# Patient Record
Sex: MALE | Race: WHITE | NOT HISPANIC OR LATINO | Employment: OTHER | URBAN - METROPOLITAN AREA
[De-identification: names, ages, dates, MRNs, and addresses within clinical notes are randomized per-mention and may not be internally consistent; named-entity substitution may affect disease eponyms.]

---

## 2022-10-10 ENCOUNTER — HOSPITAL ENCOUNTER (EMERGENCY)
Facility: HOSPITAL | Age: 42
Discharge: HOME | End: 2022-10-10
Attending: EMERGENCY MEDICINE | Admitting: EMERGENCY MEDICINE

## 2022-10-10 VITALS
WEIGHT: 148.15 LBS | HEART RATE: 89 BPM | HEIGHT: 72 IN | BODY MASS INDEX: 20.07 KG/M2 | SYSTOLIC BLOOD PRESSURE: 107 MMHG | RESPIRATION RATE: 16 BRPM | TEMPERATURE: 97.5 F | DIASTOLIC BLOOD PRESSURE: 80 MMHG | OXYGEN SATURATION: 100 %

## 2022-10-10 DIAGNOSIS — F41.9 ANXIETY: Primary | ICD-10-CM

## 2022-10-10 DIAGNOSIS — R45.89 SYMPTOMS OF DEPRESSION: ICD-10-CM

## 2022-10-10 DIAGNOSIS — F11.11 HX OF OPIOID ABUSE: ICD-10-CM

## 2022-10-10 LAB
ALBUMIN SERPL-MCNC: 4 G/DL (ref 3.5–5.2)
ALBUMIN/GLOB SERPL: 1.3 G/DL
ALP SERPL-CCNC: 65 U/L (ref 39–117)
ALT SERPL W P-5'-P-CCNC: 13 U/L (ref 1–41)
AMPHET+METHAMPHET UR QL: NEGATIVE
ANION GAP SERPL CALCULATED.3IONS-SCNC: 9 MMOL/L (ref 5–15)
AST SERPL-CCNC: 20 U/L (ref 1–40)
BARBITURATES UR QL SCN: NEGATIVE
BASOPHILS # BLD AUTO: 0.03 10*3/MM3 (ref 0–0.2)
BASOPHILS NFR BLD AUTO: 0.6 % (ref 0–1.5)
BENZODIAZ UR QL SCN: POSITIVE
BILIRUB SERPL-MCNC: <0.2 MG/DL (ref 0–1.2)
BUN SERPL-MCNC: 19 MG/DL (ref 6–20)
BUN/CREAT SERPL: 14.4 (ref 7–25)
CALCIUM SPEC-SCNC: 9.4 MG/DL (ref 8.6–10.5)
CANNABINOIDS SERPL QL: NEGATIVE
CHLORIDE SERPL-SCNC: 109 MMOL/L (ref 98–107)
CO2 SERPL-SCNC: 21 MMOL/L (ref 22–29)
COCAINE UR QL: NEGATIVE
CREAT SERPL-MCNC: 1.32 MG/DL (ref 0.76–1.27)
DEPRECATED RDW RBC AUTO: 47.9 FL (ref 37–54)
EGFRCR SERPLBLD CKD-EPI 2021: 69.5 ML/MIN/1.73
EOSINOPHIL # BLD AUTO: 0.11 10*3/MM3 (ref 0–0.4)
EOSINOPHIL NFR BLD AUTO: 2.3 % (ref 0.3–6.2)
ERYTHROCYTE [DISTWIDTH] IN BLOOD BY AUTOMATED COUNT: 15.5 % (ref 12.3–15.4)
ETHANOL BLD-MCNC: <10 MG/DL (ref 0–10)
ETHANOL UR QL: <0.01 %
GLOBULIN UR ELPH-MCNC: 3 GM/DL
GLUCOSE SERPL-MCNC: 78 MG/DL (ref 65–99)
HCT VFR BLD AUTO: 36.1 % (ref 37.5–51)
HGB BLD-MCNC: 11.7 G/DL (ref 13–17.7)
IMM GRANULOCYTES # BLD AUTO: 0 10*3/MM3 (ref 0–0.05)
IMM GRANULOCYTES NFR BLD AUTO: 0 % (ref 0–0.5)
LYMPHOCYTES # BLD AUTO: 2 10*3/MM3 (ref 0.7–3.1)
LYMPHOCYTES NFR BLD AUTO: 41.8 % (ref 19.6–45.3)
MCH RBC QN AUTO: 27.5 PG (ref 26.6–33)
MCHC RBC AUTO-ENTMCNC: 32.4 G/DL (ref 31.5–35.7)
MCV RBC AUTO: 84.7 FL (ref 79–97)
METHADONE UR QL SCN: NEGATIVE
MONOCYTES # BLD AUTO: 0.27 10*3/MM3 (ref 0.1–0.9)
MONOCYTES NFR BLD AUTO: 5.6 % (ref 5–12)
NEUTROPHILS NFR BLD AUTO: 2.37 10*3/MM3 (ref 1.7–7)
NEUTROPHILS NFR BLD AUTO: 49.7 % (ref 42.7–76)
NRBC BLD AUTO-RTO: 0 /100 WBC (ref 0–0.2)
OPIATES UR QL: NEGATIVE
OXYCODONE UR QL SCN: NEGATIVE
PLATELET # BLD AUTO: 397 10*3/MM3 (ref 140–450)
PMV BLD AUTO: 9.6 FL (ref 6–12)
POTASSIUM SERPL-SCNC: 5.4 MMOL/L (ref 3.5–5.2)
PROT SERPL-MCNC: 7 G/DL (ref 6–8.5)
RBC # BLD AUTO: 4.26 10*6/MM3 (ref 4.14–5.8)
SODIUM SERPL-SCNC: 139 MMOL/L (ref 136–145)
WBC NRBC COR # BLD: 4.78 10*3/MM3 (ref 3.4–10.8)

## 2022-10-10 PROCEDURE — 85025 COMPLETE CBC W/AUTO DIFF WBC: CPT | Performed by: NURSE PRACTITIONER

## 2022-10-10 PROCEDURE — 80307 DRUG TEST PRSMV CHEM ANLYZR: CPT | Performed by: NURSE PRACTITIONER

## 2022-10-10 PROCEDURE — 80053 COMPREHEN METABOLIC PANEL: CPT | Performed by: NURSE PRACTITIONER

## 2022-10-10 PROCEDURE — 36415 COLL VENOUS BLD VENIPUNCTURE: CPT

## 2022-10-10 PROCEDURE — 99283 EMERGENCY DEPT VISIT LOW MDM: CPT

## 2022-10-10 PROCEDURE — 90791 PSYCH DIAGNOSTIC EVALUATION: CPT | Performed by: SOCIAL WORKER

## 2022-10-10 PROCEDURE — 82077 ASSAY SPEC XCP UR&BREATH IA: CPT | Performed by: NURSE PRACTITIONER

## 2022-10-10 RX ORDER — MYCOPHENOLATE MOFETIL 500 MG/1
TABLET ORAL 2 TIMES DAILY
COMMUNITY

## 2022-10-10 NOTE — ED NOTES
Pt attempted to provide urine sample but was unsuccessful. This RN in appropriate ppe while in pt room. Pt wearing mask.

## 2022-10-10 NOTE — DISCHARGE INSTRUCTIONS
Although you are being discharged from the ED today, I encourage you to return for worsening symptoms. Things can, and do, change such that treatment at home with medication may not be adequate. Specifically I recommend returning for chest pain or discomfort, difficulty breathing, persistent vomiting or difficulty holding down liquids or medications, concerns for harming oneself or anyone else, or any other worsening or alarming symptoms.     Rest.   Utilize resources as given by our Behavioral Health team for assistance with mental health treatment and opioid abuse (medication assisted treatment).  Follow up with primary care provider for further management and to have blood pressure rechecked.  Take your medications as prescribed.  Call 911 or go to ER if concern for plan to harm yourself.

## 2022-10-10 NOTE — ED PROVIDER NOTES
The B[facility] and I have discussed this patient's history, physical exam and treatment plan.  I provided a substantive portion of the care of this patient.  I have reviewed the documentation and personally had a face to face interaction with the patient and personally performed the physical exam, in its entirety.  I affirm the documentation and agree with the treatment and plan.  The following describes my personal findings.      The patient presents complaining of feeling helpless and hopeless, negative thoughts that are pervasive,, poor sleep, patient denies hallucination, reports he is having thoughts that he does not want to be alive but denies a plan for self-harm.    Comprehensive Physical exam:  Patient is nontoxic appearing oriented, conversant awake, alert  HEENT: normocephalic, atraumatic  Neck: No JVD no goiter, no pain with ROM  Pulmonary: Nontachypneic, breath sounds heard well bilaterally  cardiovascular: Nontachycardic  Abdomen: Nondistended  musculoskeletal: Good range of motion, pulse, sensation x4  Neuro/psychiatric:calm, appropriate, cooperative, reports negative thoughts, denies plan for self-harm or harm to others  Skin:warm, dry    Access to evaluate and give recommendations.  Patient reports she does not have a therapist or psychiatrist that he is actively seeing.  Reports he seen multiple providers and taken meds without improvement of his symptoms    Patient was wearing facemask when I entered the room and throughout our encounter. Full protective equipment was worn throughout this patient encounter including a face mask, eye protection and gloves. Hand hygiene was performed before donning protective equipment and after removal when leaving the room.           Selma Arreaga MD  10/10/22 2357

## 2022-10-10 NOTE — ED TRIAGE NOTES
"Pt has had suicidal ideation - \"24/7\" - no other details - he wants to speak privately    Patient was placed in face mask during first look triage.  Patient was wearing a face mask throughout encounter.  I wore personal protective equipment throughout the encounter.  Hand hygiene was performed before and after patient encounter.     "

## 2022-10-10 NOTE — CONSULTS
"Access center evaluated 41-year-old male for suicidal ideation.  Patient told ED staff that he was having suicidal thoughts but did not have a plan.  Patient stated \"I do not feel I will try to kill myself\".  Patient told Access that he is withdrawing from heroin and was hoping to be able to get some benzos to be able to help.  Patient is going through treatment at the [facility] and they told him to come to [facility] to get medicine. They will not be able to start him on his Suboxone until Friday.  Patient states he has Suboxone at home that he was trying to use because it has worked for him in the past but does not feel it is helping.  Patient states that he has tried all kinds of medicines including antidepressants and antianxiety such as Atarax and Vistaril but \"nothing works\".  Patient states the only thing that worked in the past was Valium and Xanax.  Patient states he is over the worst part of his withdrawal but continues very uncomfortable.  Patient states he used Xanax off the street to help with the worst part of this more current withdrawal.  Patient states he had been clean for a period of time with Suboxone but then fell off the wagon a couple of weeks ago.  Patient had told ED staff that he had taken a Tylenol overdose in the past.  Access asked patient if that was a suicide attempt and he stated it was a \"manipulation to stay in the house\".    Patient states he has been to numerous substance abuse treatment programs and detox and leaves treatment AMA.  Patient states he just tried to go to [facility]'s 5-day detox program and he left after a day.  Patient has something negative to say about any program offered including any of the support groups.  Patient came to the ED he states to hope to get on a benzo.  Patient rates his current depression as a \"8 or 9/10\" and his anxiety as a \"10/10\".  Patient states he has had anxiety his whole life.  Patient lives with his father and states his " "mother  1 year ago.  Patient states he knows he is a burden to his father and that his father is trying to get him to move out.  Access talked to patient about the need for him to stick with treatment and not leave prematurely AMA.  Patient denies any inpatient psychiatric treatment and states he had some counseling in the past.  Patient states he last used heroin 10 days ago.  Patient states his withdrawal symptoms include poor sleep, poor concentration, increased anxiety, some diarrhea and cold chills.  Patient states he realizes he needs to push hard to get through the last part of his withdrawal.  Patient turned down the possibility of being sent home with some Atarax.  Patient stated he will likely try to find some street Xanax again.    Patient lives in a house with his father but states his father will be selling the house sometime soon.  At that point patient states he will need to find a place to live.  Patient states he is on disability but has not received a check in 11 months because they overpaid him in back money and he \"blew through it\".  Patient states he will receive a disability check at the end of November.  Access gave patient information on several programs that do medication assisted detox that he has not tried in the past.  Patient states he will try to call them but was not hopeful.  Access also gave him information on [facility] as he does not like the [facility] aspect of AA.  Access talked with ED PA who was in agreement with discharge plan.  Patient okay with discharge plan.  "

## 2022-10-10 NOTE — ED PROVIDER NOTES
EMERGENCY DEPARTMENT ENCOUNTER    Room Number:  04/04  Date seen:  10/10/2022  Time seen: 12:17 EDT  PCP: Provider, No Known  Historian: patient      HPI:  Chief Complaint: suicidal ideation    Context: Ryan Marina is a 41 y.o. male who presents to the ED for evaluation of suicidal ideation.  Patient states he has felt this way for several weeks.  Patient states he has had recurring thoughts of suicidal ideations but no current plan.  Patient states he has felt very depressed.  He believes this is due to the loss of his mother that occurred approximately 1 year ago.  He has lived in his parents home and his father will be moving soon so he will have nowhere to live, this is also contributing to his depression.  He states he feels on accomplished for his age.  Patient states he has never been diagnosed with depression, but has dealt with anxiety his entire life.  He admits to suicidal ideation in the past and suicide attempts with Tylenol overdose and illicit drug overdose.  Patient admits to history of opioid addiction.  He states he last used heroin approximately 10 days ago.  He denies any alcohol use.  Patient states he has never been treated at an inpatient psychiatric facility but has been in rehab before in the past.  He denies any homicidal ideation.  He does admit to hearing and seeing voices at nighttime before he sleeps.  He states he has dealt with severe sadness and lack of motivation.  He admits to a history of kidney disease, glomerulonephritis and sees a nephrologist for this- Dr. Cam.  He denies any current or past history of dialysis.      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   • KNEE ACL RECONSTRUCTION      1996 and 1999--anterior cruciate ligament reconstruction right knee.         FAMILY HISTORY  Family History   Problem Relation Age of Onset   • Diabetes Mother         Type 2   • Hyperthyroidism Father    • Diabetes Maternal Grandmother         Type 2         SOCIAL HISTORY  Social History     Socioeconomic History   • Marital status: Single   Tobacco Use   • Smoking status: Every Day     Packs/day: 1.50     Types: Cigarettes   Substance and Sexual Activity   • Alcohol use: No         ALLERGIES  Patient has no known allergies.        REVIEW OF SYSTEMS  Review of Systems   Constitutional: Negative for chills and fever.   Respiratory: Negative for cough and shortness of breath.    Cardiovascular: Negative for chest pain.   Gastrointestinal: Negative for abdominal pain, nausea and vomiting.   Genitourinary: Negative for dysuria and flank pain.   Musculoskeletal: Negative for back pain.   Skin: Negative for color change.   Neurological: Negative for dizziness, seizures, syncope and headaches.   Psychiatric/Behavioral: Positive for hallucinations and suicidal ideas. Negative for self-injury. The patient is nervous/anxious.       All systems reviewed and negative except for those discussed in HPI.       PHYSICAL EXAM  ED Triage Vitals   Temp Heart Rate Resp BP SpO2   10/10/22 1108 10/10/22 1108 10/10/22 1108 10/10/22 1124 10/10/22 1108   97.5 °F (36.4 °C) 89 16  107/80 100 %      Temp src Heart Rate Source Patient Position BP Location FiO2 (%)   10/10/22 1108 10/10/22 1108 -- -- --   Tympanic Monitor            GENERAL: not distressed  HENT: atraumatic  EYES: no scleral icterus  CV: regular rhythm, regular rate.  No murmurs, rubs, or gallops  RESPIRATORY: normal effort.  Clear to auscultation bilaterally  ABDOMEN: soft, nontender  MUSCULOSKELETAL: no deformity  NEURO: alert, moves all extremities, follows commands  PSYCH: Suicidal thoughts but no current plan.  No homicidal ideations.  Affect is somewhat flat, mood seems depressed.  SKIN: warm, dry.  Old track marks present on bilateral forearms    Vital signs and nursing notes reviewed.          LAB RESULTS  Recent Results (from the past 24 hour(s))   Comprehensive Metabolic Panel    Collection Time: 10/10/22 11:39 AM    Specimen: Blood   Result Value Ref Range    Glucose 78 65 - 99 mg/dL    BUN 19 6 - 20 mg/dL    Creatinine 1.32 (H) 0.76 - 1.27 mg/dL    Sodium 139 136 - 145 mmol/L    Potassium 5.4 (H) 3.5 - 5.2 mmol/L    Chloride 109 (H) 98 - 107 mmol/L    CO2 21.0 (L) 22.0 - 29.0 mmol/L    Calcium 9.4 8.6 - 10.5 mg/dL    Total Protein 7.0 6.0 - 8.5 g/dL    Albumin 4.00 3.50 - 5.20 g/dL    ALT (SGPT) 13 1 - 41 U/L    AST (SGOT) 20 1 - 40 U/L    Alkaline Phosphatase 65 39 - 117 U/L    Total Bilirubin <0.2 0.0 - 1.2 mg/dL    Globulin 3.0 gm/dL    A/G Ratio 1.3 g/dL    BUN/Creatinine Ratio 14.4 7.0 - 25.0    Anion Gap 9.0 5.0 - 15.0 mmol/L    eGFR 69.5 >60.0 mL/min/1.73   Ethanol    Collection Time: 10/10/22 11:39 AM    Specimen: Blood   Result Value Ref Range    Ethanol <10 0 - 10 mg/dL    Ethanol % <0.010 %   CBC Auto Differential    Collection Time: 10/10/22 11:39 AM    Specimen: Blood   Result Value Ref Range    WBC 4.78 3.40 - 10.80 10*3/mm3    RBC 4.26 4.14 - 5.80 10*6/mm3    Hemoglobin 11.7 (L) 13.0 - 17.7 g/dL    Hematocrit 36.1 (L) 37.5 - 51.0 %    MCV 84.7 79.0 - 97.0 fL    MCH 27.5 26.6 - 33.0 pg    MCHC 32.4 31.5  - 35.7 g/dL    RDW 15.5 (H) 12.3 - 15.4 %    RDW-SD 47.9 37.0 - 54.0 fl    MPV 9.6 6.0 - 12.0 fL    Platelets 397 140 - 450 10*3/mm3    Neutrophil % 49.7 42.7 - 76.0 %    Lymphocyte % 41.8 19.6 - 45.3 %    Monocyte % 5.6 5.0 - 12.0 %    Eosinophil % 2.3 0.3 - 6.2 %    Basophil % 0.6 0.0 - 1.5 %    Immature Grans % 0.0 0.0 - 0.5 %    Neutrophils, Absolute 2.37 1.70 - 7.00 10*3/mm3    Lymphocytes, Absolute 2.00 0.70 - 3.10 10*3/mm3    Monocytes, Absolute 0.27 0.10 - 0.90 10*3/mm3    Eosinophils, Absolute 0.11 0.00 - 0.40 10*3/mm3    Basophils, Absolute 0.03 0.00 - 0.20 10*3/mm3    Immature Grans, Absolute 0.00 0.00 - 0.05 10*3/mm3    nRBC 0.0 0.0 - 0.2 /100 WBC   Urine Drug Screen - Urine, Clean Catch    Collection Time: 10/10/22 12:36 PM    Specimen: Urine, Clean Catch   Result Value Ref Range    Amphet/Methamphet, Screen Negative Negative    Barbiturates Screen, Urine Negative Negative    Benzodiazepine Screen, Urine Positive (A) Negative    Cocaine Screen, Urine Negative Negative    Opiate Screen Negative Negative    THC, Screen, Urine Negative Negative    Methadone Screen, Urine Negative Negative    Oxycodone Screen, Urine Negative Negative       Ordered the above labs and independently reviewed the results.        MEDICATIONS GIVEN IN ER  Medications - No data to display    MEDICAL RECORD REVIEW  I reviewed the patient's records      PROGRESS, DATA ANALYSIS, CONSULTS, AND MEDICAL DECISION MAKING    All labs have been independently reviewed by me. Discussion below represents my analysis of pertinent findings related to patient's condition, differential diagnosis, treatment plan and final disposition.    DDX includes but not limited to depression, anxiety, psychosis, acute alcohol intoxication, acute drug intoxication.      ED Course as of 10/10/22 1524   Mon Oct 10, 2022   1122 First look.  Pt having non stop thoughts of suicide.  These thoughts are intrusive and obsessive. He has multiple medical problems. I have  discussed plan for labs, ACCESS consult.   [EW]   1316 Patient's labs have returned.  He did test positive for benzodiazepines.  His creatinine is 1.32 which is good considering he told me his creatinine has been in the threes before with his history of kidney issues.  He is awaiting access eval at this time. [AH]   1357 Lee Givens RN, is now here to evaluate. [AH]   1431 Discussed the patient with lee Givens RN.  She states she had a long discussion with the patient and he admitted to her he does not have any active plan to harm himself.  She believes he is seeking Ativan and admitted that he has manipulated with prior suicide attempt in the past to try to get benzos.  She offered him multiple resources, to which she denied and Stating he just wanted Ativan.  She will be writing him resources for medication assisted sobriety but believes he will be good for discharge.  I will discontinue to hold at this time. [AH]   1508 I discussed lab results with the patient and my discussion with lee Givens RN.  He states he has tried all of the things that she offered in the past and believes he just needs help with withdrawal at this time.  His vital signs and lab work and physical presentation do not show signs of acute withdrawal at this time.  Chon believes he is seeking Ativan as she states he directly told her that that is all he really came here for.  He has been given resources for medication assisted opiate abuse treatment and will be discharged from our ER. [AH]      ED Course User Index  [AH] Farida Hernadez PA  [EW] Lillian Soliman APRN           Reviewed pt's history and workup with Dr. Arreaga.  After a bedside evaluation; they agree with the plan of care      Patient's disposition is discharge.    Patient was placed in face mask in first look. Patient was wearing facemask each time I entered the room and throughout our encounter. I wore full protective equipment throughout this patient encounter including a  face mask, eye shield, gown and gloves. Hand hygiene was performed before donning protective equipment and after removal when leaving the room.        DIAGNOSIS  Final diagnoses:   Anxiety   Symptoms of depression   Hx of opioid abuse (HCC)           Latest Documented Vital Signs:  As of 15:24 EDT  BP- 107/80 HR- 89 Temp- 97.5 °F (36.4 °C) (Tympanic) O2 sat- 100%         Farida Hernadez PA  10/10/22 4045

## 2023-10-19 ENCOUNTER — HOSPITAL ENCOUNTER (EMERGENCY)
Facility: HOSPITAL | Age: 43
Discharge: HOME | End: 2023-10-19
Attending: EMERGENCY MEDICINE
Payer: MEDICARE

## 2023-10-19 ENCOUNTER — APPOINTMENT (OUTPATIENT)
Dept: CARDIOLOGY | Facility: HOSPITAL | Age: 43
End: 2023-10-19
Payer: MEDICARE

## 2023-10-19 ENCOUNTER — APPOINTMENT (OUTPATIENT)
Dept: GENERAL RADIOLOGY | Facility: HOSPITAL | Age: 43
End: 2023-10-19
Payer: MEDICARE

## 2023-10-19 VITALS
HEART RATE: 80 BPM | BODY MASS INDEX: 1.76 KG/M2 | RESPIRATION RATE: 18 BRPM | DIASTOLIC BLOOD PRESSURE: 72 MMHG | SYSTOLIC BLOOD PRESSURE: 102 MMHG | TEMPERATURE: 97.7 F | WEIGHT: 13 LBS | HEIGHT: 72 IN | OXYGEN SATURATION: 97 %

## 2023-10-19 DIAGNOSIS — I82.4Z1 ACUTE DEEP VEIN THROMBOSIS (DVT) OF DISTAL VEIN OF RIGHT LOWER EXTREMITY: Primary | ICD-10-CM

## 2023-10-19 LAB
ALBUMIN SERPL-MCNC: 3.7 G/DL (ref 3.5–5.2)
ALBUMIN/GLOB SERPL: 1.2 G/DL
ALP SERPL-CCNC: 118 U/L (ref 39–117)
ALT SERPL W P-5'-P-CCNC: 17 U/L (ref 1–41)
ANION GAP SERPL CALCULATED.3IONS-SCNC: 12.2 MMOL/L (ref 5–15)
APTT PPP: 31.4 SECONDS (ref 22.7–35.4)
AST SERPL-CCNC: 39 U/L (ref 1–40)
BASOPHILS # BLD AUTO: 0.01 10*3/MM3 (ref 0–0.2)
BASOPHILS NFR BLD AUTO: 0.2 % (ref 0–1.5)
BH CV LOW VAS RIGHT GASTRONEMIUS VESSEL: 1
BH CV LOWER VASCULAR LEFT COMMON FEMORAL AUGMENT: NORMAL
BH CV LOWER VASCULAR LEFT COMMON FEMORAL COMPETENT: NORMAL
BH CV LOWER VASCULAR LEFT COMMON FEMORAL COMPRESS: NORMAL
BH CV LOWER VASCULAR LEFT COMMON FEMORAL PHASIC: NORMAL
BH CV LOWER VASCULAR LEFT COMMON FEMORAL SPONT: NORMAL
BH CV LOWER VASCULAR RIGHT COMMON FEMORAL AUGMENT: NORMAL
BH CV LOWER VASCULAR RIGHT COMMON FEMORAL COMPETENT: NORMAL
BH CV LOWER VASCULAR RIGHT COMMON FEMORAL COMPRESS: NORMAL
BH CV LOWER VASCULAR RIGHT COMMON FEMORAL PHASIC: NORMAL
BH CV LOWER VASCULAR RIGHT COMMON FEMORAL SPONT: NORMAL
BH CV LOWER VASCULAR RIGHT DISTAL FEMORAL COMPRESS: NORMAL
BH CV LOWER VASCULAR RIGHT GASTRONEMIUS COMPRESS: NORMAL
BH CV LOWER VASCULAR RIGHT GASTRONEMIUS THROMBUS: NORMAL
BH CV LOWER VASCULAR RIGHT GREATER SAPH AK COMPRESS: NORMAL
BH CV LOWER VASCULAR RIGHT GREATER SAPH BK COMPRESS: NORMAL
BH CV LOWER VASCULAR RIGHT LESSER SAPH COMPRESS: NORMAL
BH CV LOWER VASCULAR RIGHT MID FEMORAL AUGMENT: NORMAL
BH CV LOWER VASCULAR RIGHT MID FEMORAL COMPETENT: NORMAL
BH CV LOWER VASCULAR RIGHT MID FEMORAL COMPRESS: NORMAL
BH CV LOWER VASCULAR RIGHT MID FEMORAL PHASIC: NORMAL
BH CV LOWER VASCULAR RIGHT MID FEMORAL SPONT: NORMAL
BH CV LOWER VASCULAR RIGHT PERONEAL COMPRESS: NORMAL
BH CV LOWER VASCULAR RIGHT POPLITEAL AUGMENT: NORMAL
BH CV LOWER VASCULAR RIGHT POPLITEAL COMPETENT: NORMAL
BH CV LOWER VASCULAR RIGHT POPLITEAL COMPRESS: NORMAL
BH CV LOWER VASCULAR RIGHT POPLITEAL PHASIC: NORMAL
BH CV LOWER VASCULAR RIGHT POPLITEAL SPONT: NORMAL
BH CV LOWER VASCULAR RIGHT POSTERIOR TIBIAL COMPRESS: NORMAL
BH CV LOWER VASCULAR RIGHT PROFUNDA FEMORAL COMPRESS: NORMAL
BH CV LOWER VASCULAR RIGHT PROXIMAL FEMORAL COMPRESS: NORMAL
BH CV LOWER VASCULAR RIGHT SAPHENOFEMORAL JUNCTION COMPRESS: NORMAL
BH CV VAS PRELIMINARY FINDINGS SCRIPTING: 1
BILIRUB SERPL-MCNC: <0.2 MG/DL (ref 0–1.2)
BUN SERPL-MCNC: 19 MG/DL (ref 6–20)
BUN/CREAT SERPL: 14.5 (ref 7–25)
CALCIUM SPEC-SCNC: 9.1 MG/DL (ref 8.6–10.5)
CHLORIDE SERPL-SCNC: 106 MMOL/L (ref 98–107)
CO2 SERPL-SCNC: 21.8 MMOL/L (ref 22–29)
CREAT SERPL-MCNC: 1.31 MG/DL (ref 0.76–1.27)
DEPRECATED RDW RBC AUTO: 42.1 FL (ref 37–54)
EGFRCR SERPLBLD CKD-EPI 2021: 69.7 ML/MIN/1.73
EOSINOPHIL # BLD AUTO: 0.1 10*3/MM3 (ref 0–0.4)
EOSINOPHIL NFR BLD AUTO: 2.2 % (ref 0.3–6.2)
ERYTHROCYTE [DISTWIDTH] IN BLOOD BY AUTOMATED COUNT: 13.4 % (ref 12.3–15.4)
GLOBULIN UR ELPH-MCNC: 3 GM/DL
GLUCOSE SERPL-MCNC: 76 MG/DL (ref 65–99)
HCT VFR BLD AUTO: 28.5 % (ref 37.5–51)
HGB BLD-MCNC: 9.5 G/DL (ref 13–17.7)
IMM GRANULOCYTES # BLD AUTO: 0.01 10*3/MM3 (ref 0–0.05)
IMM GRANULOCYTES NFR BLD AUTO: 0.2 % (ref 0–0.5)
INR PPP: 0.95 (ref 0.9–1.1)
LYMPHOCYTES # BLD AUTO: 1.74 10*3/MM3 (ref 0.7–3.1)
LYMPHOCYTES NFR BLD AUTO: 38.3 % (ref 19.6–45.3)
MCH RBC QN AUTO: 28.8 PG (ref 26.6–33)
MCHC RBC AUTO-ENTMCNC: 33.3 G/DL (ref 31.5–35.7)
MCV RBC AUTO: 86.4 FL (ref 79–97)
MONOCYTES # BLD AUTO: 0.32 10*3/MM3 (ref 0.1–0.9)
MONOCYTES NFR BLD AUTO: 7 % (ref 5–12)
NEUTROPHILS NFR BLD AUTO: 2.36 10*3/MM3 (ref 1.7–7)
NEUTROPHILS NFR BLD AUTO: 52.1 % (ref 42.7–76)
NRBC BLD AUTO-RTO: 0 /100 WBC (ref 0–0.2)
PLATELET # BLD AUTO: 225 10*3/MM3 (ref 140–450)
PMV BLD AUTO: 9.5 FL (ref 6–12)
POTASSIUM SERPL-SCNC: 3.9 MMOL/L (ref 3.5–5.2)
PROT SERPL-MCNC: 6.7 G/DL (ref 6–8.5)
PROTHROMBIN TIME: 12.7 SECONDS (ref 11.7–14.2)
RBC # BLD AUTO: 3.3 10*6/MM3 (ref 4.14–5.8)
SODIUM SERPL-SCNC: 140 MMOL/L (ref 136–145)
WBC NRBC COR # BLD: 4.54 10*3/MM3 (ref 3.4–10.8)

## 2023-10-19 PROCEDURE — 73610 X-RAY EXAM OF ANKLE: CPT

## 2023-10-19 PROCEDURE — 63710000001 ONDANSETRON ODT 4 MG TABLET DISPERSIBLE: Performed by: EMERGENCY MEDICINE

## 2023-10-19 PROCEDURE — 99284 EMERGENCY DEPT VISIT MOD MDM: CPT

## 2023-10-19 PROCEDURE — 93971 EXTREMITY STUDY: CPT

## 2023-10-19 PROCEDURE — 85610 PROTHROMBIN TIME: CPT | Performed by: EMERGENCY MEDICINE

## 2023-10-19 PROCEDURE — 85730 THROMBOPLASTIN TIME PARTIAL: CPT | Performed by: EMERGENCY MEDICINE

## 2023-10-19 PROCEDURE — 80053 COMPREHEN METABOLIC PANEL: CPT | Performed by: EMERGENCY MEDICINE

## 2023-10-19 PROCEDURE — 85025 COMPLETE CBC W/AUTO DIFF WBC: CPT | Performed by: EMERGENCY MEDICINE

## 2023-10-19 RX ORDER — IBUPROFEN 800 MG/1
800 TABLET ORAL ONCE
Status: DISCONTINUED | OUTPATIENT
Start: 2023-10-19 | End: 2023-10-19 | Stop reason: HOSPADM

## 2023-10-19 RX ORDER — BUMETANIDE 2 MG/1
2 TABLET ORAL
COMMUNITY
Start: 2023-05-08

## 2023-10-19 RX ORDER — OXYCODONE HYDROCHLORIDE AND ACETAMINOPHEN 5; 325 MG/1; MG/1
1 TABLET ORAL ONCE AS NEEDED
Status: DISCONTINUED | OUTPATIENT
Start: 2023-10-19 | End: 2023-10-19 | Stop reason: HOSPADM

## 2023-10-19 RX ORDER — MEDICAL SUPPLY, MISCELLANEOUS
EACH MISCELLANEOUS
COMMUNITY
Start: 2023-09-26

## 2023-10-19 RX ORDER — ONDANSETRON 4 MG/1
8 TABLET, ORALLY DISINTEGRATING ORAL ONCE
Status: COMPLETED | OUTPATIENT
Start: 2023-10-19 | End: 2023-10-19

## 2023-10-19 RX ORDER — FOLIC ACID 1 MG/1
1 TABLET ORAL DAILY
COMMUNITY
Start: 2023-08-10 | End: 2023-11-08

## 2023-10-19 RX ORDER — METHADONE HYDROCHLORIDE 5 MG/1
115 TABLET ORAL
COMMUNITY

## 2023-10-19 RX ORDER — OXYCODONE HYDROCHLORIDE AND ACETAMINOPHEN 5; 325 MG/1; MG/1
1 TABLET ORAL ONCE
Status: COMPLETED | OUTPATIENT
Start: 2023-10-19 | End: 2023-10-19

## 2023-10-19 RX ORDER — POLYETHYLENE GLYCOL 3350 17 G/17G
POWDER ORAL
COMMUNITY
Start: 2023-09-26

## 2023-10-19 RX ADMIN — OXYCODONE HYDROCHLORIDE AND ACETAMINOPHEN 1 TABLET: 5; 325 TABLET ORAL at 17:36

## 2023-10-19 RX ADMIN — ONDANSETRON 8 MG: 4 TABLET, ORALLY DISINTEGRATING ORAL at 19:20

## 2023-10-19 NOTE — ED PROVIDER NOTES
EMERGENCY DEPARTMENT ENCOUNTER  Room Number:  39/39  Date of encounter:  10/19/2023  PCP: Provider, No Known  Patient Care Team:  Provider, No Known as PCP - General     HPI:  Context: Ryan Marina is a 42 y.o. male who presents to the ED c/o chief complaint of right ankle pain and loosened screw.  Patient reports that he had surgery for right [facility]  on the 23rd of last month.  Patient reports that he was seen at his orthopedist office 2 days ago secondary to increased pain and swelling.  Patient reports that he had x-ray imaging that reportedly showed lucent screw.  Patient reports that he was unable to speak with his orthopedist at that time, has been unable to follow-up with him.  Patient reports that he has had increased pain and swelling for the last 3 days.  No new injury.  Patient reports chronic wound from time of surgery, wound is slowly improving, patient denies any recent change other than sutures were removed recently.  No increased redness warmth or drainage, no fever shakes chills or night sweats.  Patient denies any chest pain or shortness of breath.    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    FRACTURE SURGERY      KNEE ACL RECONSTRUCTION      1996 and 1999--anterior cruciate ligament reconstruction right knee.       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    The patient's allergies have been reviewed    REVIEW OF SYSTEMS  All systems reviewed and negative except for those discussed in HPI.     PHYSICAL EXAM  I have reviewed the triage vital signs and nursing notes.  ED Triage Vitals   Temp Heart Rate Resp BP SpO2   10/19/23 1655 10/19/23 1657 10/19/23 1655 -- 10/19/23 1657   97.7 °F (36.5 °C) 111 18  100 %      Temp src Heart Rate Source Patient Position BP Location FiO2 (%)   -- 10/19/23 1657 -- -- --    Monitor          General: No acute distress.  HENT: NCAT, PERRL, Nares patent.  Eyes: no scleral icterus.  Neck: trachea midline, no ROM limitations.  CV: regular rhythm, regular rate.  Respiratory: normal effort, CTAB.  Abdomen: soft, nondistended, NTTP, no rebound tenderness, no guarding or rigidity.  Musculoskeletal:   Right lower extremity: Patient has tenderness and swelling at the distal right lower leg, nonpitting edema, scabbed lesion on right lateral ankle, trace surrounding pinkish discoloration, no warmth, no induration, no fluctuance or drainage.  Skin is warm dry, 2+ dorsalis pedis and posterior tibial pulse, sensation intact to  light touch.  Toes/ankle up/down.  No palpable cords, negative Homans.  Neuro: alert, moves all extremities, follows commands.  Skin: warm, dry.    LAB RESULTS  Recent Results (from the past 24 hour(s))   Comprehensive Metabolic Panel    Collection Time: 10/19/23  5:22 PM    Specimen: Blood   Result Value Ref Range    Glucose 76 65 - 99 mg/dL    BUN 19 6 - 20 mg/dL    Creatinine 1.31 (H) 0.76 - 1.27 mg/dL    Sodium 140 136 - 145 mmol/L    Potassium 3.9 3.5 - 5.2 mmol/L    Chloride 106 98 - 107 mmol/L    CO2 21.8 (L) 22.0 - 29.0 mmol/L    Calcium 9.1 8.6 - 10.5 mg/dL    Total Protein 6.7 6.0 - 8.5 g/dL    Albumin 3.7 3.5 - 5.2 g/dL    ALT (SGPT) 17 1 - 41 U/L    AST (SGOT) 39 1 - 40 U/L    Alkaline Phosphatase 118 (H) 39 - 117 U/L    Total Bilirubin <0.2 0.0 - 1.2 mg/dL    Globulin 3.0 gm/dL    A/G Ratio 1.2 g/dL    BUN/Creatinine Ratio 14.5 7.0 - 25.0    Anion Gap 12.2 5.0 - 15.0 mmol/L    eGFR 69.7 >60.0 mL/min/1.73   Protime-INR    Collection Time: 10/19/23  5:22 PM    Specimen: Blood   Result Value Ref Range    Protime 12.7 11.7 - 14.2 Seconds    INR 0.95 0.90 - 1.10   aPTT    Collection Time: 10/19/23  5:22 PM    Specimen: Blood   Result Value Ref Range    PTT 31.4 22.7 - 35.4 seconds   CBC Auto Differential    Collection Time: 10/19/23  5:22 PM    Specimen: Blood   Result Value Ref Range    WBC 4.54 3.40 - 10.80 10*3/mm3    RBC 3.30 (L) 4.14 - 5.80 10*6/mm3    Hemoglobin 9.5 (L) 13.0 - 17.7 g/dL    Hematocrit 28.5 (L) 37.5 - 51.0 %    MCV 86.4 79.0 - 97.0 fL    MCH 28.8 26.6 - 33.0 pg    MCHC 33.3 31.5 - 35.7 g/dL    RDW 13.4 12.3 - 15.4 %    RDW-SD 42.1 37.0 - 54.0 fl    MPV 9.5 6.0 - 12.0 fL    Platelets 225 140 - 450 10*3/mm3    Neutrophil % 52.1 42.7 - 76.0 %    Lymphocyte % 38.3 19.6 - 45.3 %    Monocyte % 7.0 5.0 - 12.0 %    Eosinophil % 2.2 0.3 - 6.2 %    Basophil % 0.2 0.0 - 1.5 %    Immature Grans % 0.2 0.0 - 0.5 %    Neutrophils, Absolute 2.36 1.70 - 7.00 10*3/mm3    Lymphocytes, Absolute 1.74 0.70 -  3.10 10*3/mm3    Monocytes, Absolute 0.32 0.10 - 0.90 10*3/mm3    Eosinophils, Absolute 0.10 0.00 - 0.40 10*3/mm3    Basophils, Absolute 0.01 0.00 - 0.20 10*3/mm3    Immature Grans, Absolute 0.01 0.00 - 0.05 10*3/mm3    nRBC 0.0 0.0 - 0.2 /100 WBC   Duplex Venous Lower Extremity - Right CAR    Collection Time: 10/19/23  6:27 PM   Result Value Ref Range    Right Gastronemius Vessel 1.0     Right Common Femoral Spont Y     Right Common Femoral Competent Y     Right Common Femoral Phasic Y     Right Common Femoral Compress C     Right Common Femoral Augment Y     Right Saphenofemoral Junction Compress C     Right Profunda Femoral Compress C     Right Proximal Femoral Compress C     Right Mid Femoral Spont Y     Right Mid Femoral Competent Y     Right Mid Femoral Phasic Y     Right Mid Femoral Compress C     Right Mid Femoral Augment Y     Right Distal Femoral Compress C     Right Popliteal Spont Y     Right Popliteal Competent Y     Right Popliteal Phasic Y     Right Popliteal Compress C     Right Popliteal Augment Y     Right Posterior Tibial Compress C     Right Peroneal Compress C     Right Gastronemius Compress P     Right Gastronemius Thrombus A     Right Greater Saph AK Compress C     Right Greater Saph BK Compress C     Right Lesser Saph Compress C     Left Common Femoral Spont Y     Left Common Femoral Competent Y     Left Common Femoral Phasic Y     Left Common Femoral Compress C     Left Common Femoral Augment Y     BH CV VAS PRELIMINARY FINDINGS SCRIPTING 1.0        I ordered the above labs and reviewed the results.    RADIOLOGY  Duplex Venous Lower Extremity - Right CAR    Result Date: 10/19/2023    Acute right lower extremity deep vein thrombosis noted in the gastrocnemius.   All other right sided veins appeared normal.     XR Ankle 3+ View Right    Result Date: 10/19/2023  XR ANKLE 3+ VW RIGHT-  INDICATIONS: Surgery, pain, loosened hardware   TECHNIQUE: 3 VIEWS OF THE RIGHT ANKLE  COMPARISON: None  available  FINDINGS:  Surgical fixative hardware is seen at the distal tibia and fibula. Fracture lucencies are still visible. Follow-up/further evaluation can be obtained as indicated.       As described.    This report was finalized on 10/19/2023 6:00 PM by Dr. Isaak De Jesus M.D on Workstation: [id number]       I ordered the above noted radiological studies. I reviewed the images and results. I agree with the radiologist interpretation.    PROCEDURES  Procedures    MEDICATIONS GIVEN IN ER  Medications   oxyCODONE-acetaminophen (PERCOCET) 5-325 MG per tablet 1 tablet (has no administration in time range)   ibuprofen (ADVIL,MOTRIN) tablet 800 mg (0 mg Oral Hold 10/19/23 1737)   ondansetron ODT (ZOFRAN-ODT) disintegrating tablet 8 mg (has no administration in time range)   oxyCODONE-acetaminophen (PERCOCET) 5-325 MG per tablet 1 tablet (1 tablet Oral Given 10/19/23 1736)       PROGRESS, DATA ANALYSIS, CONSULTS, AND MEDICAL DECISION MAKING  A complete history and physical exam have been performed.  All available laboratory and imaging results have been reviewed by myself prior to disposition.    MDM    After the initial H&P, I discussed pertinent information from history and physical exam with patient/family.  Discussed differential diagnosis.  Discussed plan for ED evaluation/workup/treatment.  All questions answered.  Patient/family is agreeable with plan.  ED Course as of 10/19/23 1910   Thu Oct 19, 2023   1718 Obtaining x-ray imaging, duplex ultrasound to rule out blood clot, obtaining screening lab work, treating pain [JG]   1810 I reviewed right ankle x-rays in PACS, distal tibia and fibula fractures with hardware in place, no obvious misalignment. [JG]   1830 Preliminary report per ultrasound is patient is positive for acute DVT and right lower extremity, DVT limited to gastroc pain. [JG]   1908 Patient reassessed, discussed ED work-up and results, discussed finding of acute DVT, plan to start on  anticoagulation.  Patient reports that he believes that he is currently on anticoagulation, denies any history of blood clot in the past, reports he was put on it as a prophylactic after surgery.  I discussed with pharmacy, we reviewed records, do not see where patient was placed on a blood thinner including where reviewing medication list from recent [facility] discharge.  Plan to start patient on Eliquis, pharmacy given bleeding precautions. [JG]      ED Course User Index  [JG] Joe Patricia MD       AS OF 19:10 EDT VITALS:    BP - 107/75  HR - 78  TEMP - 97.7 °F (36.5 °C)  O2 SATS - 99%    DIAGNOSIS  Final diagnoses:   Acute deep vein thrombosis (DVT) of distal vein of right lower extremity         DISPOSITION  DISCHARGE    Patient discharged in stable condition.    Reviewed implications of results, diagnosis, meds, responsibility to follow up, warning signs and symptoms of possible worsening, potential complications and reasons to return to ER.    Patient/Family voiced understanding of above instructions.    Discussed plan for discharge, as there is no emergent indication for admission. Patient referred to primary care provider for BP management due to today's BP. Pt/family is agreeable and understands need for follow up and repeat testing.  Pt is aware that discharge does not mean that nothing is wrong but it indicates no emergency is present that requires admission and they must continue care with follow-up as given below or physician of their choice.     FOLLOW-UP  Your PCP    Schedule an appointment as soon as possible for a visit in 2 days  even if well, and for further management of your DVT    PATIENT CONNECTION - [facility] [id number] [phone]    if you are unable to follow up with your PCP    your orthopedist    Schedule an appointment as soon as possible for a visit in 2 days           Medication List        New Prescriptions      Apixaban Starter Pack tablet therapy pack  Take  two 5 mg tablets by mouth every 12 hours for 7 days. Followed by one 5 mg tablet every 12 hours. (Dispense starter pack if available)               Where to Get Your Medications        These medications were sent to [place] [address]      Hours: Monday to Friday 7 AM to 6 PM, Saturday & Sunday 8 AM to 4:30 PM (Closed 12 PM to 12:30 PM) Phone: [phone]   Apixaban Starter Pack tablet therapy pack            Joe Patricia MD  10/19/23 2744

## 2023-10-19 NOTE — PROGRESS NOTES
[facility] Clinical Pharmacy Services: Pharmacy Education - Direct Oral Anticoagulant - Apixaban (Eliquis)    Ryan Marina has been ordered Apixaban for DVT of the leg.     Counseling points included the following:  Apixaban's indication, patient's need for the medication, and dosing/frequency of this medication.  Enforced the importance of taking their medication as instructed every day and the reason why the medication is dosed that way.  Explained possible side effects of anticoagulation therapy, including increased risk of bleeding, and s/sx of bleeding. Also talked about ways to control bleeding for minor cuts and scrapes.  Emphasized the importance of going to the emergency room if any of the following occur: Falling and hitting your head; noticing bright red blood in urine or dark/tarry stools; vomiting up blood or vomit has a coffee-ground like texture; coughing up blood.  Discussed the importance of informing any physician or dentist that they have been started on a DOAC, in case they need to be taken off for a procedure.  Discussed all important drug interactions, including over-the-counter medications and supplements.  Instructed the patient not to begin or discontinue any medications without informing his/her physician/pharmacist.     Patient expressed understanding and had no further questions.      Mattie Zabala, [facility]  Clinical Pharmacist

## 2023-10-19 NOTE — ED NOTES
"Pt to ED for R lower leg pain. Pt had surgery in September. Pt had a follow up two days ago and  x-ray showed \"loose screw\". Pt was not able to give any other detail.     "

## 2024-01-08 ENCOUNTER — HOSPITAL ENCOUNTER (INPATIENT)
Facility: HOSPITAL | Age: 44
LOS: 1 days | Discharge: HOME | End: 2024-01-10
Attending: EMERGENCY MEDICINE | Admitting: STUDENT IN AN ORGANIZED HEALTH CARE EDUCATION/TRAINING PROGRAM
Payer: MEDICARE

## 2024-01-08 DIAGNOSIS — R29.898 BILATERAL LEG WEAKNESS: ICD-10-CM

## 2024-01-08 DIAGNOSIS — N18.9 CHRONIC KIDNEY DISEASE, UNSPECIFIED CKD STAGE: ICD-10-CM

## 2024-01-08 DIAGNOSIS — F19.90 IVDU (INTRAVENOUS DRUG USER): ICD-10-CM

## 2024-01-08 DIAGNOSIS — M62.82 NON-TRAUMATIC RHABDOMYOLYSIS: Primary | ICD-10-CM

## 2024-01-08 DIAGNOSIS — Z79.01 CHRONIC ANTICOAGULATION: ICD-10-CM

## 2024-01-08 LAB
ALBUMIN SERPL-MCNC: 2.9 G/DL (ref 3.5–5.2)
ALBUMIN/GLOB SERPL: 1.2 G/DL
ALP SERPL-CCNC: 68 U/L (ref 39–117)
ALT SERPL W P-5'-P-CCNC: 16 U/L (ref 1–41)
ANION GAP SERPL CALCULATED.3IONS-SCNC: 8.1 MMOL/L (ref 5–15)
AST SERPL-CCNC: 24 U/L (ref 1–40)
BASOPHILS # BLD AUTO: 0.02 10*3/MM3 (ref 0–0.2)
BASOPHILS NFR BLD AUTO: 0.3 % (ref 0–1.5)
BILIRUB SERPL-MCNC: <0.2 MG/DL (ref 0–1.2)
BUN SERPL-MCNC: 14 MG/DL (ref 6–20)
BUN/CREAT SERPL: 12.8 (ref 7–25)
CALCIUM SPEC-SCNC: 8.3 MG/DL (ref 8.6–10.5)
CHLORIDE SERPL-SCNC: 105 MMOL/L (ref 98–107)
CK SERPL-CCNC: 1089 U/L (ref 20–200)
CO2 SERPL-SCNC: 24.9 MMOL/L (ref 22–29)
CREAT SERPL-MCNC: 1.09 MG/DL (ref 0.76–1.27)
DEPRECATED RDW RBC AUTO: 47.5 FL (ref 37–54)
EGFRCR SERPLBLD CKD-EPI 2021: 86.4 ML/MIN/1.73
EOSINOPHIL # BLD AUTO: 0.14 10*3/MM3 (ref 0–0.4)
EOSINOPHIL NFR BLD AUTO: 2.4 % (ref 0.3–6.2)
ERYTHROCYTE [DISTWIDTH] IN BLOOD BY AUTOMATED COUNT: 14.9 % (ref 12.3–15.4)
ETHANOL BLD-MCNC: <10 MG/DL (ref 0–10)
ETHANOL UR QL: <0.01 %
GLOBULIN UR ELPH-MCNC: 2.4 GM/DL
GLUCOSE SERPL-MCNC: 94 MG/DL (ref 65–99)
HCT VFR BLD AUTO: 28.1 % (ref 37.5–51)
HGB BLD-MCNC: 9.6 G/DL (ref 13–17.7)
HOLD SPECIMEN: NORMAL
HOLD SPECIMEN: NORMAL
IMM GRANULOCYTES # BLD AUTO: 0.01 10*3/MM3 (ref 0–0.05)
IMM GRANULOCYTES NFR BLD AUTO: 0.2 % (ref 0–0.5)
LYMPHOCYTES # BLD AUTO: 1.8 10*3/MM3 (ref 0.7–3.1)
LYMPHOCYTES NFR BLD AUTO: 31 % (ref 19.6–45.3)
MAGNESIUM SERPL-MCNC: 1.8 MG/DL (ref 1.6–2.6)
MCH RBC QN AUTO: 29.9 PG (ref 26.6–33)
MCHC RBC AUTO-ENTMCNC: 34.2 G/DL (ref 31.5–35.7)
MCV RBC AUTO: 87.5 FL (ref 79–97)
MONOCYTES # BLD AUTO: 0.51 10*3/MM3 (ref 0.1–0.9)
MONOCYTES NFR BLD AUTO: 8.8 % (ref 5–12)
NEUTROPHILS NFR BLD AUTO: 3.33 10*3/MM3 (ref 1.7–7)
NEUTROPHILS NFR BLD AUTO: 57.3 % (ref 42.7–76)
NRBC BLD AUTO-RTO: 0 /100 WBC (ref 0–0.2)
PHOSPHATE SERPL-MCNC: 3.7 MG/DL (ref 2.5–4.5)
PLATELET # BLD AUTO: 335 10*3/MM3 (ref 140–450)
PMV BLD AUTO: 10.7 FL (ref 6–12)
POTASSIUM SERPL-SCNC: 3.4 MMOL/L (ref 3.5–5.2)
PROT SERPL-MCNC: 5.3 G/DL (ref 6–8.5)
RBC # BLD AUTO: 3.21 10*6/MM3 (ref 4.14–5.8)
SODIUM SERPL-SCNC: 138 MMOL/L (ref 136–145)
WBC NRBC COR # BLD AUTO: 5.81 10*3/MM3 (ref 3.4–10.8)
WHOLE BLOOD HOLD COAG: NORMAL
WHOLE BLOOD HOLD SPECIMEN: NORMAL

## 2024-01-08 PROCEDURE — 80053 COMPREHEN METABOLIC PANEL: CPT | Performed by: EMERGENCY MEDICINE

## 2024-01-08 PROCEDURE — 82550 ASSAY OF CK (CPK): CPT | Performed by: EMERGENCY MEDICINE

## 2024-01-08 PROCEDURE — 82077 ASSAY SPEC XCP UR&BREATH IA: CPT | Performed by: EMERGENCY MEDICINE

## 2024-01-08 PROCEDURE — G0378 HOSPITAL OBSERVATION PER HR: HCPCS

## 2024-01-08 PROCEDURE — 82607 VITAMIN B-12: CPT | Performed by: NURSE PRACTITIONER

## 2024-01-08 PROCEDURE — 84100 ASSAY OF PHOSPHORUS: CPT | Performed by: EMERGENCY MEDICINE

## 2024-01-08 PROCEDURE — 85025 COMPLETE CBC W/AUTO DIFF WBC: CPT | Performed by: EMERGENCY MEDICINE

## 2024-01-08 PROCEDURE — 99285 EMERGENCY DEPT VISIT HI MDM: CPT

## 2024-01-08 PROCEDURE — 83735 ASSAY OF MAGNESIUM: CPT | Performed by: EMERGENCY MEDICINE

## 2024-01-08 PROCEDURE — 25810000003 LACTATED RINGERS PER 1000 ML: Performed by: EMERGENCY MEDICINE

## 2024-01-08 PROCEDURE — 25810000003 LACTATED RINGERS SOLUTION: Performed by: EMERGENCY MEDICINE

## 2024-01-08 RX ORDER — AMOXICILLIN 250 MG
2 CAPSULE ORAL 2 TIMES DAILY
Status: DISCONTINUED | OUTPATIENT
Start: 2024-01-08 | End: 2024-01-10 | Stop reason: HOSPADM

## 2024-01-08 RX ORDER — SODIUM CHLORIDE 0.9 % (FLUSH) 0.9 %
10 SYRINGE (ML) INJECTION EVERY 12 HOURS SCHEDULED
Status: DISCONTINUED | OUTPATIENT
Start: 2024-01-08 | End: 2024-01-10 | Stop reason: HOSPADM

## 2024-01-08 RX ORDER — NITROGLYCERIN 0.4 MG/1
0.4 TABLET SUBLINGUAL
Status: DISCONTINUED | OUTPATIENT
Start: 2024-01-08 | End: 2024-01-10 | Stop reason: HOSPADM

## 2024-01-08 RX ORDER — SODIUM CHLORIDE 9 MG/ML
40 INJECTION, SOLUTION INTRAVENOUS AS NEEDED
Status: DISCONTINUED | OUTPATIENT
Start: 2024-01-08 | End: 2024-01-10 | Stop reason: HOSPADM

## 2024-01-08 RX ORDER — BISACODYL 5 MG/1
5 TABLET, DELAYED RELEASE ORAL DAILY PRN
Status: DISCONTINUED | OUTPATIENT
Start: 2024-01-08 | End: 2024-01-10 | Stop reason: HOSPADM

## 2024-01-08 RX ORDER — SODIUM CHLORIDE 0.9 % (FLUSH) 0.9 %
10 SYRINGE (ML) INJECTION AS NEEDED
Status: DISCONTINUED | OUTPATIENT
Start: 2024-01-08 | End: 2024-01-10 | Stop reason: HOSPADM

## 2024-01-08 RX ORDER — POLYETHYLENE GLYCOL 3350 17 G/17G
17 POWDER, FOR SOLUTION ORAL DAILY PRN
Status: DISCONTINUED | OUTPATIENT
Start: 2024-01-08 | End: 2024-01-10 | Stop reason: HOSPADM

## 2024-01-08 RX ORDER — PREDNISONE 20 MG/1
20 TABLET ORAL DAILY
COMMUNITY

## 2024-01-08 RX ORDER — BISACODYL 10 MG
10 SUPPOSITORY, RECTAL RECTAL DAILY PRN
Status: DISCONTINUED | OUTPATIENT
Start: 2024-01-08 | End: 2024-01-10 | Stop reason: HOSPADM

## 2024-01-08 RX ORDER — NICOTINE 21 MG/24HR
1 PATCH, TRANSDERMAL 24 HOURS TRANSDERMAL
Status: DISCONTINUED | OUTPATIENT
Start: 2024-01-08 | End: 2024-01-10 | Stop reason: HOSPADM

## 2024-01-08 RX ORDER — SODIUM CHLORIDE, SODIUM LACTATE, POTASSIUM CHLORIDE, CALCIUM CHLORIDE 600; 310; 30; 20 MG/100ML; MG/100ML; MG/100ML; MG/100ML
150 INJECTION, SOLUTION INTRAVENOUS CONTINUOUS
Status: DISCONTINUED | OUTPATIENT
Start: 2024-01-08 | End: 2024-01-09

## 2024-01-08 RX ADMIN — SODIUM CHLORIDE, POTASSIUM CHLORIDE, SODIUM LACTATE AND CALCIUM CHLORIDE 1000 ML: 600; 310; 30; 20 INJECTION, SOLUTION INTRAVENOUS at 16:40

## 2024-01-08 RX ADMIN — SODIUM CHLORIDE, POTASSIUM CHLORIDE, SODIUM LACTATE AND CALCIUM CHLORIDE 150 ML/HR: 600; 310; 30; 20 INJECTION, SOLUTION INTRAVENOUS at 19:52

## 2024-01-08 RX ADMIN — NICOTINE 1 PATCH: 21 PATCH, EXTENDED RELEASE TRANSDERMAL at 18:05

## 2024-01-08 RX ADMIN — Medication 10 ML: at 22:19

## 2024-01-08 NOTE — ED TRIAGE NOTES
Pt to ed from home via PV    Pt c/o body wide pain. Pt recently at [facility] for rhabdomyolysis. Pt reports he left [facility] due to being anxious. Pt also reports he is on methadone but had not taken it since 12/3  so pt reports relapsing on heroin 3 days ago.  Pt also reports multiple falls over past few days

## 2024-01-08 NOTE — ED PROVIDER NOTES
EMERGENCY DEPARTMENT ENCOUNTER    Room Number:  23   PCP: Provider, No Known  Historian: Patient      HPI:  Chief Complaint: Body wide pain  A complete HPI/ROS/PMH/PSH/SH/FH are unobtainable due to: Nothing  Context: Ryan Marina is a 43 y.o. male who presents to the ED c/o body wide pain.  He states that he recently left [facility] AGAINST MEDICAL ADVICE after being admitted for rhabdomyolysis and acute renal failure.  He does have a history of chronic kidney disease and he is followed by Dr. Vera with [facility].  He states that he has a underlying kidney condition and his baseline creatinine is typically around 1.6 but sometimes it will go up to 2 or higher.  He has a history of heroin abuse.  He has been on methadone but he had missed a couple doses recently which contributed to him relapsing and he last use IV heroin a few days ago.  He is also a smoker.  He states that he has been falling frequently recently.  His legs will just give out on him.  He has probably fallen 15 times in the last week.            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    FRACTURE SURGERY      KNEE ACL RECONSTRUCTION      1996 and 1999--anterior cruciate ligament reconstruction right knee.         FAMILY HISTORY  Family History   Problem Relation Age of Onset    Diabetes Mother         Type 2    Hyperthyroidism Father     Diabetes Maternal Grandmother         Type 2         SOCIAL HISTORY  Social History     Socioeconomic History    Marital status: Single   Tobacco Use    Smoking status: Every Day     Packs/day: 1.5     Types: Cigarettes   Substance and Sexual Activity    Alcohol use: No    Drug use: Yes     Types: Heroin    Sexual activity: Defer         ALLERGIES  Patient has no known allergies.        REVIEW OF SYSTEMS  Review of Systems   Review of all 14 systems is negative other than stated in the HPI above.      PHYSICAL EXAM  ED Triage Vitals   Temp Heart Rate Resp BP SpO2   01/08/24 1508 01/08/24 1508 01/08/24 1508 01/08/24 1519 01/08/24 1508   98.8 °F (37.1 °C) 113 20 157/83 98 %      Temp src Heart Rate Source Patient Position BP Location FiO2 (%)   01/08/24 1508 01/08/24 1508 01/08/24 1519 01/08/24 1519 --   Tympanic Monitor Lying Right arm        Physical Exam      GENERAL: Awake and alert, ungroomed appearing, no acute distress, dried blood on the face and scalp  HENT: nares patent, mild right supraorbital swelling  EYES: no scleral icterus, pupils 3 mm reactive bilaterally, EOMI  CV: regular rhythm, normal rate, 1+ pitting edema bilateral lower extremities  RESPIRATORY: normal effort, lungs clear to auscultation bilaterally  ABDOMEN: soft, nondistended, nontender throughout  MUSCULOSKELETAL: no deformity  NEURO: alert, moves all extremities, follows commands, cranial nerves II through XII grossly intact, speech fluent and clear  PSYCH:  calm, cooperative  SKIN: warm, dry    Vital signs and nursing notes reviewed.          LAB RESULTS  Recent Results (from the past 24 hour(s))   Green Top (Gel)     Collection Time: 01/08/24  4:27 PM   Result Value Ref Range    Extra Tube Hold for add-ons.    Lavender Top    Collection Time: 01/08/24  4:27 PM   Result Value Ref Range    Extra Tube hold for add-on    Gold Top - SST    Collection Time: 01/08/24  4:27 PM   Result Value Ref Range    Extra Tube Hold for add-ons.    Light Blue Top    Collection Time: 01/08/24  4:27 PM   Result Value Ref Range    Extra Tube Hold for add-ons.    CBC Auto Differential    Collection Time: 01/08/24  4:27 PM    Specimen: Blood   Result Value Ref Range    WBC 5.81 3.40 - 10.80 10*3/mm3    RBC 3.21 (L) 4.14 - 5.80 10*6/mm3    Hemoglobin 9.6 (L) 13.0 - 17.7 g/dL    Hematocrit 28.1 (L) 37.5 - 51.0 %    MCV 87.5 79.0 - 97.0 fL    MCH 29.9 26.6 - 33.0 pg    MCHC 34.2 31.5 - 35.7 g/dL    RDW 14.9 12.3 - 15.4 %    RDW-SD 47.5 37.0 - 54.0 fl    MPV 10.7 6.0 - 12.0 fL    Platelets 335 140 - 450 10*3/mm3    Neutrophil % 57.3 42.7 - 76.0 %    Lymphocyte % 31.0 19.6 - 45.3 %    Monocyte % 8.8 5.0 - 12.0 %    Eosinophil % 2.4 0.3 - 6.2 %    Basophil % 0.3 0.0 - 1.5 %    Immature Grans % 0.2 0.0 - 0.5 %    Neutrophils, Absolute 3.33 1.70 - 7.00 10*3/mm3    Lymphocytes, Absolute 1.80 0.70 - 3.10 10*3/mm3    Monocytes, Absolute 0.51 0.10 - 0.90 10*3/mm3    Eosinophils, Absolute 0.14 0.00 - 0.40 10*3/mm3    Basophils, Absolute 0.02 0.00 - 0.20 10*3/mm3    Immature Grans, Absolute 0.01 0.00 - 0.05 10*3/mm3    nRBC 0.0 0.0 - 0.2 /100 WBC   Phosphorus    Collection Time: 01/08/24  4:27 PM    Specimen: Blood   Result Value Ref Range    Phosphorus 3.7 2.5 - 4.5 mg/dL   Comprehensive Metabolic Panel    Collection Time: 01/08/24  5:20 PM    Specimen: Blood   Result Value Ref Range    Glucose 94 65 - 99 mg/dL    BUN 14 6 - 20 mg/dL    Creatinine 1.09 0.76 - 1.27 mg/dL    Sodium 138 136 - 145 mmol/L    Potassium 3.4 (L) 3.5 - 5.2 mmol/L    Chloride 105 98 - 107 mmol/L    CO2 24.9 22.0 - 29.0 mmol/L    Calcium 8.3 (L) 8.6 - 10.5 mg/dL    Total Protein 5.3 (L) 6.0  - 8.5 g/dL    Albumin 2.9 (L) 3.5 - 5.2 g/dL    ALT (SGPT) 16 1 - 41 U/L    AST (SGOT) 24 1 - 40 U/L    Alkaline Phosphatase 68 39 - 117 U/L    Total Bilirubin <0.2 0.0 - 1.2 mg/dL    Globulin 2.4 gm/dL    A/G Ratio 1.2 g/dL    BUN/Creatinine Ratio 12.8 7.0 - 25.0    Anion Gap 8.1 5.0 - 15.0 mmol/L    eGFR 86.4 >60.0 mL/min/1.73   CK    Collection Time: 01/08/24  5:20 PM    Specimen: Blood   Result Value Ref Range    Creatine Kinase 1,089 (H) 20 - 200 U/L   Magnesium    Collection Time: 01/08/24  5:20 PM    Specimen: Blood   Result Value Ref Range    Magnesium 1.8 1.6 - 2.6 mg/dL   Ethanol    Collection Time: 01/08/24  5:20 PM    Specimen: Blood   Result Value Ref Range    Ethanol <10 0 - 10 mg/dL    Ethanol % <0.010 %       Ordered the above labs and reviewed the results.        RADIOLOGY  No Radiology Exams Resulted Within Past 24 Hours    Ordered the above noted radiological studies. Reviewed by me in PACS.            PROCEDURES  Procedures              MEDICATIONS GIVEN IN ER  Medications   nicotine (NICODERM CQ) 21 MG/24HR patch 1 patch (1 patch Transdermal Medication Applied 1/8/24 1805)   lactated ringers infusion (150 mL/hr Intravenous New Bag 1/8/24 1952)   sodium chloride 0.9 % flush 10 mL (has no administration in time range)   sodium chloride 0.9 % flush 10 mL (has no administration in time range)   sodium chloride 0.9 % infusion 40 mL (has no administration in time range)   sennosides-docusate (PERICOLACE) 8.6-50 MG per tablet 2 tablet (has no administration in time range)     And   polyethylene glycol (MIRALAX) packet 17 g (has no administration in time range)     And   bisacodyl (DULCOLAX) EC tablet 5 mg (has no administration in time range)     And   bisacodyl (DULCOLAX) suppository 10 mg (has no administration in time range)   nitroglycerin (NITROSTAT) SL tablet 0.4 mg (has no administration in time range)   lactated ringers bolus 1,000 mL (0 mL Intravenous Stopped 1/8/24 1952)                    MEDICAL DECISION MAKING, PROGRESS, and CONSULTS    All labs have been independently reviewed by me.  All radiology studies have been reviewed by me and I have also reviewed the radiology report.   EKG's independently viewed and interpreted by me.  Discussion below represents my analysis of pertinent findings related to patient's condition, differential diagnosis, treatment plan and final disposition.      Additional sources:  - Discussed/ obtained information from independent historians:  n/a    - External (non-ED) record review: recent discharge summary from [facility] reviewed and summarized below    - Chronic or social conditions impacting care: IV drug use    - Shared decision making:  patient informed of need for admission and is agreeable with plan.       Orders placed during this visit:  Orders Placed This Encounter   Procedures    [place] Draw    Comprehensive Metabolic Panel    CK    CBC Auto Differential    Magnesium    Phosphorus    Ethanol    Urine Drug Screen - Urine, Clean Catch    Basic Metabolic Panel    CBC (No Diff)    Diet: Regular/House Diet; Texture: Regular Texture (IDDSI 7); Fluid Consistency: Thin (IDDSI 0)    Vital Signs    Intake & Output    Weigh Patient    Oral Care    Place Sequential Compression Device    Maintain Sequential Compression Device    Telemetry - Maintain IV Access    Telemetry - Place Orders & Notify Provider of Results When Patient Experiences Acute Chest Pain, Dysrhythmia or Respiratory Distress    May Be Off Telemetry for Tests    Code Status and Medical Interventions:    [facility] (on-call MD unless specified) Details    Inpatient Neurology Consult General    Insert Peripheral IV    Initiate Observation Status    Green Top (Gel)    Lavender Top    Gold Top - SST    Light Blue Top    CBC & Differential         Additional orders considered but not ordered:  N/a      Differential diagnosis includes but is not limited to:    Rhabdomyolysis  Acute renal  failure  Sepsis  Opioid withdrawal  Endocarditis        Independent interpretation of labs, radiology studies, and discussions with consultants:  ED Course as of 01/08/24 2208 Mon Jan 08, 2024   1619 Record review: I reviewed records from recent admission at [facility] patient was hospitalized for rhabdomyolysis after a fall.  His CK was elevated at 40,591.  Venous duplex bilateral lower extremities was negative for DVT or SVT.  CTA chest was negative for pulmonary embolus. [JR]   1721 Hemoglobin(!): 9.6 [JR]   1721 WBC: 5.81 [JR]   1800 Creatine Kinase(!): 1,089 [JR]   1800 Creatinine: 1.09 [JR]   1831 Patient informed of plan for admission.  His CK is markedly improved today and his creatinine is normal which is excellent.  I have ordered LR at 150 cc an hour. [JR]   1855 Discussed with Dr. Quinn, [facility], who agrees to admit. [JR]      ED Course User Index  [JR] Ambrose Liu MD             DIAGNOSIS  Final diagnoses:   Non-traumatic rhabdomyolysis   IVDU (intravenous drug user)   Chronic kidney disease, unspecified CKD stage   Bilateral leg weakness   Chronic anticoagulation         DISPOSITION  ADMIT            Latest Documented Vital Signs:  As of 22:08 EST  BP- 113/57 HR- 95 Temp- 99.1 °F (37.3 °C) (Oral) O2 sat- 100%              --    Please note that portions of this were completed with a voice recognition program.       Note Disclaimer: At [facility], we believe that sharing information builds trust and better relationships. You are receiving this note because you are receiving care at [facility] or recently visited. It is possible you will see health information before a provider has talked with you about it. This kind of information can be easy to misunderstand. To help you fully understand what it means for your health, we urge you to discuss this note with your provider.             Ambrose Liu MD  01/08/24 8394

## 2024-01-08 NOTE — ED NOTES
Nursing report ED to floor  Ryan Marina  43 y.o.  male    HPI :   Chief Complaint   Patient presents with    Generalized Body Aches       Admitting doctor:   Bandar Quinn MD    Admitting diagnosis:   The primary encounter diagnosis was Non-traumatic rhabdomyolysis. Diagnoses of IVDU (intravenous drug user), Chronic kidney disease, unspecified CKD stage, Bilateral leg weakness, and Chronic anticoagulation were also pertinent to this visit.    Code status:   Current Code Status       Date Active Code Status Order ID Comments User Context       Not on file            Allergies:   Patient has no known allergies.    Isolation:   No active isolations    Intake and Output  No intake or output data in the 24 hours ending 01/08/24 1859    Weight:       01/08/24  1519   Weight: 59 kg (130 lb)       Most recent vitals:   Vitals:    01/08/24 1615 01/08/24 1748 01/08/24 1807 01/08/24 1830   BP: 124/82      BP Location:       Patient Position:       Pulse:  99 104 99   Resp:       Temp:       TempSrc:       SpO2:  98% 100% 100%   Weight:       Height:           Active LDAs/IV Access:   Lines, Drains & Airways       Active LDAs       Name Placement date Placement time Site Days    Peripheral IV 10/19/23 1722 Posterior;Right Forearm 10/19/23  1722  Forearm  81    Peripheral IV 01/08/24 1639 Anterior;Right;Upper Arm 01/08/24  1639  Arm  less than 1                    Labs (abnormal labs have a star):   Labs Reviewed   COMPREHENSIVE METABOLIC PANEL - Abnormal; Notable for the following components:       Result Value    Potassium 3.4 (*)     Calcium 8.3 (*)     Total Protein 5.3 (*)     Albumin 2.9 (*)     All other components within normal limits    Narrative:     GFR Normal >60  Chronic Kidney Disease <60  Kidney Failure <15     CK - Abnormal; Notable for the following components:    Creatine Kinase 1,089 (*)     All other components within normal limits   CBC WITH AUTO DIFFERENTIAL - Abnormal; Notable for the following components:     RBC 3.21 (*)     Hemoglobin 9.6 (*)     Hematocrit 28.1 (*)     All other components within normal limits   MAGNESIUM - Normal   PHOSPHORUS - Normal   RAINBOW DRAW    Narrative:     The following orders were created for panel order [place] Draw.  Procedure                               Abnormality         Status                     ---------                               -----------         ------                     Green Top (Gel)[[id number]]                                  Final result               Lavender Top[[id number]]                                     Final result               Gold Top - SST[[id number]]                                   Final result               Light Blue Top[[id number]]                                   Final result                 Please view results for these tests on the individual orders.   ETHANOL   URINE DRUG SCREEN   GREEN TOP   LAVENDER TOP   GOLD TOP - SST   LIGHT BLUE TOP   CBC AND DIFFERENTIAL    Narrative:     The following orders were created for panel order CBC & Differential.  Procedure                               Abnormality         Status                     ---------                               -----------         ------                     CBC Auto Differential[[id number]]        Abnormal            Final result                 Please view results for these tests on the individual orders.       EKG:   No orders to display       Meds given in ED:   Medications   nicotine (NICODERM CQ) 21 MG/24HR patch 1 patch (1 patch Transdermal Medication Applied 1/8/24 2165)   lactated ringers infusion (has no administration in time range)   lactated ringers bolus 1,000 mL (1,000 mL Intravenous New Bag 1/8/24 1640)       Imaging results:  No radiology results for the last day    Ambulatory status:   - up with assist    Social issues:   Social History     Socioeconomic History    Marital status: Single   Tobacco Use    Smoking status: Every Day     Packs/day: 1.5     Types:  Cigarettes   Substance and Sexual Activity    Alcohol use: No    Drug use: Not Currently     Comment: recovered  clean for 6 months on methadone    Sexual activity: Defer       NIH Stroke Scale:       Clara Medellin RN  01/08/24 18:59 EST

## 2024-01-09 LAB
AMPHET+METHAMPHET UR QL: NEGATIVE
ANION GAP SERPL CALCULATED.3IONS-SCNC: 9 MMOL/L (ref 5–15)
BARBITURATES UR QL SCN: NEGATIVE
BENZODIAZ UR QL SCN: POSITIVE
BUN SERPL-MCNC: 10 MG/DL (ref 6–20)
BUN/CREAT SERPL: 9.3 (ref 7–25)
CALCIUM SPEC-SCNC: 7.9 MG/DL (ref 8.6–10.5)
CANNABINOIDS SERPL QL: NEGATIVE
CHLORIDE SERPL-SCNC: 106 MMOL/L (ref 98–107)
CK SERPL-CCNC: 760 U/L (ref 20–200)
CO2 SERPL-SCNC: 24 MMOL/L (ref 22–29)
COCAINE UR QL: NEGATIVE
CREAT SERPL-MCNC: 1.08 MG/DL (ref 0.76–1.27)
CREAT UR-MCNC: 91 MG/DL
DEPRECATED RDW RBC AUTO: 47.5 FL (ref 37–54)
EGFRCR SERPLBLD CKD-EPI 2021: 87.3 ML/MIN/1.73
ERYTHROCYTE [DISTWIDTH] IN BLOOD BY AUTOMATED COUNT: 14.9 % (ref 12.3–15.4)
FENTANYL UR-MCNC: POSITIVE NG/ML
GLUCOSE SERPL-MCNC: 87 MG/DL (ref 65–99)
HCT VFR BLD AUTO: 24.9 % (ref 37.5–51)
HGB BLD-MCNC: 8.2 G/DL (ref 13–17.7)
IRON 24H UR-MRATE: 48 MCG/DL (ref 59–158)
IRON SATN MFR SERPL: 18 % (ref 20–50)
MCH RBC QN AUTO: 29 PG (ref 26.6–33)
MCHC RBC AUTO-ENTMCNC: 32.9 G/DL (ref 31.5–35.7)
MCV RBC AUTO: 88 FL (ref 79–97)
METHADONE UR QL SCN: NEGATIVE
NT-PROBNP SERPL-MCNC: 511 PG/ML (ref 0–450)
OPIATES UR QL: NEGATIVE
OXYCODONE UR QL SCN: NEGATIVE
PLATELET # BLD AUTO: 328 10*3/MM3 (ref 140–450)
PMV BLD AUTO: 9.1 FL (ref 6–12)
POTASSIUM SERPL-SCNC: 3.9 MMOL/L (ref 3.5–5.2)
PROT ?TM UR-MCNC: 12.9 MG/DL
PROT/CREAT UR: 141.8 MG/G CREA (ref 0–200)
RBC # BLD AUTO: 2.83 10*6/MM3 (ref 4.14–5.8)
SODIUM SERPL-SCNC: 139 MMOL/L (ref 136–145)
TIBC SERPL-MCNC: 271 MCG/DL (ref 298–536)
TRANSFERRIN SERPL-MCNC: 182 MG/DL (ref 200–360)
WBC NRBC COR # BLD AUTO: 5.49 10*3/MM3 (ref 3.4–10.8)

## 2024-01-09 PROCEDURE — 82550 ASSAY OF CK (CPK): CPT | Performed by: STUDENT IN AN ORGANIZED HEALTH CARE EDUCATION/TRAINING PROGRAM

## 2024-01-09 PROCEDURE — 80307 DRUG TEST PRSMV CHEM ANLYZR: CPT | Performed by: EMERGENCY MEDICINE

## 2024-01-09 PROCEDURE — 85027 COMPLETE CBC AUTOMATED: CPT | Performed by: STUDENT IN AN ORGANIZED HEALTH CARE EDUCATION/TRAINING PROGRAM

## 2024-01-09 PROCEDURE — 80048 BASIC METABOLIC PNL TOTAL CA: CPT | Performed by: STUDENT IN AN ORGANIZED HEALTH CARE EDUCATION/TRAINING PROGRAM

## 2024-01-09 PROCEDURE — 36415 COLL VENOUS BLD VENIPUNCTURE: CPT | Performed by: STUDENT IN AN ORGANIZED HEALTH CARE EDUCATION/TRAINING PROGRAM

## 2024-01-09 PROCEDURE — 84466 ASSAY OF TRANSFERRIN: CPT

## 2024-01-09 PROCEDURE — 83880 ASSAY OF NATRIURETIC PEPTIDE: CPT

## 2024-01-09 PROCEDURE — 82570 ASSAY OF URINE CREATININE: CPT

## 2024-01-09 PROCEDURE — 63710000001 MYCOPHENOLATE MOFETIL PER 250 MG: Performed by: STUDENT IN AN ORGANIZED HEALTH CARE EDUCATION/TRAINING PROGRAM

## 2024-01-09 PROCEDURE — 83540 ASSAY OF IRON: CPT

## 2024-01-09 PROCEDURE — 25810000003 LACTATED RINGERS PER 1000 ML: Performed by: EMERGENCY MEDICINE

## 2024-01-09 PROCEDURE — 99223 1ST HOSP IP/OBS HIGH 75: CPT | Performed by: PSYCHIATRY & NEUROLOGY

## 2024-01-09 PROCEDURE — 84156 ASSAY OF PROTEIN URINE: CPT

## 2024-01-09 RX ORDER — UREA 10 %
3 LOTION (ML) TOPICAL NIGHTLY PRN
Status: DISCONTINUED | OUTPATIENT
Start: 2024-01-09 | End: 2024-01-10 | Stop reason: HOSPADM

## 2024-01-09 RX ORDER — POTASSIUM CHLORIDE 750 MG/1
40 TABLET, FILM COATED, EXTENDED RELEASE ORAL ONCE
Status: DISCONTINUED | OUTPATIENT
Start: 2024-01-09 | End: 2024-01-09

## 2024-01-09 RX ORDER — MYCOPHENOLATE MOFETIL 500 MG/1
1000 TABLET ORAL EVERY 12 HOURS SCHEDULED
Status: DISCONTINUED | OUTPATIENT
Start: 2024-01-09 | End: 2024-01-10 | Stop reason: HOSPADM

## 2024-01-09 RX ORDER — METHADONE HYDROCHLORIDE 5 MG/1
115 TABLET ORAL DAILY
Status: DISCONTINUED | OUTPATIENT
Start: 2024-01-09 | End: 2024-01-09

## 2024-01-09 RX ADMIN — Medication 10 ML: at 20:56

## 2024-01-09 RX ADMIN — NICOTINE 1 PATCH: 21 PATCH, EXTENDED RELEASE TRANSDERMAL at 10:09

## 2024-01-09 RX ADMIN — SODIUM CHLORIDE, POTASSIUM CHLORIDE, SODIUM LACTATE AND CALCIUM CHLORIDE 150 ML/HR: 600; 310; 30; 20 INJECTION, SOLUTION INTRAVENOUS at 03:04

## 2024-01-09 RX ADMIN — Medication 10 ML: at 10:11

## 2024-01-09 RX ADMIN — MYCOPHENOLATE MOFETIL 1000 MG: 500 TABLET ORAL at 20:56

## 2024-01-09 RX ADMIN — MYCOPHENOLATE MOFETIL 1000 MG: 500 TABLET ORAL at 11:58

## 2024-01-09 RX ADMIN — METHADONE HYDROCHLORIDE 115 MG: 5 TABLET ORAL at 10:47

## 2024-01-09 NOTE — CASE MANAGEMENT/SOCIAL WORK
Discharge Planning Assessment  [facility]     Patient Name: Ryan Marina  MRN: [id number]  Today's Date: 1/9/2024    Admit Date: 1/8/2024    Plan: Home, may need assistance with transportation home   Discharge Needs Assessment       Row Name 01/09/24 1024       Living Environment    People in Home parent(s)    Name(s) of People in Home Kali Marina [phone]    Current Living Arrangements home    Potentially Unsafe Housing Conditions none    In the past 12 months has the electric, gas, oil, or water company threatened to shut off services in your home? No    Primary Care Provided by self    Provides Primary Care For no one    Family Caregiver if Needed none    Quality of Family Relationships unable to assess    Able to Return to Prior Arrangements yes    Living Arrangement Comments Pt lives with his father       Resource/Environmental Concerns    Resource/Environmental Concerns none    Transportation Concerns none       Transportation Needs    In the past 12 months, has lack of transportation kept you from medical appointments or from getting medications? no    In the past 12 months, has lack of transportation kept you from meetings, work, or from getting things needed for daily living? No       Food Insecurity    Within the past 12 months, you worried that your food would run out before you got the money to buy more. Never true    Within the past 12 months, the food you bought just didn't last and you didn't have money to get more. Never true       Transition Planning    Patient/Family Anticipates Transition to home    Patient/Family Anticipated Services at Transition none    Transportation Anticipated family or friend will provide       Discharge Needs Assessment    Equipment Currently Used at Home none    Concerns to be Addressed no discharge needs identified;denies needs/concerns at this time    Anticipated Changes Related to Illness none    Equipment Needed After Discharge none                   Discharge  Plan       Row Name 01/09/24 1026       Plan    Plan Home, may need assistance with transportation home    Patient/Family in Agreement with Plan yes    Plan Comments Met with pt at bedside. Introduced self and explained role of . Face sheet verified, PCP is [facility]. Pt obtains his medications from [facility] and a General acute hospital clinic. Pt lives with his father, but stated that he is independent with his care needs. Pt does not use nor require, any assistive devices. Pt has never utilized home health, or been to a SNF, has been in/out of substance abuse treatment programs and he is not interested in any information at this time. Pt stated that he may need assistance with transportation home. Pt stated that his medicaid benefits terminated. Requested [employer] to screen pt, after screening, pt is over income. Explained to pt that CCP would follow to assess for discharge needs.                  Continued Care and Services - Admitted Since 1/8/2024    Coordination has not been started for this encounter.       Expected Discharge Date and Time       Expected Discharge Date Expected Discharge Time    Jan 11, 2024            Demographic Summary    No documentation.                  Functional Status    No documentation.                  Psychosocial    No documentation.                  Abuse/Neglect    No documentation.                  Legal    No documentation.                  Substance Abuse    No documentation.                  Patient Forms    No documentation.                     Anahy Nixon, RN

## 2024-01-09 NOTE — PLAN OF CARE
Goal Outcome Evaluation:  Plan of Care Reviewed With: patient      Pt discontinued on LR due to possible fluid overload and worsening facial edema.    By mid shift edema went down. One time does of methadone 115mg ordered. CK decreased to 720. Pt still has general weakness and ambulates with walker. Nephrology consulted  safety maintained.

## 2024-01-09 NOTE — NURSING NOTE
Confirmed with lab that new urine specimen collected can be added on to previous collection    
Normal for race

## 2024-01-09 NOTE — PROGRESS NOTES
Name: Ryan Marina ADMIT: 2024   : 1980  PCP: Primitivo Link MD    MRN: [id number] LOS: 0 days   AGE/SEX: 43 y.o. male  ROOM: [facility]     Subjective     His face and hands are more puffy and swollen today.  It should be noted that he was previously on methadone however due to a arrest, he was discharged from his previous methadone clinic.  Objective   Objective   Vital Signs  Temp:  [98.6 °F (37 °C)-99.3 °F (37.4 °C)] 99.3 °F (37.4 °C)  Heart Rate:  [] 88  Resp:  [18-20] 18  BP: (113-157)/(47-99) 129/84  SpO2:  [96 %-100 %] 96 %  on   ;   Device (Oxygen Therapy): room air  Body mass index is 20.57 kg/m².  Physical Exam  Constitutional:       General: He is not in acute distress.     Appearance: He is ill-appearing.   HENT:      Head: Normocephalic and atraumatic.   Cardiovascular:      Rate and Rhythm: Normal rate and regular rhythm.   Pulmonary:      Effort: Pulmonary effort is normal. No respiratory distress.   Abdominal:      General: There is no distension.      Palpations: Abdomen is soft.      Tenderness: There is no abdominal tenderness.   Musculoskeletal:      Right lower leg: Edema present.      Left lower leg: Edema present.      Comments: Bilateral arms and legs are edematous.  His eyelids are also puffy.   Skin:     General: Skin is warm and dry.   Neurological:      General: No focal deficit present.      Mental Status: He is alert and oriented to person, place, and time.         Results Review     I reviewed the patient's new clinical results.  Results from last 7 days   Lab Units 24  0711 24  1627   WBC 10*3/mm3 5.49 5.81   HEMOGLOBIN g/dL 8.2* 9.6*   PLATELETS 10*3/mm3 328 335     Results from last 7 days   Lab Units 24  0710 24  1720   SODIUM mmol/L 139 138   POTASSIUM mmol/L 3.9 3.4*   CHLORIDE mmol/L 106 105   CO2 mmol/L 24.0 24.9   BUN mg/dL 10 14   CREATININE mg/dL 1.08 1.09   GLUCOSE mg/dL 87 94   Estimated Creatinine Clearance: 85.8 mL/min (by  "C-G formula based on SCr of 1.08 mg/dL).  Results from last 7 days   Lab Units 01/08/24  1720   ALBUMIN g/dL 2.9*   BILIRUBIN mg/dL <0.2   ALK PHOS U/L 68   AST (SGOT) U/L 24   ALT (SGPT) U/L 16     Results from last 7 days   Lab Units 01/09/24  0710 01/08/24  1720 01/08/24  1627   CALCIUM mg/dL 7.9* 8.3*  --    ALBUMIN g/dL  --  2.9*  --    MAGNESIUM mg/dL  --  1.8  --    PHOSPHORUS mg/dL  --   --  3.7       SARS-CoV-2, OSCAR   Date Value Ref Range Status   12/15/2022 NEGATIVE Negative Final     Comment:     The 2019-CoV rRT-PCR Assay is only for use under a Food and Drug Administration Emergency Use Authorization. The performance characteristics of the assay were verified by the Clinical Laboratory at [facility]. Results should be used in   conjunction with the patient's clinical symptoms, medical history, and other clinical/laboratory findings to determine an overall clinical diagnosis. Negative results do not preclude infection with SARS-CoV-2 (COVID-19).    Test parameters have not been validated for screening asymptomatic patients.   03/21/2022 NEGATIVE Negative Final     No results found for: \"HGBA1C\", \"POCGLU\"  No results found for this or any previous visit.      Duplex Venous Lower Extremity - Right CAR    Acute right lower extremity deep vein thrombosis noted in the   gastrocnemius.    All other right sided veins appeared normal.  XR Ankle 3+ View Right  Narrative: XR ANKLE 3+ VW RIGHT-     INDICATIONS: Surgery, pain, loosened hardware        TECHNIQUE: 3 VIEWS OF THE RIGHT ANKLE     COMPARISON: None available     FINDINGS:     Surgical fixative hardware is seen at the distal tibia and fibula.  Fracture lucencies are still visible. Follow-up/further evaluation can  be obtained as indicated.     Impression:    As described.           This report was finalized on 10/19/2023 6:00 PM by Dr. Isaak De Jesus M.D on Workstation: [id number]       Scheduled Medications  methadone, 115 mg, Oral, " Daily  mycophenolate, 1,000 mg, Oral, Q12H  nicotine, 1 patch, Transdermal, Q24H  senna-docusate sodium, 2 tablet, Oral, BID  sodium chloride, 10 mL, Intravenous, Q12H    Infusions  lactated ringers, 150 mL/hr, Last Rate: 150 mL/hr (01/09/24 0304)    Diet  Diet: Regular/House Diet; Texture: Regular Texture (IDDSI 7); Fluid Consistency: Thin (IDDSI 0)       Assessment/Plan     Active Hospital Problems    Diagnosis  POA    **Rhabdomyolysis [M62.82]  Yes      Resolved Hospital Problems   No resolved problems to display.       43 y.o. male admitted with Rhabdomyolysis.    Rhabdomyolysis  Creatinine kinase is elevated.  Trend  Continue IV fluids.  Nephrology has been consulted.  CK levels are improving.     Generalized weakness  He does endorse some paresthesias in his upper extremities and states that when he initially fell, he was not able to get up because of weakness in his legs.  Neurology consultation appreciated.     Chronic kidney disease  CT glomerulonephropathy  Monitor creatinine levels. Renal following      Heroin use disorder  Previously on methadone however he was discharged from the methadone clinic due to interest.  I will discontinue methadone.  Access consult     Congestive heart failure  Takes Bumex at home.  He is currently receiving IV fluids for rhabdomyolysis so this will be held for now.  Obtain echocardiogram     Acute DVT-October 2023  DVT noted of the right gastrocnemius  Not on Eliquis anymore.      SCDs for DVT prophylaxis.  Full code.  Discussed with patient, nursing staff, CCP, and care team on multidisciplinary rounds.  Anticipate discharge home timing yet to be determined.      Bandar Quinn MD  [facility]  01/09/24  14:37 EST

## 2024-01-09 NOTE — CONSULTS
"Neurology Consult Note    Consult Date: 1/9/2024    Referring MD: Bandar Quinn MD    Reason for Consult I have been asked to see the patient in neurological consultation to render advice and opinion regarding weakness    Ryan Marina is a 43 y.o. male with CHF, glomerulonephritis, polysubstance abuse.  At baseline he takes methadone but recently relapsed to using heroin.  He came to the hospital yesterday complaining of generalized weakness, recurrent falls and severe muscle pain.  Above a week ago he came in with a CK of 41,000.  This improved 2 days later to 21,000 and in the ED on presentation yesterday it was down to 1000.  Patient reports he has continued to have severe generalized weakness and muscle pain as well as worsening generalized edema.  He reports that he frequently has difficulty with edema related to his CHF and kidney disease.    Past Medical History:   Diagnosis Date    Anemia     Arthritis     CHF (congestive heart failure)     Renal disorder     Vitamin D deficiency        Exam  /80 (BP Location: Left arm, Patient Position: Lying)   Pulse 93   Temp 98.6 °F (37 °C) (Oral)   Resp 18   Ht 182.9 cm (72\")   Wt 68.8 kg (151 lb 11.2 oz)   SpO2 99%   BMI 20.57 kg/m²   Gen: NAD, vitals reviewed  HEENT: Conjunctivitis on the right  CVS: Diffuse edema upper and lower extremities  MS: oriented x3, recent/remote memory intact, normal attention/concentration, language intact, no neglect.  CN: visual acuity grossly normal, PERRL, EOMI, no facial droop, no dysarthria  Motor: 4+/5 bilateral upper extremities, 4/5 left lower 4+/5 right lower extremities  Sensory: Diminished to vibration distal right lower extremity  Reflexes: 3+ bilateral upper extremities, Mor's absent, 2+ bilateral lower extremities  MSK: Postoperative changes right ankle    DATA:    Lab Results   Component Value Date    GLUCOSE 87 01/09/2024    CALCIUM 7.9 (L) 01/09/2024     01/09/2024    K 3.9 01/09/2024    CO2 24.0 " 01/09/2024     01/09/2024    BUN 10 01/09/2024    CREATININE 1.08 01/09/2024    EGFRIFAFRI 51 (L) 01/22/2023    BCR 9.3 01/09/2024    ANIONGAP 9.0 01/09/2024     Lab Results   Component Value Date    WBC 5.49 01/09/2024    HGB 8.2 (L) 01/09/2024    HCT 24.9 (L) 01/09/2024    MCV 88.0 01/09/2024     01/09/2024       Lab review: CK 1100.  Was 40,000 12/31.  Hemoglobin 8.2, creatinine 1.1, albumin 2.9    Imaging review: I reviewed imaging reports from 12/29 at [facility] including CT of the head and cervical spine which showed no acute findings.    Diagnoses:  Rhabdomyolysis, improving  Generalized weakness, proximal pattern  CKD/glomerular disease  Congestive heart failure  Polysubstance abuse including heroin    Comment: History of generalized weakness and muscle pain is overall consistent with his severe about recovering rhabdomyolysis.  This was presumably related to drug abuse.  He does have some brisk reflexes in the arms but no evidence of spinal stenosis on his recent CT of the neck.  I think there are reasons to consider inflammatory muscle disease but I would hesitate to pursue EMG and muscle biopsy unless his symptoms fail to resolve with standard medical interventions and drug cessation    PLAN:  -Continue supportive care for rhabdomyolysis, trend CK  -If symptoms do not improve with supportive care, physical therapy and heroin cessation EMG and ultimately muscle biopsy could be considered.  -Check anti-Jo1 antibody    Management discussed with Dr. Quinn

## 2024-01-09 NOTE — PLAN OF CARE
Goal Outcome Evaluation:              Outcome Evaluation: VSS. HAD REQUESTED FLEXERIL AND NOTIFIED L[facility] , NO ORDERS FOR FLEXERIL RECEIVED.  AFTER  MIDNIGHT HE WAS ABLE TO SLEEP AT LONG INTERVALS  WITHOUT FURTHER REQUESTS OR C/O'S.

## 2024-01-09 NOTE — H&P
Patient Name:  Ryan Marina  YOB: 1980  MRN:  [id number]  Admit Date:  1/8/2024  Patient Care Team:  Provider, No Known as PCP - General      Subjective   History Present Illness     Chief Complaint   Patient presents with    Generalized Body Aches     40-year-old male with a history of chronic kidney disease, IV drug abuse comes to the hospital after experiencing body wide pain.  He was recently admitted at [facility] for rhabdomyolysis and acute renal failure.  He did leave AGAINST MEDICAL ADVICE at that time.  He reports using IV heroin just a few days ago.  He is on methadone but has missed a couple doses recently.  In the emergency department CK was 1089, creatinine is 1.09, and white blood cell count was 5.81.      Personal History     Past Medical History:   Diagnosis Date    Anemia     Arthritis     CHF (congestive heart failure)     Renal disorder     Vitamin D deficiency      Past Surgical History:   Procedure Laterality Date    CHOLECYSTECTOMY  10/13/2009    10/13/2009--laparoscopic cholecystectomy.    FRACTURE SURGERY      KNEE ACL RECONSTRUCTION      1996 and 1999--anterior cruciate ligament reconstruction right knee.     Family History   Problem Relation Age of Onset    Diabetes Mother         Type 2    Hyperthyroidism Father     Diabetes Maternal Grandmother         Type 2     Social History     Tobacco Use    Smoking status: Every Day     Packs/day: 1.5     Types: Cigarettes   Substance Use Topics    Alcohol use: No    Drug use: Not Currently     Comment: recovered  clean for 6 months on methadone     No current facility-administered medications on file prior to encounter.     Current Outpatient Medications on File Prior to Encounter   Medication Sig Dispense Refill    Apixaban Starter Pack tablet therapy pack Take two 5 mg tablets by mouth every 12 hours for 7 days. Followed by one 5 mg tablet every 12 hours. 74 tablet 0    bumetanide (BUMEX) 2 MG tablet Take 1 tablet by  "mouth.      Cholecalciferol (VITAMIN D3) 5000 UNITS capsule capsule Take 1 capsule by mouth Daily.      methadone (DOLOPHINE) 5 MG tablet Take 23 tablets by mouth.      mycophenolate (CELLCEPT) 500 MG tablet Take  by mouth 2 (Two) Times a Day.      PEG 3350 17 GM/SCOOP powder       Stool Softener Plus Laxative 8.6-50 MG per tablet       Syringe/Needle, Disp, 25G X 5/8\" 3 ML misc Use as directed.       No Known Allergies    Objective    Objective     Vital Signs  Temp:  [98.8 °F (37.1 °C)] 98.8 °F (37.1 °C)  Heart Rate:  [] 99  Resp:  [20] 20  BP: (124-157)/(82-83) 124/82  SpO2:  [98 %-100 %] 100 %  on   ;   Device (Oxygen Therapy): room air  Body mass index is 17.63 kg/m².    Physical Exam  Constitutional:       General: He is not in acute distress.  HENT:      Head: Normocephalic and atraumatic.   Eyes:      Extraocular Movements: Extraocular movements intact.      Pupils: Pupils are equal, round, and reactive to light.   Cardiovascular:      Rate and Rhythm: Normal rate and regular rhythm.   Pulmonary:      Effort: Pulmonary effort is normal. No respiratory distress.   Abdominal:      General: There is no distension.      Palpations: Abdomen is soft.      Tenderness: There is no abdominal tenderness.   Musculoskeletal:      Right lower leg: Edema present.      Left lower leg: Edema present.   Neurological:      Mental Status: He is alert.      Motor: Weakness present.         Results Review:  I reviewed the patient's new clinical results.  I reviewed the patient's new imaging results and agree with the interpretation.  I reviewed the patient's other test results and agree with the interpretation  I personally viewed and interpreted the patient's EKG/Telemetry data  Discussed with ED provider.    Lab Results (last 24 hours)       Procedure Component Value Units Date/Time    CBC & Differential [[id number]]  (Abnormal) Collected: 01/08/24 1627    Specimen: Blood Updated: 01/08/24 1649    Narrative:      The " following orders were created for panel order CBC & Differential.  Procedure                               Abnormality         Status                     ---------                               -----------         ------                     CBC Auto Differential[[id number]]        Abnormal            Final result                 Please view results for these tests on the individual orders.    CBC Auto Differential [[id number]]  (Abnormal) Collected: 01/08/24 1627    Specimen: Blood Updated: 01/08/24 1649     WBC 5.81 10*3/mm3      RBC 3.21 10*6/mm3      Hemoglobin 9.6 g/dL      Hematocrit 28.1 %      MCV 87.5 fL      MCH 29.9 pg      MCHC 34.2 g/dL      RDW 14.9 %      RDW-SD 47.5 fl      MPV 10.7 fL      Platelets 335 10*3/mm3      Neutrophil % 57.3 %      Lymphocyte % 31.0 %      Monocyte % 8.8 %      Eosinophil % 2.4 %      Basophil % 0.3 %      Immature Grans % 0.2 %      Neutrophils, Absolute 3.33 10*3/mm3      Lymphocytes, Absolute 1.80 10*3/mm3      Monocytes, Absolute 0.51 10*3/mm3      Eosinophils, Absolute 0.14 10*3/mm3      Basophils, Absolute 0.02 10*3/mm3      Immature Grans, Absolute 0.01 10*3/mm3      nRBC 0.0 /100 WBC     Phosphorus [[id number]]  (Normal) Collected: 01/08/24 1627    Specimen: Blood Updated: 01/08/24 1709     Phosphorus 3.7 mg/dL     Comprehensive Metabolic Panel [[id number]]  (Abnormal) Collected: 01/08/24 1720    Specimen: Blood Updated: 01/08/24 1754     Glucose 94 mg/dL      BUN 14 mg/dL      Creatinine 1.09 mg/dL      Sodium 138 mmol/L      Potassium 3.4 mmol/L      Chloride 105 mmol/L      CO2 24.9 mmol/L      Calcium 8.3 mg/dL      Total Protein 5.3 g/dL      Albumin 2.9 g/dL      ALT (SGPT) 16 U/L      AST (SGOT) 24 U/L      Alkaline Phosphatase 68 U/L      Total Bilirubin <0.2 mg/dL      Globulin 2.4 gm/dL      A/G Ratio 1.2 g/dL      BUN/Creatinine Ratio 12.8     Anion Gap 8.1 mmol/L      eGFR 86.4 mL/min/1.73     Narrative:      GFR Normal >60  Chronic Kidney Disease  <60  Kidney Failure <15      CK [[id number]]  (Abnormal) Collected: 01/08/24 1720    Specimen: Blood Updated: 01/08/24 1754     Creatine Kinase 1,089 U/L     Magnesium [[id number]]  (Normal) Collected: 01/08/24 1720    Specimen: Blood Updated: 01/08/24 1754     Magnesium 1.8 mg/dL     Ethanol [[id number]] Collected: 01/08/24 1720    Specimen: Blood Updated: 01/08/24 1754     Ethanol <10 mg/dL      Ethanol % <0.010 %             No results found for this or any previous visit.    Imaging Results (Last 24 Hours)       ** No results found for the last 24 hours. **                No orders to display              Assessment/Plan     Active Hospital Problems    Diagnosis  POA    **Rhabdomyolysis [M62.82]  Yes      Resolved Hospital Problems   No resolved problems to display.     Assessment and plan  Rhabdomyolysis  Creatinine kinase is elevated.  Trend  Continue IV fluids    Generalized weakness  He does endorse some paresthesias in his upper extremities and states that when he initially fell, he was not able to get up because of weakness in his legs.  Will ask neurology for consultation.    Chronic kidney disease  Monitor creatinine levels, currently below baseline of 1.6 at 1.09    Heroin use disorder  Continue outpatient methadone  Access consult    Congestive heart failure  Takes Bumex at home.  He is currently receiving IV fluids for rhabdomyolysis so this will be held for now.    Acute DVT-October 2023  DVT noted of the right gastrocnemius  Not on Eliquis anymore.      I discussed the patient's findings and my recommendations with patient and ED provider.    VTE Prophylaxis - SCDs.  Code Status - Full code.       Bandar Quinn MD  [facility]  01/08/24  19:45 EST

## 2024-01-09 NOTE — CONSULTS
INITIAL CONSULT NOTE      Patient Name: Ryan Marina  : 1980  MRN: [id number]  Primary Care Physician: Primitivo Link MD  Date of admission: 2024    Patient Care Team:  Primitivo Link MD as PCP - General (Internal Medicine)        Reason for Consult:       Rhabdomyolysis, C3 nephropathy    Subjective   History of Present Illness:   Chief Complaint:   Chief Complaint   Patient presents with    Generalized Body Aches     HISTORY:  Ryan Marina is a 43 y.o. male who follows our group with Dr Cam, with biopsy proven C3 nephropathy, baseline sCr 1.5-1.6 mg/dL, was supposed to be on Cellcept but has not been taking for the past few months. Other history with CHF on bumex, IVDU. Last echocardiogram 23 with EF 50%, normal diastolic function, normal RV and LV systolic function.    Patient presents to [facility] with complaints of generalized body aches, he was recently admitted to [facility] for rhabdomyolysis, MAUREEN, then left AMA on 1/3/24, also admitted to [facility]  for same, left AMA. He reports using IV heroin a few days ago, he is on methadone but has missed a few doses recently. Labs significant for CK 1,089 (improved from  when CK was 20,855), potassium 3,4, UDS positive for benzos and fentanyl. Kidney function stable with sCr 1.0, BUN 14. Nephrology consulted for rhabdomyolysis, C3 nephropathy.    Review of systems:  Weakness, hands puffy    Personal History:     Past Medical History:   Past Medical History:   Diagnosis Date    Anemia     Arthritis     CHF (congestive heart failure)     Renal disorder     Vitamin D deficiency        Surgical History:      Past Surgical History:   Procedure Laterality Date    CHOLECYSTECTOMY  10/13/2009    10/13/2009--laparoscopic cholecystectomy.    FRACTURE SURGERY      KNEE ACL RECONSTRUCTION       and --anterior cruciate ligament reconstruction right knee.       Family History: family history includes Diabetes in his maternal grandmother and  mother; Hyperthyroidism in his father. Otherwise pertinent FHx was reviewed and unremarkable.     Social History:  reports that he has been smoking cigarettes. He has been smoking an average of 1.5 packs per day. He does not have any smokeless tobacco history on file. He reports current drug use. Drug: Heroin. He reports that he does not drink alcohol.    Medications:  Prior to Admission medications    Medication Sig Start Date End Date Taking? Authorizing Provider   bumetanide (BUMEX) 2 MG tablet Take 1 tablet by mouth. 5/8/23  Yes Bita Helton MD   mycophenolate (CELLCEPT) 500 MG tablet Take  by mouth 2 (Two) Times a Day.   Yes ProviderBita MD   Apixaban Starter Pack tablet therapy pack Take two 5 mg tablets by mouth every 12 hours for 7 days. Followed by one 5 mg tablet every 12 hours. 10/19/23   Joe Patricia MD   Cholecalciferol (VITAMIN D3) 5000 UNITS capsule capsule Take 1 capsule by mouth Daily. 10/25/14   Vikram Reilly MD   methadone (DOLOPHINE) 5 MG tablet Take 24 tablets by mouth Daily.    ProviderBita MD   predniSONE (DELTASONE) 20 MG tablet Take 1 tablet by mouth Daily.    Provider, MD Bita     Scheduled Meds:methadone, 115 mg, Oral, Daily  mycophenolate, 1,000 mg, Oral, Q12H  nicotine, 1 patch, Transdermal, Q24H  senna-docusate sodium, 2 tablet, Oral, BID  sodium chloride, 10 mL, Intravenous, Q12H      Continuous Infusions:lactated ringers, 150 mL/hr, Last Rate: 150 mL/hr (01/09/24 0304)      PRN Meds:  senna-docusate sodium **AND** polyethylene glycol **AND** bisacodyl **AND** bisacodyl    nitroglycerin    sodium chloride    sodium chloride  Allergies:  No Known Allergies    Objective   Exam:     Vital Signs  Temp:  [98.6 °F (37 °C)-99.1 °F (37.3 °C)] 98.6 °F (37 °C)  Heart Rate:  [] 93  Resp:  [18-20] 18  BP: (113-157)/(47-83) 127/80  SpO2:  [98 %-100 %] 99 %  on   ;   Device (Oxygen Therapy): room air  Body mass index is 20.57 kg/m².  EXAM  General:  Ill appearing, no distress  HENT: Normocephalic, atraumatic  Cardiovascular: Normal rate and rhythm  Pulmonary: Effort normal, Lungs clear to auscultation bilatearally  Abdominal: soft, nontender  Extremities: Chronic LE and UE edema  Neuro: No focal deficits    Results Review:  I have personally reviewed most recent Data :  BMP @LABRCNTIP(creatinine:10)  CBC    Results from last 7 days   Lab Units 01/09/24  0711 01/08/24  1627 01/02/24  1432   WBC 10*3/mm3 5.49 5.81 6.20   HEMOGLOBIN g/dL 8.2* 9.6* 9.3*   PLATELETS 10*3/mm3 328 335 254     CMP   Results from last 7 days   Lab Units 01/09/24  0710 01/08/24  1720   SODIUM mmol/L 139 138   POTASSIUM mmol/L 3.9 3.4*   CHLORIDE mmol/L 106 105   CO2 mmol/L 24.0 24.9   BUN mg/dL 10 14   CREATININE mg/dL 1.08 1.09   GLUCOSE mg/dL 87 94   ALBUMIN g/dL  --  2.9*   BILIRUBIN mg/dL  --  <0.2   ALK PHOS U/L  --  68   AST (SGOT) U/L  --  24   ALT (SGPT) U/L  --  16     ABG      No radiology results for the last 7 days          Assessment & Plan   Assessment and Plan:         Rhabdomyolysis    ASSESSMENT:  Biopsy proven C3 nephropathy, CKD III; baseline sCr 1.5-1.6 mg/dL  Rhabdomyolysis  Hypokalemia  Heroine use disorder  CHF  Acute DVT  Anemia      PLAN :     Continue IVF, CK levels improving, kidney function currently at baseline  Will check urine studies  On Cellcept 1000mg po BID, continue  Counseling done for compliance  Watch for volume overload, on LR @150cc/hr  Hypokalemia, resolved, replace as needed  Volume, oxygenating on RA, BP trends acceptable, will check BNP  Daily CK level, daily BMP  Hgb low but acceptable, recommend transfusion for hgb <7.0, will check iron studies  Currently on methadone  Strict I&O's  Avoid NSAIDs  We will follow and coordinate with team    Thank you kindly for this consultation!      [facility]  1/9/2024  12:14 EST    Patient was seen earlier by  ANAMIKA.  . I have reviewed the history, data, problems,  assessment and plan with the nurse practitioner during rounds and I concur with the history, exam, assessment and plan as described in the progress note with comments additions, revisions as noted.           Radames Barroso MD  1/9/2024  [facility]

## 2024-01-09 NOTE — PROGRESS NOTES
[facility]    This patient is followed by Dr. Ed Cam of [facility]  He has C3 glomerulopathy  Will re-direct consult to Dr. Cam or partner  Thank you nonetheless for the consideration of this consult    --Butch Lowe MD

## 2024-01-10 ENCOUNTER — APPOINTMENT (OUTPATIENT)
Dept: CARDIOLOGY | Facility: HOSPITAL | Age: 44
End: 2024-01-10
Payer: MEDICARE

## 2024-01-10 ENCOUNTER — READMISSION MANAGEMENT (OUTPATIENT)
Dept: CALL CENTER | Facility: HOSPITAL | Age: 44
End: 2024-01-10
Payer: MEDICARE

## 2024-01-10 VITALS
TEMPERATURE: 97.9 F | WEIGHT: 151 LBS | HEIGHT: 72 IN | SYSTOLIC BLOOD PRESSURE: 112 MMHG | HEART RATE: 71 BPM | OXYGEN SATURATION: 99 % | BODY MASS INDEX: 20.45 KG/M2 | DIASTOLIC BLOOD PRESSURE: 66 MMHG | RESPIRATION RATE: 18 BRPM

## 2024-01-10 LAB
ANION GAP SERPL CALCULATED.3IONS-SCNC: 13.6 MMOL/L (ref 5–15)
AORTIC ARCH: 2.8 CM
AORTIC DIMENSIONLESS INDEX: 0.8 (DI)
BH CV ECHO MEAS - ACS: 1.65 CM
BH CV ECHO MEAS - AO MAX PG: 7.4 MMHG
BH CV ECHO MEAS - AO MEAN PG: 4 MMHG
BH CV ECHO MEAS - AO ROOT DIAM: 2.7 CM
BH CV ECHO MEAS - AO V2 MAX: 136 CM/SEC
BH CV ECHO MEAS - AO V2 VTI: 27.2 CM
BH CV ECHO MEAS - AVA(I,D): 2.48 CM2
BH CV ECHO MEAS - EDV(CUBED): 91.1 ML
BH CV ECHO MEAS - EDV(MOD-SP2): 110 ML
BH CV ECHO MEAS - EDV(MOD-SP4): 97 ML
BH CV ECHO MEAS - EF(MOD-BP): 55.4 %
BH CV ECHO MEAS - EF(MOD-SP2): 53.6 %
BH CV ECHO MEAS - EF(MOD-SP4): 56.7 %
BH CV ECHO MEAS - ESV(CUBED): 35.8 ML
BH CV ECHO MEAS - ESV(MOD-SP2): 51 ML
BH CV ECHO MEAS - ESV(MOD-SP4): 42 ML
BH CV ECHO MEAS - FS: 26.7 %
BH CV ECHO MEAS - IVS/LVPW: 1 CM
BH CV ECHO MEAS - IVSD: 0.8 CM
BH CV ECHO MEAS - LAT PEAK E' VEL: 22.4 CM/SEC
BH CV ECHO MEAS - LV MASS(C)D: 113.6 GRAMS
BH CV ECHO MEAS - LV MAX PG: 4.7 MMHG
BH CV ECHO MEAS - LV MEAN PG: 2 MMHG
BH CV ECHO MEAS - LV V1 MAX: 108 CM/SEC
BH CV ECHO MEAS - LV V1 VTI: 21.6 CM
BH CV ECHO MEAS - LVIDD: 4.5 CM
BH CV ECHO MEAS - LVIDS: 3.3 CM
BH CV ECHO MEAS - LVOT AREA: 3.1 CM2
BH CV ECHO MEAS - LVOT DIAM: 2 CM
BH CV ECHO MEAS - LVPWD: 0.8 CM
BH CV ECHO MEAS - MED PEAK E' VEL: 12.7 CM/SEC
BH CV ECHO MEAS - MV A DUR: 0.11 SEC
BH CV ECHO MEAS - MV A MAX VEL: 53.1 CM/SEC
BH CV ECHO MEAS - MV DEC SLOPE: 357.3 CM/SEC2
BH CV ECHO MEAS - MV DEC TIME: 0.21 SEC
BH CV ECHO MEAS - MV E MAX VEL: 65.6 CM/SEC
BH CV ECHO MEAS - MV E/A: 1.24
BH CV ECHO MEAS - MV MAX PG: 1.99 MMHG
BH CV ECHO MEAS - MV MEAN PG: 1.01 MMHG
BH CV ECHO MEAS - MV P1/2T: 60.3 MSEC
BH CV ECHO MEAS - MV V2 VTI: 18.9 CM
BH CV ECHO MEAS - MVA(P1/2T): 3.6 CM2
BH CV ECHO MEAS - MVA(VTI): 3.6 CM2
BH CV ECHO MEAS - PA ACC TIME: 0.14 SEC
BH CV ECHO MEAS - PA V2 MAX: 92.1 CM/SEC
BH CV ECHO MEAS - PULM A REVS DUR: 0.1 SEC
BH CV ECHO MEAS - PULM A REVS VEL: 37 CM/SEC
BH CV ECHO MEAS - PULM DIAS VEL: 49.9 CM/SEC
BH CV ECHO MEAS - PULM S/D: 1.4
BH CV ECHO MEAS - PULM SYS VEL: 69.9 CM/SEC
BH CV ECHO MEAS - RAP SYSTOLE: 3 MMHG
BH CV ECHO MEAS - RV MAX PG: 2.21 MMHG
BH CV ECHO MEAS - RV V1 MAX: 74.4 CM/SEC
BH CV ECHO MEAS - RV V1 VTI: 18.2 CM
BH CV ECHO MEAS - RVSP: 13.6 MMHG
BH CV ECHO MEAS - SUP REN AO DIAM: 1.6 CM
BH CV ECHO MEAS - SV(LVOT): 67.6 ML
BH CV ECHO MEAS - SV(MOD-SP2): 59 ML
BH CV ECHO MEAS - SV(MOD-SP4): 55 ML
BH CV ECHO MEAS - TAPSE (>1.6): 1.82 CM
BH CV ECHO MEAS - TR MAX PG: 10.6 MMHG
BH CV ECHO MEAS - TR MAX VEL: 162.9 CM/SEC
BH CV ECHO MEASUREMENTS AVERAGE E/E' RATIO: 3.74
BH CV XLRA - RV BASE: 3.5 CM
BH CV XLRA - RV LENGTH: 7.5 CM
BH CV XLRA - RV MID: 2.47 CM
BH CV XLRA - TDI S': 13.8 CM/SEC
BUN SERPL-MCNC: 7 MG/DL (ref 6–20)
BUN/CREAT SERPL: 7.4 (ref 7–25)
CALCIUM SPEC-SCNC: 8.4 MG/DL (ref 8.6–10.5)
CHLORIDE SERPL-SCNC: 107 MMOL/L (ref 98–107)
CK SERPL-CCNC: 456 U/L (ref 20–200)
CO2 SERPL-SCNC: 18.4 MMOL/L (ref 22–29)
CREAT SERPL-MCNC: 0.95 MG/DL (ref 0.76–1.27)
EGFRCR SERPLBLD CKD-EPI 2021: 101.9 ML/MIN/1.73
GLUCOSE SERPL-MCNC: 87 MG/DL (ref 65–99)
LEFT ATRIUM VOLUME INDEX: 21.3 ML/M2
POTASSIUM SERPL-SCNC: 3.9 MMOL/L (ref 3.5–5.2)
SINUS: 2.7 CM
SODIUM SERPL-SCNC: 139 MMOL/L (ref 136–145)
STJ: 2.6 CM
VIT B12 BLD-MCNC: 264 PG/ML (ref 211–946)

## 2024-01-10 PROCEDURE — 63710000001 MYCOPHENOLATE MOFETIL PER 250 MG: Performed by: STUDENT IN AN ORGANIZED HEALTH CARE EDUCATION/TRAINING PROGRAM

## 2024-01-10 PROCEDURE — 82550 ASSAY OF CK (CPK): CPT | Performed by: PSYCHIATRY & NEUROLOGY

## 2024-01-10 PROCEDURE — 93306 TTE W/DOPPLER COMPLETE: CPT

## 2024-01-10 PROCEDURE — 80048 BASIC METABOLIC PNL TOTAL CA: CPT

## 2024-01-10 PROCEDURE — 25010000002 CYANOCOBALAMIN PER 1000 MCG: Performed by: NURSE PRACTITIONER

## 2024-01-10 PROCEDURE — 99233 SBSQ HOSP IP/OBS HIGH 50: CPT | Performed by: NURSE PRACTITIONER

## 2024-01-10 PROCEDURE — 86235 NUCLEAR ANTIGEN ANTIBODY: CPT | Performed by: PSYCHIATRY & NEUROLOGY

## 2024-01-10 PROCEDURE — 93306 TTE W/DOPPLER COMPLETE: CPT | Performed by: INTERNAL MEDICINE

## 2024-01-10 RX ORDER — CYANOCOBALAMIN 1000 UG/ML
1000 INJECTION, SOLUTION INTRAMUSCULAR; SUBCUTANEOUS DAILY
Status: DISCONTINUED | OUTPATIENT
Start: 2024-01-10 | End: 2024-01-10 | Stop reason: HOSPADM

## 2024-01-10 RX ADMIN — CYANOCOBALAMIN 1000 MCG: 1000 INJECTION, SOLUTION INTRAMUSCULAR at 13:05

## 2024-01-10 RX ADMIN — MYCOPHENOLATE MOFETIL 1000 MG: 500 TABLET ORAL at 09:40

## 2024-01-10 RX ADMIN — Medication 10 ML: at 09:41

## 2024-01-10 RX ADMIN — NICOTINE 1 PATCH: 21 PATCH, EXTENDED RELEASE TRANSDERMAL at 09:40

## 2024-01-10 RX ADMIN — METHADONE HYDROCHLORIDE 115 MG: 10 TABLET ORAL at 11:38

## 2024-01-10 NOTE — PROGRESS NOTES
"DOS: 1/10/2024  NAME: Ryan Marina   : 1980  PCP: Primitivo Link MD    Chief Complaint   Patient presents with    Generalized Body Aches         Subjective: Pt seen in follow up, however the problem is new to me.  Patient lying in bed states his legs are still weak and about the same.  Denies any new weakness, numbness, speech or visual disturbances, or headaches.  No family at bedside.    Objective:  Vital signs:   Vitals:    24 1940 24 2331 01/10/24 0722 01/10/24 1058   BP: 118/69 127/78 112/66 112/66   BP Location: Left arm Left arm Right arm    Patient Position: Lying Lying Lying    Pulse: 83 80 71 71   Resp: 18 18     Temp: 98.8 °F (37.1 °C) 98.8 °F (37.1 °C) 97.9 °F (36.6 °C)    TempSrc: Oral Oral Oral    SpO2: 100% 96% 99%    Weight:    68.5 kg (151 lb)   Height:    182.9 cm (72\")       Current Facility-Administered Medications:     sennosides-docusate (PERICOLACE) 8.6-50 MG per tablet 2 tablet, 2 tablet, Oral, BID **AND** polyethylene glycol (MIRALAX) packet 17 g, 17 g, Oral, Daily PRN **AND** bisacodyl (DULCOLAX) EC tablet 5 mg, 5 mg, Oral, Daily PRN **AND** bisacodyl (DULCOLAX) suppository 10 mg, 10 mg, Rectal, Daily PRN, Bandar Quinn MD    melatonin tablet 3 mg, 3 mg, Oral, Nightly PRN, Edie Stubbs, ANAMIKA    methadone (DOLOPHINE) tablet 115 mg, 115 mg, Oral, Once, Bandar Quinn MD    mycophenolate (CELLCEPT) tablet 1,000 mg, 1,000 mg, Oral, Q12H, Bandar Quinn MD, 1,000 mg at 01/10/24 0940    nicotine (NICODERM CQ) 21 MG/24HR patch 1 patch, 1 patch, Transdermal, Q24H, Ambrose Liu MD, 1 patch at 01/10/24 0940    nitroglycerin (NITROSTAT) SL tablet 0.4 mg, 0.4 mg, Sublingual, Q5 Min PRN, Bandar Quinn MD    sodium chloride 0.9 % flush 10 mL, 10 mL, Intravenous, Q12H, Bandar Quinn MD, 10 mL at 01/10/24 0941    sodium chloride 0.9 % flush 10 mL, 10 mL, Intravenous, PRN, Bandar Quinn MD    sodium chloride 0.9 % infusion 40 mL, 40 mL, Intravenous, PRN, Kai, " "MD Bandar    PRN meds    senna-docusate sodium **AND** polyethylene glycol **AND** bisacodyl **AND** bisacodyl    melatonin    nitroglycerin    sodium chloride    sodium chloride    No current facility-administered medications on file prior to encounter.     Current Outpatient Medications on File Prior to Encounter   Medication Sig    bumetanide (BUMEX) 2 MG tablet Take 1 tablet by mouth.    mycophenolate (CELLCEPT) 500 MG tablet Take  by mouth 2 (Two) Times a Day.    Apixaban Starter Pack tablet therapy pack Take two 5 mg tablets by mouth every 12 hours for 7 days. Followed by one 5 mg tablet every 12 hours.    Cholecalciferol (VITAMIN D3) 5000 UNITS capsule capsule Take 1 capsule by mouth Daily.    methadone (DOLOPHINE) 5 MG tablet Take 24 tablets by mouth Daily.    predniSONE (DELTASONE) 20 MG tablet Take 1 tablet by mouth Daily.       General appearance: Appears older than stated age, poor dentition, NAD, alert and cooperative  HEENT: Normocephalic, atraumatic  Resp: Even and unlabored    Neurological:   MS: oriented x3, recent/remote memory intact, normal attention/concentration, language intact, no neglect, normal fund of knowledge  CN: visual fields full, EOMI, facial sensation equal, no facial droop, hearing symmetric, palate elevates symmetrically, tongue midline  Motor: 5/5 upper extremities, 4/5 RLE, 3/5 LLE  Reflexes: 2+ lower extremities, 1+ upper extremities, clonus of both feet  Sensory: Absent cold temperature sensation of all 4 extremities, normal vibratory and light touch sensation intact in all 4 ext.  Coordination: Normal finger to nose test  Gait and station: Deferred  Rapid alternating movements: normal finger to thumb tap    Laboratory results:  Lab Results   Component Value Date    TSH 2.470 09/21/2020     Lab Results   Component Value Date    ZNGVDEXS43 251 06/30/2023     No results found for: \"HGBA1C\"  Lab Results   Component Value Date    GLUCOSE 87 01/09/2024    BUN 10 01/09/2024    " "CREATININE 1.08 01/09/2024    EGFRIFAFRI 51 (L) 01/22/2023    BCR 9.3 01/09/2024    K 3.9 01/09/2024    CO2 24.0 01/09/2024    CALCIUM 7.9 (L) 01/09/2024    ALBUMIN 2.9 (L) 01/08/2024    LABIL2 0.9 (L) 01/22/2023    AST 24 01/08/2024    ALT 16 01/08/2024     Lab Results   Component Value Date    WBC 5.49 01/09/2024    HGB 8.2 (L) 01/09/2024    HCT 24.9 (L) 01/09/2024    MCV 88.0 01/09/2024     01/09/2024     Brief Urine Lab Results  (Last result in the past 365 days)        Color   Clarity   Blood   Leuk Est   Nitrite   Protein   CREAT   Urine HCG        01/09/24 0645             91.0               No results found for: \"ACANTHNAEG\", \"AFBCX\", \"BPERTUSSISCX\", \"BLOODCX\"  No results found for: \"BCIDPCR\", \"CXREFLEX\", \"CSFCX\", \"CULTURETIS\"  No results found for: \"CULTURES\", \"HSVCX\", \"URCX\"  No results found for: \"EYECULTURE\", \"GCCX\", \"HSVCULTURE\", \"LABHSV\"  No results found for: \"LEGIONELLA\", \"MRSACX\", \"MUMPSCX\", \"MYCOPLASCX\"  No results found for: \"NOCARDIACX\", \"STOOLCX\"  No results found for: \"THROATCX\", \"UNSTIMCULT\", \"URINECX\", \"CULTURE\", \"VZVCULTUR\"  No results found for: \"VIRALCULTU\", \"WOUNDCX\"  Pain Management Panel  More data exists         Latest Ref Rng & Units 1/9/2024 9/17/2023   Pain Management Panel   Creatinine, Urine mg/dL 91.0  -   Barbiturates Screen, Urine Negative Negative  -   Benzodiazepine Screen, Urine Negative Positive  SCREEN POSITIVE       Cocaine Screen, Urine Negative Negative  -   Fentanyl, Urine Negative Positive  -   Methadone Screen , Urine Negative Negative  -      Details          This result is from an external source.               Review and interpretation of imaging:  No radiology results for the last 7 days      Impression/Assessment:  This is a 43-year-old male with past medical history of polysubstance abuse on methadone PTA, CHF who presented to the hospital 1/8/2024 with complaints of generalized weakness, severe muscle pain, and recurrent falls.  He states he has been " dealing with the same symptoms for several years.  Most recently he was found to have a CK level of 41,000 about a week ago and had been treated for rhabdomyolysis.  He states he has had history of elevated CKs in the past.  His CK has since improved to 456 today.  He states he had an EMG/NCS about a year ago due to similar symptoms and was told he had neuropathy from B12 deficiency and was instructed to start taking IM injections due to inability to absorb p.o. form.  His last B12 in June 2023 was 251.  He states he has not been taking the replacement in quite some time.  He also reports a history of degenerative disc disease that has reportedly been getting worse.  His last spinal imaging that I can see in the care everywhere tab was performed at [facility] in September 2023.  Per review of the report he had moderate degenerative changes of the atlantoaxial joint otherwise the thoracic and lumbar findings were mild.  I also reviewed his prior EMG/NCS that was performed in November 2022 at [facility] [place] by Dr. Flores.  Per report he had normal nerve conduction responses and needle exams and there was no evidence of a peripheral polyneuropathy or lumbosacral radiculopathy of either side.  I also reviewed a recent CT head performed at [facility] on 12/31 that per report revealed no acute intracranial abnormalities.    Diagnosis:  Rhabdomyolysis  Generalized weakness, proximal pattern  Polysubstance abuse  CKD/glomerular disease    Plan:  -CK continuing to improve, 456 today.  He feels the muscle pain and weakness are about the same.  -Reviewed EMG/NCS from 11/2022 at [facility] of lower extremities.  Reportedly this was normal per report findings.  -Discussed with Dr. Gallardo, no need to repeat EMG/NCS at this time.  -Will check B12 and folate.  -Continue supportive care for rhabdomyolysis  -Anti-Jo1 antibody pending  -Access center to see for polysubstance abuse.  We will sign off, will see again per request    Case discussed with  patient and Dr. Gallardo, and he agrees with plan above.     ANAMIKA Kapadia

## 2024-01-10 NOTE — CASE MANAGEMENT/SOCIAL WORK
Case Management Discharge Note      Final Note: Home, friend to transport home         Selected Continued Care - Discharged on 1/10/2024 Admission date: 1/8/2024 - Discharge disposition: Home or Self Care      Destination    No services have been selected for the patient.                Durable Medical Equipment    No services have been selected for the patient.                Dialysis/Infusion    No services have been selected for the patient.                Home Medical Care    No services have been selected for the patient.                Therapy    No services have been selected for the patient.                Community Resources    No services have been selected for the patient.                Community & DME    No services have been selected for the patient.                    Transportation Services  Private: Car    Final Discharge Disposition Code: 01 - home or self-care

## 2024-01-10 NOTE — DISCHARGE SUMMARY
Patient Name: Ryan Marina  : 1980  MRN: [id number]    Date of Admission: 2024  Date of Discharge:  1/10/2024  Primary Care Physician: Primitivo Link MD      Chief Complaint:   Generalized Body Aches      Discharge Diagnoses     Active Hospital Problems    Diagnosis  POA    **Rhabdomyolysis [M62.82]  Yes      Resolved Hospital Problems   No resolved problems to display.        Hospital Course       This is a 43-year-old man with a C3 glomerular nephropathy as well as chronic kidney disease, IV drug abuse most recently on methadone treatment who presented to hospital after experiencing body wide pain.  H was admitted at  for rhabdomyolysis and acute renal failure.  At that time after being treated with IV fluids, and being monitored for gradual decline of creatinine kinase levels, he left AGAINST MEDICAL ADVICE.  He reportedly used IV heroin just a few days ago.  After more investigation, he was discharged from his methadone clinic and missed some doses of methadone due to being arrested which led to the relapse.  He is CK continues to downtrend, he was initially endorsing weakness but has been seen multiple times walking around the nurses station without any issues.  From a medical standpoint, his labs are stable.  He is subsequently being discharged in an improved condition.    Further management of his renal issue should be provided by his primary nephrologist in the outpatient setting.    Temp:  [97.9 °F (36.6 °C)-99.3 °F (37.4 °C)] 97.9 °F (36.6 °C)  Heart Rate:  [71-90] 71  Resp:  [18] 18  BP: (112-143)/(66-99) 112/66  Body mass index is 20.48 kg/m².  Physical Exam  Constitutional:       General: He is not in acute distress.  HENT:      Head: Normocephalic and atraumatic.   Cardiovascular:      Rate and Rhythm: Normal rate and regular rhythm.   Pulmonary:      Effort: Pulmonary effort is normal. No respiratory distress.   Abdominal:      General: There is no distension.       Palpations: Abdomen is soft.      Tenderness: There is no abdominal tenderness.   Musculoskeletal:      Right lower leg: Edema present.      Left lower leg: Edema present.   Neurological:      General: No focal deficit present.      Mental Status: He is alert and oriented to person, place, and time.         Consultants     Consult Orders (all) (From admission, onward)       Start     Ordered    01/09/24 1145  Inpatient Nephrology Consult  Once        Specialty:  Nephrology  Provider:  Radames Barroso MD    01/09/24 1145    01/09/24 1017  Inpatient Nephrology Consult  Once        Specialty:  Nephrology  Provider:  Edvin Jimenez MD    01/09/24 1016    01/08/24 2215  Inpatient Case Management  Consult  Once        Provider:  (Not yet assigned)    01/08/24 2215 01/08/24 2112  Inpatient Neurology Consult General  Once        Specialty:  Neurology  Provider:  Fabian Gallardo MD    01/08/24 2112 01/08/24 1825  [facility] (on-call MD unless specified) Details  Once        Specialty:  Hospitalist  Provider:  (Not yet assigned)    01/08/24 1824                  Procedures     Imaging Results (All)       None            Pertinent Labs     Results from last 7 days   Lab Units 01/09/24  0711 01/08/24  1627   WBC 10*3/mm3 5.49 5.81   HEMOGLOBIN g/dL 8.2* 9.6*   PLATELETS 10*3/mm3 328 335     Results from last 7 days   Lab Units 01/09/24  0710 01/08/24  1720   SODIUM mmol/L 139 138   POTASSIUM mmol/L 3.9 3.4*   CHLORIDE mmol/L 106 105   CO2 mmol/L 24.0 24.9   BUN mg/dL 10 14   CREATININE mg/dL 1.08 1.09   GLUCOSE mg/dL 87 94   Estimated Creatinine Clearance: 85.4 mL/min (by C-G formula based on SCr of 1.08 mg/dL).  Results from last 7 days   Lab Units 01/08/24  1720   ALBUMIN g/dL 2.9*   BILIRUBIN mg/dL <0.2   ALK PHOS U/L 68   AST (SGOT) U/L 24   ALT (SGPT) U/L 16     Results from last 7 days   Lab Units 01/09/24  0710 01/08/24  1720 01/08/24  1627   CALCIUM mg/dL 7.9* 8.3*  --    ALBUMIN g/dL  --   "2.9*  --    MAGNESIUM mg/dL  --  1.8  --    PHOSPHORUS mg/dL  --   --  3.7       Results from last 7 days   Lab Units 01/10/24  0728 01/09/24  0710 01/08/24  1720   CK TOTAL U/L 456* 760* 1,089*   PROBNP pg/mL  --  511.0*  --      Results from last 7 days   Lab Units 01/09/24  0645   CREATININE UR mg/dL 91.0   PROTEIN TOTAL URINE mg/dL 12.9   PROT/CREAT RATIO UR mg/G Crea 141.8         Invalid input(s): \"LDLCALC\"        Test Results Pending at Discharge     Pending Labs       Order Current Status    Anti-Kristin 1 Antibody, IgG In process    Basic Metabolic Panel In process    Folate In process            Discharge Details        Discharge Medications        Continue These Medications        Instructions Start Date   bumetanide 2 MG tablet  Commonly known as: BUMEX   2 mg, Oral      methadone 5 MG tablet  Commonly known as: DOLOPHINE   120 mg, Oral, Daily      mycophenolate 500 MG tablet  Commonly known as: CELLCEPT   Oral, 2 Times Daily      predniSONE 20 MG tablet  Commonly known as: DELTASONE   20 mg, Oral, Daily      vitamin D3 125 MCG (5000 UT) capsule capsule   1 capsule, Oral, Daily             Stop These Medications      Eliquis DVT/PE Starter Pack tablet therapy pack  Generic drug: Apixaban Starter Pack              No Known Allergies      Discharge Disposition:  Home or Self Care    Discharge Diet:  Diet Order   Procedures    Diet: Regular/House Diet; Texture: Regular Texture (IDDSI 7); Fluid Consistency: Thin (IDDSI 0)       Discharge Activity: as tolerated       CODE STATUS:    Code Status and Medical Interventions:   Ordered at: 01/08/24 1940     Code Status (Patient has no pulse and is not breathing):    CPR (Attempt to Resuscitate)     Medical Interventions (Patient has pulse or is breathing):    Full Support       No future appointments.   Follow-up Information       Primitivo Link MD .    Specialty: Internal Medicine  Contact information:  [address] " [address]  [phone]                             Time Spent on Discharge:  Greater than 30 minutes      Bandar Quinn MD  [facility]  01/10/24  12:55 EST

## 2024-01-10 NOTE — PROGRESS NOTES
PROGRESS NOTE      Patient Name: Ryan Marina  : 1980  MRN: [id number]  Primary Care Physician: Primitivo Link MD  Date of admission: 2024    Patient Care Team:  Primitivo Link MD as PCP - General (Internal Medicine)        Reason for Follow up:     Rhabdomyolysis, C3 nephropathy    Subjective   Subjective:   Chief Complaint:   Chief Complaint   Patient presents with    Generalized Body Aches     Seen and examined, no distress  CK trending down, no BMP today, will order, kidney function stable yesterday    Review of systems:  Weakness, hands puffy    Personal History:     Past Medical History:   Past Medical History:   Diagnosis Date    Anemia     Arthritis     CHF (congestive heart failure)     Renal disorder     Vitamin D deficiency        Surgical History:      Past Surgical History:   Procedure Laterality Date    CHOLECYSTECTOMY  10/13/2009    10/13/2009--laparoscopic cholecystectomy.    FRACTURE SURGERY      KNEE ACL RECONSTRUCTION       and --anterior cruciate ligament reconstruction right knee.       Family History: family history includes Diabetes in his maternal grandmother and mother; Hyperthyroidism in his father. Otherwise pertinent FHx was reviewed and unremarkable.     Social History:  reports that he has been smoking cigarettes. He has been smoking an average of 1.5 packs per day. He does not have any smokeless tobacco history on file. He reports current drug use. Drug: Heroin. He reports that he does not drink alcohol.    Medications:  Prior to Admission medications    Medication Sig Start Date End Date Taking? Authorizing Provider   bumetanide (BUMEX) 2 MG tablet Take 1 tablet by mouth. 23  Yes ProviderBita MD   mycophenolate (CELLCEPT) 500 MG tablet Take  by mouth 2 (Two) Times a Day.   Yes Provider, MD Bita   Apixaban Starter Pack tablet therapy pack Take two 5 mg tablets by mouth every 12 hours for 7 days. Followed by one 5 mg tablet every 12  hours. 10/19/23   Joe Patricia MD   Cholecalciferol (VITAMIN D3) 5000 UNITS capsule capsule Take 1 capsule by mouth Daily. 10/25/14   Vikram Reilly MD   methadone (DOLOPHINE) 5 MG tablet Take 24 tablets by mouth Daily.    Provider, MD Bita   predniSONE (DELTASONE) 20 MG tablet Take 1 tablet by mouth Daily.    Provider, MD Bita     Scheduled Meds:mycophenolate, 1,000 mg, Oral, Q12H  nicotine, 1 patch, Transdermal, Q24H  senna-docusate sodium, 2 tablet, Oral, BID  sodium chloride, 10 mL, Intravenous, Q12H      Continuous Infusions:     PRN Meds:  senna-docusate sodium **AND** polyethylene glycol **AND** bisacodyl **AND** bisacodyl    melatonin    nitroglycerin    sodium chloride    sodium chloride  Allergies:  No Known Allergies    Objective   Exam:     Vital Signs  Temp:  [97.9 °F (36.6 °C)-99.3 °F (37.4 °C)] 97.9 °F (36.6 °C)  Heart Rate:  [71-90] 71  Resp:  [18] 18  BP: (112-143)/(66-99) 112/66  SpO2:  [96 %-100 %] 99 %  on   ;   Device (Oxygen Therapy): room air  Body mass index is 20.48 kg/m².  EXAM  General: Ill appearing, no distress  HENT: Normocephalic, atraumatic  Cardiovascular: Normal rate and rhythm  Pulmonary: Effort normal, Lungs clear to auscultation bilatearally  Abdominal: soft, nontender  Extremities: Chronic LE and UE edema  Neuro: No focal deficits    Results Review:  I have personally reviewed most recent Data :  BMP @LABRCNT(creatinine:10)  CBC    Results from last 7 days   Lab Units 01/09/24  0711 01/08/24  1627   WBC 10*3/mm3 5.49 5.81   HEMOGLOBIN g/dL 8.2* 9.6*   PLATELETS 10*3/mm3 328 335     CMP   Results from last 7 days   Lab Units 01/09/24  0710 01/08/24  1720   SODIUM mmol/L 139 138   POTASSIUM mmol/L 3.9 3.4*   CHLORIDE mmol/L 106 105   CO2 mmol/L 24.0 24.9   BUN mg/dL 10 14   CREATININE mg/dL 1.08 1.09   GLUCOSE mg/dL 87 94   ALBUMIN g/dL  --  2.9*   BILIRUBIN mg/dL  --  <0.2   ALK PHOS U/L  --  68   AST (SGOT) U/L  --  24   ALT (SGPT) U/L  --  16     ABG       No radiology results for the last 7 days    Results for orders placed during the hospital encounter of 01/08/24    Adult Transthoracic Echo Complete W/ Cont if Necessary Per Protocol    Interpretation Summary    Left ventricular systolic function is normal. Calculated left ventricular EF = 55.4%    Left ventricular diastolic function was normal.        Assessment & Plan   Assessment and Plan:         Rhabdomyolysis    ASSESSMENT:  Biopsy proven C3 nephropathy, CKD III; baseline sCr 1.5-1.6 mg/dL  Rhabdomyolysis  Hypokalemia  Heroine use disorder  CHF  Acute DVT  Anemia      PLAN :     CK levels improving, kidney function currently at baseline  Will check urine studies, UA not done yet, PCR 141mg/g  On Cellcept 1000mg po BID, continue  Counseling done for compliance  Now off IVF  Hypokalemia, resolved, replace as needed  Volume, oxygenating on RA, BP trends acceptable, proBNP 511  Daily CK level, daily BMP  Hgb low but acceptable, recommend transfusion for hgb <7.0, iron studies reviewed, may need supplementation outpatient  Currently on methadone  Strict I&O's  Avoid NSAIDs  We will follow and coordinate with team  Follow up labs today    Note transcribed for Dr Tammy Martinez, ANAMIKA  [facility]  1/10/2024  12:32 EST    Patient was seen earlier by  APRN.  . I have reviewed the history, data, problems, assessment and plan with the nurse practitioner during rounds and I concur with the history, exam, assessment and plan as described in the progress note with comments additions, revisions as noted.           Radames Barroso MD  1/10/2024  [facility]

## 2024-01-10 NOTE — PLAN OF CARE
Goal Outcome Evaluation:  Plan of Care Reviewed With: patient        Progress: no change  Outcome Evaluation: Maintained on R/A . BRP with assist, weak and slow gait, tolerated fair. Pt requested Trazadone at HS as he states he takes this at home. Melatonin PRN ordered by ORLANDO WILLSON. Pt later refused, stated he did not need. it. Pt rested well during shift. No c/o's voiced. Safety maintained.

## 2024-01-11 LAB — ENA JO1 AB SER-ACNC: <0.2 AI (ref 0–0.9)

## 2024-01-11 NOTE — OUTREACH NOTE
Prep Survey      Flowsheet Row Responses   [facility] facility patient discharged from? [facility]   Is LACE score < 7 ? No   Eligibility Readm Mgmt   Discharge diagnosis Rhabdomyolysis   Does the patient have one of the following disease processes/diagnoses(primary or secondary)? Other   Does the patient have Home health ordered? No   Is there a DME ordered? No   Prep survey completed? Yes            DEBRA DIAZ - Registered Nurse

## 2024-01-16 ENCOUNTER — READMISSION MANAGEMENT (OUTPATIENT)
Dept: CALL CENTER | Facility: HOSPITAL | Age: 44
End: 2024-01-16
Payer: MEDICARE

## 2024-01-16 NOTE — OUTREACH NOTE
Medical Week 1 Survey      Flowsheet Row Responses   [facility] patient discharged from? [facility]   Does the patient have one of the following disease processes/diagnoses(primary or secondary)? Other   Week 1 attempt successful? No   Unsuccessful attempts Attempt 1            Gemma ECHEVERRIA - Registered Nurse

## 2024-01-19 ENCOUNTER — READMISSION MANAGEMENT (OUTPATIENT)
Dept: CALL CENTER | Facility: HOSPITAL | Age: 44
End: 2024-01-19
Payer: MEDICARE

## 2024-01-19 NOTE — OUTREACH NOTE
Medical Week 1 Survey      Flowsheet Row Responses   [facility] patient discharged from? [facility]   Does the patient have one of the following disease processes/diagnoses(primary or secondary)? Other   Week 1 attempt successful? Yes   Call start time 0904   Call end time 0906   Discharge diagnosis Rhabdomyolysis   Person spoke with today (if not patient) and relationship Father   Meds reviewed with patient/caregiver? Yes   Is the patient having any side effects they believe may be caused by any medication additions or changes? No   Does the patient have all medications ordered at discharge? Yes   Is the patient taking all medications as directed (includes completed medication regime)? Yes   Does the patient have a primary care provider?  Yes   Does the patient have an appointment with their PCP within 7 days of discharge? No   Nursing Interventions Educated patient on importance of making appointment, Advised patient to make appointment   Has the patient kept scheduled appointments due by today? N/A   Psychosocial issues? No   Did the patient receive a copy of their discharge instructions? Yes   Nursing interventions Reviewed instructions with patient   What is the patient's perception of their health status since discharge? Improving   Is the patient/caregiver able to teach back signs and symptoms related to disease process for when to call PCP? Yes   Is the patient/caregiver able to teach back signs and symptoms related to disease process for when to call 911? Yes   Is the patient/caregiver able to teach back the hierarchy of who to call/visit for symptoms/problems? PCP, Specialist, Home health nurse, Urgent Care, ED, 911 Yes   If the patient is a current smoker, are they able to teach back resources for cessation? Not a smoker   Week 1 call completed? Yes   Graduated Yes   Did the patient feel the follow up calls were helpful during their recovery period? Yes   Was the number of calls appropriate? Yes    Does the patient have an Advance Directive or Living Will? No   Is the patient/caregiver familiar with Advance Care Planning? No   Would the patient like more information on Advance Care Planning? No   Would this patient benefit from a Referral to [facility] Social Work? No   Is the patient interested in additional calls from an ambulatory ? No   Graduated/Revoked comments Father states pt was not there and doing well. Advised for pt to make a PCP fu appt. No questions/concerns.   Wrap up additional comments Father states pt was not there and doing well. Advised for pt to make a PCP fu appt. No questions/concerns.   Call end time 0906            Dipika BOSCH - Registered Nurse

## 2024-04-11 ENCOUNTER — HOSPITAL ENCOUNTER (INPATIENT)
Facility: HOSPITAL | Age: 44
LOS: 2 days | Discharge: HOME | End: 2024-04-16
Attending: EMERGENCY MEDICINE | Admitting: INTERNAL MEDICINE
Payer: MEDICARE

## 2024-04-11 DIAGNOSIS — L03.114 CELLULITIS OF LEFT ARM: Primary | ICD-10-CM

## 2024-04-11 DIAGNOSIS — M62.82 NON-TRAUMATIC RHABDOMYOLYSIS: ICD-10-CM

## 2024-04-11 DIAGNOSIS — D64.9 CHRONIC ANEMIA: ICD-10-CM

## 2024-04-11 DIAGNOSIS — F11.20 NARCOTIC DEPENDENCE: ICD-10-CM

## 2024-04-11 DIAGNOSIS — L03.113 CELLULITIS OF RIGHT ARM: ICD-10-CM

## 2024-04-11 DIAGNOSIS — F19.10 IV DRUG ABUSE: ICD-10-CM

## 2024-04-11 PROBLEM — L03.119 CELLULITIS OF ARM: Status: ACTIVE | Noted: 2024-04-11

## 2024-04-11 LAB
ALBUMIN SERPL-MCNC: 3.5 G/DL (ref 3.5–5.2)
ALBUMIN/GLOB SERPL: 1.3 G/DL
ALP SERPL-CCNC: 129 U/L (ref 39–117)
ALT SERPL W P-5'-P-CCNC: 25 U/L (ref 1–41)
ANION GAP SERPL CALCULATED.3IONS-SCNC: 9 MMOL/L (ref 5–15)
APAP SERPL-MCNC: <5 MCG/ML (ref 0–30)
APTT PPP: 29.8 SECONDS (ref 22.7–35.4)
AST SERPL-CCNC: 64 U/L (ref 1–40)
BASOPHILS # BLD AUTO: 0.01 10*3/MM3 (ref 0–0.2)
BASOPHILS NFR BLD AUTO: 0.2 % (ref 0–1.5)
BILIRUB SERPL-MCNC: 0.4 MG/DL (ref 0–1.2)
BUN SERPL-MCNC: 11 MG/DL (ref 6–20)
BUN/CREAT SERPL: 9.4 (ref 7–25)
CALCIUM SPEC-SCNC: 8.3 MG/DL (ref 8.6–10.5)
CHLORIDE SERPL-SCNC: 106 MMOL/L (ref 98–107)
CK SERPL-CCNC: 621 U/L (ref 20–200)
CO2 SERPL-SCNC: 24 MMOL/L (ref 22–29)
CREAT SERPL-MCNC: 1.17 MG/DL (ref 0.76–1.27)
D-LACTATE SERPL-SCNC: 1.4 MMOL/L (ref 0.5–2)
DEPRECATED RDW RBC AUTO: 43.7 FL (ref 37–54)
EGFRCR SERPLBLD CKD-EPI 2021: 79.3 ML/MIN/1.73
EOSINOPHIL # BLD AUTO: 0.05 10*3/MM3 (ref 0–0.4)
EOSINOPHIL NFR BLD AUTO: 1 % (ref 0.3–6.2)
ERYTHROCYTE [DISTWIDTH] IN BLOOD BY AUTOMATED COUNT: 13.8 % (ref 12.3–15.4)
ETHANOL BLD-MCNC: <10 MG/DL (ref 0–10)
ETHANOL UR QL: <0.01 %
FLUAV RNA RESP QL NAA+PROBE: NOT DETECTED
FLUBV RNA RESP QL NAA+PROBE: NOT DETECTED
GLOBULIN UR ELPH-MCNC: 2.6 GM/DL
GLUCOSE SERPL-MCNC: 133 MG/DL (ref 65–99)
HAV IGM SERPL QL IA: NORMAL
HBV CORE IGM SERPL QL IA: NORMAL
HBV SURFACE AG SERPL QL IA: NORMAL
HCT VFR BLD AUTO: 27.7 % (ref 37.5–51)
HCV AB SER DONR QL: NORMAL
HGB BLD-MCNC: 8.9 G/DL (ref 13–17.7)
HIV 1+2 AB+HIV1 P24 AG SERPL QL IA: NORMAL
IMM GRANULOCYTES # BLD AUTO: 0.01 10*3/MM3 (ref 0–0.05)
IMM GRANULOCYTES NFR BLD AUTO: 0.2 % (ref 0–0.5)
INR PPP: 1.12 (ref 0.9–1.1)
LYMPHOCYTES # BLD AUTO: 0.82 10*3/MM3 (ref 0.7–3.1)
LYMPHOCYTES NFR BLD AUTO: 17 % (ref 19.6–45.3)
MCH RBC QN AUTO: 27.9 PG (ref 26.6–33)
MCHC RBC AUTO-ENTMCNC: 32.1 G/DL (ref 31.5–35.7)
MCV RBC AUTO: 86.8 FL (ref 79–97)
MONOCYTES # BLD AUTO: 0.36 10*3/MM3 (ref 0.1–0.9)
MONOCYTES NFR BLD AUTO: 7.5 % (ref 5–12)
NEUTROPHILS NFR BLD AUTO: 3.56 10*3/MM3 (ref 1.7–7)
NEUTROPHILS NFR BLD AUTO: 74.1 % (ref 42.7–76)
NRBC BLD AUTO-RTO: 0 /100 WBC (ref 0–0.2)
NT-PROBNP SERPL-MCNC: 319 PG/ML (ref 0–450)
PLATELET # BLD AUTO: 255 10*3/MM3 (ref 140–450)
PMV BLD AUTO: 9.1 FL (ref 6–12)
POTASSIUM SERPL-SCNC: 3.6 MMOL/L (ref 3.5–5.2)
PROCALCITONIN SERPL-MCNC: 0.18 NG/ML (ref 0–0.25)
PROT SERPL-MCNC: 6.1 G/DL (ref 6–8.5)
PROTHROMBIN TIME: 14.6 SECONDS (ref 11.7–14.2)
RBC # BLD AUTO: 3.19 10*6/MM3 (ref 4.14–5.8)
RSV RNA RESP QL NAA+PROBE: NOT DETECTED
SALICYLATES SERPL-MCNC: <0.3 MG/DL
SARS-COV-2 RNA RESP QL NAA+PROBE: NOT DETECTED
SODIUM SERPL-SCNC: 139 MMOL/L (ref 136–145)
WBC NRBC COR # BLD AUTO: 4.81 10*3/MM3 (ref 3.4–10.8)

## 2024-04-11 PROCEDURE — G0378 HOSPITAL OBSERVATION PER HR: HCPCS

## 2024-04-11 PROCEDURE — 83605 ASSAY OF LACTIC ACID: CPT | Performed by: EMERGENCY MEDICINE

## 2024-04-11 PROCEDURE — 85730 THROMBOPLASTIN TIME PARTIAL: CPT | Performed by: EMERGENCY MEDICINE

## 2024-04-11 PROCEDURE — 80074 ACUTE HEPATITIS PANEL: CPT | Performed by: EMERGENCY MEDICINE

## 2024-04-11 PROCEDURE — 80053 COMPREHEN METABOLIC PANEL: CPT | Performed by: EMERGENCY MEDICINE

## 2024-04-11 PROCEDURE — 25010000002 HYDROMORPHONE PER 4 MG: Performed by: EMERGENCY MEDICINE

## 2024-04-11 PROCEDURE — 85610 PROTHROMBIN TIME: CPT | Performed by: EMERGENCY MEDICINE

## 2024-04-11 PROCEDURE — 25010000002 CEFEPIME PER 500 MG: Performed by: EMERGENCY MEDICINE

## 2024-04-11 PROCEDURE — 80143 DRUG ASSAY ACETAMINOPHEN: CPT | Performed by: EMERGENCY MEDICINE

## 2024-04-11 PROCEDURE — 80179 DRUG ASSAY SALICYLATE: CPT | Performed by: EMERGENCY MEDICINE

## 2024-04-11 PROCEDURE — 99285 EMERGENCY DEPT VISIT HI MDM: CPT

## 2024-04-11 PROCEDURE — 25010000002 CEFTRIAXONE PER 250 MG: Performed by: INTERNAL MEDICINE

## 2024-04-11 PROCEDURE — 25810000003 SODIUM CHLORIDE 0.9 % SOLUTION: Performed by: EMERGENCY MEDICINE

## 2024-04-11 PROCEDURE — 82550 ASSAY OF CK (CPK): CPT | Performed by: EMERGENCY MEDICINE

## 2024-04-11 PROCEDURE — 82077 ASSAY SPEC XCP UR&BREATH IA: CPT | Performed by: EMERGENCY MEDICINE

## 2024-04-11 PROCEDURE — 84145 PROCALCITONIN (PCT): CPT | Performed by: EMERGENCY MEDICINE

## 2024-04-11 PROCEDURE — 83880 ASSAY OF NATRIURETIC PEPTIDE: CPT | Performed by: EMERGENCY MEDICINE

## 2024-04-11 PROCEDURE — 25010000002 VANCOMYCIN HCL 1.25 G RECONSTITUTED SOLUTION 1 EACH VIAL: Performed by: EMERGENCY MEDICINE

## 2024-04-11 PROCEDURE — 87637 SARSCOV2&INF A&B&RSV AMP PRB: CPT | Performed by: EMERGENCY MEDICINE

## 2024-04-11 PROCEDURE — G0432 EIA HIV-1/HIV-2 SCREEN: HCPCS | Performed by: EMERGENCY MEDICINE

## 2024-04-11 PROCEDURE — 25810000003 SODIUM CHLORIDE 0.9 % SOLUTION 250 ML FLEX CONT: Performed by: EMERGENCY MEDICINE

## 2024-04-11 PROCEDURE — 85025 COMPLETE CBC W/AUTO DIFF WBC: CPT | Performed by: EMERGENCY MEDICINE

## 2024-04-11 PROCEDURE — 87040 BLOOD CULTURE FOR BACTERIA: CPT | Performed by: EMERGENCY MEDICINE

## 2024-04-11 PROCEDURE — 36415 COLL VENOUS BLD VENIPUNCTURE: CPT

## 2024-04-11 RX ORDER — SODIUM CHLORIDE 0.9 % (FLUSH) 0.9 %
10 SYRINGE (ML) INJECTION AS NEEDED
Status: DISCONTINUED | OUTPATIENT
Start: 2024-04-11 | End: 2024-04-16 | Stop reason: HOSPADM

## 2024-04-11 RX ORDER — MELATONIN
5000 DAILY
Status: DISCONTINUED | OUTPATIENT
Start: 2024-04-12 | End: 2024-04-16 | Stop reason: HOSPADM

## 2024-04-11 RX ORDER — CLONIDINE HYDROCHLORIDE 0.1 MG/1
0.1 TABLET ORAL 4 TIMES DAILY PRN
Status: ACTIVE | OUTPATIENT
Start: 2024-04-12 | End: 2024-04-13

## 2024-04-11 RX ORDER — ONDANSETRON 2 MG/ML
4 INJECTION INTRAMUSCULAR; INTRAVENOUS EVERY 6 HOURS PRN
Status: DISCONTINUED | OUTPATIENT
Start: 2024-04-11 | End: 2024-04-11

## 2024-04-11 RX ORDER — AMOXICILLIN 250 MG
2 CAPSULE ORAL 2 TIMES DAILY PRN
Status: DISCONTINUED | OUTPATIENT
Start: 2024-04-11 | End: 2024-04-16 | Stop reason: HOSPADM

## 2024-04-11 RX ORDER — CLONIDINE HYDROCHLORIDE 0.1 MG/1
0.1 TABLET ORAL 4 TIMES DAILY PRN
Status: DISPENSED | OUTPATIENT
Start: 2024-04-11 | End: 2024-04-12

## 2024-04-11 RX ORDER — GABAPENTIN 300 MG/1
300 CAPSULE ORAL EVERY 8 HOURS PRN
Status: DISCONTINUED | OUTPATIENT
Start: 2024-04-11 | End: 2024-04-16 | Stop reason: HOSPADM

## 2024-04-11 RX ORDER — ACETAMINOPHEN 160 MG/5ML
650 SOLUTION ORAL EVERY 4 HOURS PRN
Status: DISCONTINUED | OUTPATIENT
Start: 2024-04-11 | End: 2024-04-16 | Stop reason: HOSPADM

## 2024-04-11 RX ORDER — CLONIDINE HYDROCHLORIDE 0.1 MG/1
0.1 TABLET ORAL ONCE AS NEEDED
Status: ACTIVE | OUTPATIENT
Start: 2024-04-15 | End: 2024-04-16

## 2024-04-11 RX ORDER — POLYETHYLENE GLYCOL 3350 17 G/17G
17 POWDER, FOR SOLUTION ORAL DAILY PRN
Status: DISCONTINUED | OUTPATIENT
Start: 2024-04-11 | End: 2024-04-16 | Stop reason: HOSPADM

## 2024-04-11 RX ORDER — BISACODYL 5 MG/1
5 TABLET, DELAYED RELEASE ORAL DAILY PRN
Status: DISCONTINUED | OUTPATIENT
Start: 2024-04-11 | End: 2024-04-16 | Stop reason: HOSPADM

## 2024-04-11 RX ORDER — METHADONE HYDROCHLORIDE 10 MG/1
60 TABLET ORAL DAILY
Status: DISCONTINUED | OUTPATIENT
Start: 2024-04-11 | End: 2024-04-11

## 2024-04-11 RX ORDER — CLONIDINE HYDROCHLORIDE 0.1 MG/1
0.1 TABLET ORAL 2 TIMES DAILY PRN
Status: DISPENSED | OUTPATIENT
Start: 2024-04-14 | End: 2024-04-15

## 2024-04-11 RX ORDER — CLONIDINE HYDROCHLORIDE 0.1 MG/1
0.1 TABLET ORAL 3 TIMES DAILY PRN
Status: DISPENSED | OUTPATIENT
Start: 2024-04-13 | End: 2024-04-14

## 2024-04-11 RX ORDER — BUMETANIDE 2 MG/1
2 TABLET ORAL DAILY
Status: DISCONTINUED | OUTPATIENT
Start: 2024-04-12 | End: 2024-04-14

## 2024-04-11 RX ORDER — BISACODYL 10 MG
10 SUPPOSITORY, RECTAL RECTAL DAILY PRN
Status: DISCONTINUED | OUTPATIENT
Start: 2024-04-11 | End: 2024-04-16 | Stop reason: HOSPADM

## 2024-04-11 RX ORDER — ACETAMINOPHEN 650 MG/1
650 SUPPOSITORY RECTAL EVERY 4 HOURS PRN
Status: DISCONTINUED | OUTPATIENT
Start: 2024-04-11 | End: 2024-04-16 | Stop reason: HOSPADM

## 2024-04-11 RX ORDER — SODIUM CHLORIDE 9 MG/ML
125 INJECTION, SOLUTION INTRAVENOUS CONTINUOUS
Status: DISCONTINUED | OUTPATIENT
Start: 2024-04-11 | End: 2024-04-13

## 2024-04-11 RX ORDER — HYDROMORPHONE HYDROCHLORIDE 1 MG/ML
0.5 INJECTION, SOLUTION INTRAMUSCULAR; INTRAVENOUS; SUBCUTANEOUS ONCE
Status: COMPLETED | OUTPATIENT
Start: 2024-04-11 | End: 2024-04-11

## 2024-04-11 RX ORDER — ACETAMINOPHEN 325 MG/1
650 TABLET ORAL EVERY 4 HOURS PRN
Status: DISCONTINUED | OUTPATIENT
Start: 2024-04-11 | End: 2024-04-16 | Stop reason: HOSPADM

## 2024-04-11 RX ADMIN — SODIUM CHLORIDE 125 ML/HR: 9 INJECTION, SOLUTION INTRAVENOUS at 16:34

## 2024-04-11 RX ADMIN — HYDROMORPHONE HYDROCHLORIDE 0.5 MG: 1 INJECTION, SOLUTION INTRAMUSCULAR; INTRAVENOUS; SUBCUTANEOUS at 15:00

## 2024-04-11 RX ADMIN — ACETAMINOPHEN 325MG 650 MG: 325 TABLET ORAL at 22:48

## 2024-04-11 RX ADMIN — VANCOMYCIN HYDROCHLORIDE 1250 MG: 1.25 INJECTION, POWDER, LYOPHILIZED, FOR SOLUTION INTRAVENOUS at 16:26

## 2024-04-11 RX ADMIN — CEFEPIME 2000 MG: 2 INJECTION, POWDER, FOR SOLUTION INTRAVENOUS at 14:59

## 2024-04-11 RX ADMIN — SODIUM CHLORIDE 500 ML: 9 INJECTION, SOLUTION INTRAVENOUS at 14:59

## 2024-04-11 RX ADMIN — CLONIDINE HYDROCHLORIDE 0.1 MG: 0.1 TABLET ORAL at 22:44

## 2024-04-11 RX ADMIN — GABAPENTIN 300 MG: 300 CAPSULE ORAL at 22:45

## 2024-04-11 NOTE — ED TRIAGE NOTES
"Pt to ER from home via [facility]. Pt c/o \"feeling unwell\" and \"I need help.\" Pt has multiple wounds to bilateral forearms in various stages of healing. Pt reports using IV drugs. States last use was this morning. Ambulatory on scene for EMS.   "

## 2024-04-11 NOTE — ED NOTES
Pt found outside by this RN smoking a cigarette. Pt redirected by this RN and security. Placed back in room

## 2024-04-11 NOTE — ED NOTES
"Nursing report ED to floor  Ryan Marina  43 y.o.  male    HPI :  HPI (Adult)  Stated Reason for Visit: \"I need help\"  History Obtained From: patient    Chief Complaint  Chief Complaint   Patient presents with    Wound Check       Admitting doctor:   Cristine Matos MD    Admitting diagnosis:   The primary encounter diagnosis was Cellulitis of left arm. Diagnoses of Cellulitis of right arm, IV drug abuse, Chronic anemia, Non-traumatic rhabdomyolysis, and Narcotic dependence were also pertinent to this visit.    Code status:   Current Code Status       Date Active Code Status Order ID Comments User Context       Prior            Allergies:   Patient has no known allergies.    Isolation:   No active isolations    Intake and Output    Intake/Output Summary (Last 24 hours) at 4/11/2024 1632  Last data filed at 4/11/2024 1600  Gross per 24 hour   Intake 100 ml   Output --   Net 100 ml       Weight:       04/11/24  1628   Weight: 68 kg (150 lb)       Most recent vitals:   Vitals:    04/11/24 1313 04/11/24 1420 04/11/24 1422 04/11/24 1628   BP: 106/69 132/72     Pulse: 86 91 91    Resp: 18 18     Temp:       SpO2: 99% 100% 100%    Weight:    68 kg (150 lb)   Height:    182.9 cm (72\")       Active LDAs/IV Access:   Lines, Drains & Airways       Active LDAs       Name Placement date Placement time Site Days    Peripheral IV 04/11/24 1447 Anterior;Right;Upper Arm 04/11/24  1447  Arm  less than 1                    Labs (abnormal labs have a star):   Labs Reviewed   COMPREHENSIVE METABOLIC PANEL - Abnormal; Notable for the following components:       Result Value    Glucose 133 (*)     Calcium 8.3 (*)     AST (SGOT) 64 (*)     Alkaline Phosphatase 129 (*)     All other components within normal limits    Narrative:     GFR Normal >60  Chronic Kidney Disease <60  Kidney Failure <15     CK - Abnormal; Notable for the following components:    Creatine Kinase 621 (*)     All other components within normal limits   CBC WITH " "AUTO DIFFERENTIAL - Abnormal; Notable for the following components:    RBC 3.19 (*)     Hemoglobin 8.9 (*)     Hematocrit 27.7 (*)     Lymphocyte % 17.0 (*)     All other components within normal limits   PROTIME-INR - Abnormal; Notable for the following components:    Protime 14.6 (*)     INR 1.12 (*)     All other components within normal limits   COVID-19/FLUA&B/RSV, NP SWAB IN TRANSPORT MEDIA 1 HR TAT - Normal    Narrative:     Fact sheet for providers: https://www.fda.gov/media/631126/download    Fact sheet for patients: https://www.fda.gov/media/083324/download    Test performed by PCR.   ACETAMINOPHEN LEVEL - Normal   SALICYLATE LEVEL - Normal    Narrative:     Therapeutic range for Salicylates:  3.0 - 10.0 mg/dL for antipyretic/analgesic conditions  15.0 - 30.0 mg/dL for anti-inflammatory conditions   LACTIC ACID, PLASMA - Normal   PROCALCITONIN - Normal    Narrative:     As a Marker for Sepsis (Non-Neonates):    1. <0.5 ng/mL represents a low risk of severe sepsis and/or septic shock.  2. >2 ng/mL represents a high risk of severe sepsis and/or septic shock.    As a Marker for Lower Respiratory Tract Infections that require antibiotic therapy:    PCT on Admission    Antibiotic Therapy       6-12 Hrs later    >0.5                Strongly Recommended  >0.25 - <0.5        Recommended   0.1 - 0.25          Discouraged              Remeasure/reassess PCT  <0.1                Strongly Discouraged     Remeasure/reassess PCT    As 28 day mortality risk marker: \"Change in Procalcitonin Result\" (>80% or <=80%) if Day 0 (or Day 1) and Day 4 values are available. Refer to http://www.[employer]-pct-calculator.com    Change in PCT <=80%  A decrease of PCT levels below or equal to 80% defines a positive change in PCT test result representing a higher risk for 28-day all-cause mortality of patients diagnosed with severe sepsis for septic shock.    Change in PCT >80%  A decrease of PCT levels of more than 80% defines a negative " change in PCT result representing a lower risk for 28-day all-cause mortality of patients diagnosed with severe sepsis or septic shock.      BNP (IN-HOUSE) - Normal    Narrative:     This assay is used as an aid in the diagnosis of individuals suspected of having heart failure. It can be used as an aid in the diagnosis of acute decompensated heart failure (ADHF) in patients presenting with signs and symptoms of ADHF to the emergency department (ED). In addition, NT-proBNP of <300 pg/mL indicates ADHF is not likely.    Age Range Result Interpretation  NT-proBNP Concentration (pg/mL:      <50             Positive            >450                   Gray                 300-450                    Negative             <300    50-75           Positive            >900                  Gray                300-900                  Negative            <300      >75             Positive            >1800                  Gray                300-1800                  Negative            <300   APTT - Normal   HEPATITIS PANEL, ACUTE - Normal    Narrative:     Results may be falsely decreased if patient taking Biotin.    HIV-1/O/2 ANTIGEN/ANTIBODY - Normal    Narrative:     The HIV antibody/antigen combo assay is a qualitative assay for HIV that includes the p24 antigen as well as antibodies to HIV types 1 and 2. This test is intended to be used as a screening assay in the diagnosis of HIV infection in patients over the age of 2.   BLOOD CULTURE   BLOOD CULTURE   ETHANOL   HIV-1 AND HIV-2 ANTIBODIES    Narrative:     The following orders were created for panel order HIV-1 & HIV-2 Antibodies.  Procedure                               Abnormality         Status                     ---------                               -----------         ------                     HIV-1 / O / 2 Ag / Antibody[[id number]]  Normal              Final result                 Please view results for these tests on the individual orders.   URINALYSIS W/  MICROSCOPIC IF INDICATED (NO CULTURE)   URINE DRUG SCREEN   CBC AND DIFFERENTIAL    Narrative:     The following orders were created for panel order CBC & Differential.  Procedure                               Abnormality         Status                     ---------                               -----------         ------                     CBC Auto Differential[[id number]]        Abnormal            Final result                 Please view results for these tests on the individual orders.       EKG:   No orders to display       Meds given in ED:   Medications   sodium chloride 0.9 % flush 10 mL (has no administration in time range)   sodium chloride 0.9 % infusion (has no administration in time range)   Vancomycin HCl 1,250 mg in sodium chloride 0.9 % 250 mL VTB (1,250 mg Intravenous New Bag 4/11/24 1626)   sodium chloride 0.9 % bolus 500 mL (0 mL Intravenous Stopped 4/11/24 1630)   cefepime 2000 mg IVPB in 100 mL NS (MBP) (0 mg Intravenous Stopped 4/11/24 1600)   HYDROmorphone (DILAUDID) injection 0.5 mg (0.5 mg Intravenous Given 4/11/24 1500)       Imaging results:  No radiology results for the last day    Ambulatory status:   - assist    Social issues:   Social History     Socioeconomic History    Marital status: Single   Tobacco Use    Smoking status: Every Day     Current packs/day: 1.50     Types: Cigarettes   Substance and Sexual Activity    Alcohol use: No    Drug use: Yes     Types: Heroin    Sexual activity: Defer       Peripheral Neurovascular  Peripheral Neurovascular (Adult)  Peripheral Neurovascular WDL: .WDL except, neurovascular assessment upper, pulse assessment  Pulse Assessment: radial  LUE Neurovascular Assessment  Color LUE: red  Sensation LUE: tenderness present  RUE Neurovascular Assessment  Color RUE: red  Sensation RUE: tenderness present    Neuro Cognitive  Neuro Cognitive (Adult)  Cognitive/Neuro/Behavioral WDL: WDL    Learning  Learning Assessment (Adult)  Learning Readiness and Ability:  physical limitation noted  Education Provided  Person Taught: patient    Respiratory  Respiratory WDL  Respiratory WDL: WDL    Abdominal Pain       Pain Assessments  Pain (Adult)  (0-10) Pain Rating: Rest: 10  (0-10) Pain Rating: Activity: 10  Pain Location: extremity  Pain Side/Orientation: right, upper    NIH Stroke Scale       Мария Barbour RN  04/11/24 16:32 EDT

## 2024-04-11 NOTE — ED PROVIDER NOTES
EMERGENCY DEPARTMENT ENCOUNTER  Room Number:  33/33  PCP: Primitivo Link MD  Independent Historians: Patient      HPI:  Chief Complaint: Fever and chills    A complete HPI/ROS/PMH/PSH/SH/FH are unobtainable due to: None    Chronic or social conditions impacting patient care (Social Determinants of Health): Unemployed and Economic Stability (Employment, Income, Expenses, Debt, Medical Bills, Financial Resource Strain)      Context: Ryan Marina is a 43 y.o. male with a medical history of ongoing IV drug abuse and CKD who presents to the ED c/o acute fever and chills over the last week with worsening cellulitic changes of bilateral arms.  Patient admits to being unable to find any veins anymore so he has been subcutaneously injecting his IV drugs for quite some time now.  He reports pus draining from multiple lesions, having whole body aches and chills with fevers.  He reports poor appetite and general malaise.      Review of prior external notes (non-ED) -and- Review of prior external test results outside of this encounter:  Hospital discharge summary 1/10/2024.  Patient admitted for rhabdomyolysis.  Patient noted to have a history of CKD and IV drug use.      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    Vitamin B12 deficiency 04/21/2016    Vitamin D deficiency 04/21/2016    History of acute bronchitis 04/21/2016    History of acute sinusitis 04/21/2016    History of Depression with anxiety 04/21/2016    Rhabdomyolysis 01/08/2024     Resolved Ambulatory Problems     Diagnosis Date Noted    No Resolved Ambulatory Problems     Past Medical History:   Diagnosis Date    Anemia      Arthritis     CHF (congestive heart failure)     Renal disorder          PAST SURGICAL HISTORY  Past Surgical History:   Procedure Laterality Date    CHOLECYSTECTOMY  10/13/2009    10/13/2009--laparoscopic cholecystectomy.    FRACTURE SURGERY      KNEE ACL RECONSTRUCTION      1996 and 1999--anterior cruciate ligament reconstruction right knee.         FAMILY HISTORY  Family History   Problem Relation Age of Onset    Diabetes Mother         Type 2    Hyperthyroidism Father     Diabetes Maternal Grandmother         Type 2         SOCIAL HISTORY  Social History     Socioeconomic History    Marital status: Single   Tobacco Use    Smoking status: Every Day     Current packs/day: 1.50     Types: Cigarettes   Substance and Sexual Activity    Alcohol use: No    Drug use: Yes     Types: Heroin    Sexual activity: Defer         ALLERGIES  Patient has no known allergies.      REVIEW OF SYSTEMS  Review of Systems  Included in HPI  All systems reviewed and negative except for those discussed in HPI.      PHYSICAL EXAM    I have reviewed the triage vital signs and nursing notes.    ED Triage Vitals   Temp Heart Rate Resp BP SpO2   04/11/24 1312 04/11/24 1313 04/11/24 1313 04/11/24 1313 04/11/24 1313   98.6 °F (37 °C) 86 18 106/69 99 %      Temp src Heart Rate Source Patient Position BP Location FiO2 (%)   -- -- -- -- --              Physical Exam    Physical Exam   Constitutional: No distress.  Rather ill-appearing  HENT:  Head: Normocephalic and atraumatic.   Oropharynx: Mucous membranes are moist.   Eyes: . No scleral icterus. No conjunctival pallor.  Neck: Normal range of motion. Neck supple.   Cardiovascular: Pink warm and well perfused throughout.    Pulmonary/Chest: No respiratory distress.  No tachypnea or increased work of breathing appreciated.  Clear  Abdominal: Soft. There is no tenderness. There is no rebound and no guarding.  Benign  Musculoskeletal: Atraumatic lower extremities though he does have some dependent  edema bilateral ankles.  Bilateral upper extremities from just above the elbows distally on the dorsum of both arms are innumerable open purulent draining lesions with surrounding cellulitic change.  Neurological: Alert and oriented.  No acute focal deficit appreciated.  Skin: Skin is pink, warm, and dry.   Psychiatric: Mood and affect normal.   Nursing note and vitals reviewed.             LAB RESULTS  Recent Results (from the past 24 hour(s))   Comprehensive Metabolic Panel    Collection Time: 04/11/24  2:37 PM    Specimen: Blood   Result Value Ref Range    Glucose 133 (H) 65 - 99 mg/dL    BUN 11 6 - 20 mg/dL    Creatinine 1.17 0.76 - 1.27 mg/dL    Sodium 139 136 - 145 mmol/L    Potassium 3.6 3.5 - 5.2 mmol/L    Chloride 106 98 - 107 mmol/L    CO2 24.0 22.0 - 29.0 mmol/L    Calcium 8.3 (L) 8.6 - 10.5 mg/dL    Total Protein 6.1 6.0 - 8.5 g/dL    Albumin 3.5 3.5 - 5.2 g/dL    ALT (SGPT) 25 1 - 41 U/L    AST (SGOT) 64 (H) 1 - 40 U/L    Alkaline Phosphatase 129 (H) 39 - 117 U/L    Total Bilirubin 0.4 0.0 - 1.2 mg/dL    Globulin 2.6 gm/dL    A/G Ratio 1.3 g/dL    BUN/Creatinine Ratio 9.4 7.0 - 25.0    Anion Gap 9.0 5.0 - 15.0 mmol/L    eGFR 79.3 >60.0 mL/min/1.73   CK    Collection Time: 04/11/24  2:37 PM    Specimen: Blood   Result Value Ref Range    Creatine Kinase 621 (H) 20 - 200 U/L   Acetaminophen Level    Collection Time: 04/11/24  2:37 PM    Specimen: Blood   Result Value Ref Range    Acetaminophen <5.0 0.0 - 30.0 mcg/mL   Ethanol    Collection Time: 04/11/24  2:37 PM    Specimen: Blood   Result Value Ref Range    Ethanol <10 0 - 10 mg/dL    Ethanol % <0.010 %   Salicylate Level    Collection Time: 04/11/24  2:37 PM    Specimen: Blood   Result Value Ref Range    Salicylate <0.3 <=30.0 mg/dL   CBC Auto Differential    Collection Time: 04/11/24  2:37 PM    Specimen: Blood   Result Value Ref Range    WBC 4.81 3.40 - 10.80 10*3/mm3    RBC 3.19 (L) 4.14 - 5.80 10*6/mm3    Hemoglobin 8.9 (L) 13.0 - 17.7 g/dL     Hematocrit 27.7 (L) 37.5 - 51.0 %    MCV 86.8 79.0 - 97.0 fL    MCH 27.9 26.6 - 33.0 pg    MCHC 32.1 31.5 - 35.7 g/dL    RDW 13.8 12.3 - 15.4 %    RDW-SD 43.7 37.0 - 54.0 fl    MPV 9.1 6.0 - 12.0 fL    Platelets 255 140 - 450 10*3/mm3    Neutrophil % 74.1 42.7 - 76.0 %    Lymphocyte % 17.0 (L) 19.6 - 45.3 %    Monocyte % 7.5 5.0 - 12.0 %    Eosinophil % 1.0 0.3 - 6.2 %    Basophil % 0.2 0.0 - 1.5 %    Immature Grans % 0.2 0.0 - 0.5 %    Neutrophils, Absolute 3.56 1.70 - 7.00 10*3/mm3    Lymphocytes, Absolute 0.82 0.70 - 3.10 10*3/mm3    Monocytes, Absolute 0.36 0.10 - 0.90 10*3/mm3    Eosinophils, Absolute 0.05 0.00 - 0.40 10*3/mm3    Basophils, Absolute 0.01 0.00 - 0.20 10*3/mm3    Immature Grans, Absolute 0.01 0.00 - 0.05 10*3/mm3    nRBC 0.0 0.0 - 0.2 /100 WBC   Lactic Acid, Plasma    Collection Time: 04/11/24  2:37 PM    Specimen: Blood   Result Value Ref Range    Lactate 1.4 0.5 - 2.0 mmol/L   Procalcitonin    Collection Time: 04/11/24  2:37 PM    Specimen: Blood   Result Value Ref Range    Procalcitonin 0.18 0.00 - 0.25 ng/mL   BNP    Collection Time: 04/11/24  2:37 PM    Specimen: Blood   Result Value Ref Range    proBNP 319.0 0.0 - 450.0 pg/mL   Protime-INR    Collection Time: 04/11/24  2:37 PM    Specimen: Blood   Result Value Ref Range    Protime 14.6 (H) 11.7 - 14.2 Seconds    INR 1.12 (H) 0.90 - 1.10   aPTT    Collection Time: 04/11/24  2:37 PM    Specimen: Blood   Result Value Ref Range    PTT 29.8 22.7 - 35.4 seconds   Hepatitis Panel, Acute    Collection Time: 04/11/24  2:37 PM    Specimen: Blood   Result Value Ref Range    Hepatitis B Surface Ag Non-Reactive Non-Reactive    Hep A IgM Non-Reactive Non-Reactive    Hep B C IgM Non-Reactive Non-Reactive    Hepatitis C Ab Non-Reactive Non-Reactive   HIV-1 / O / 2 Ag / Antibody    Collection Time: 04/11/24  2:37 PM    Specimen: Blood   Result Value Ref Range    HIV DUO Non-Reactive Non-Reactive         RADIOLOGY  No Radiology Exams Resulted Within Past 24  Hours      MEDICATIONS GIVEN IN ER  Medications   sodium chloride 0.9 % flush 10 mL (has no administration in time range)   sodium chloride 0.9 % infusion (has no administration in time range)   Vancomycin HCl 1,250 mg in sodium chloride 0.9 % 250 mL VTB (has no administration in time range)   sodium chloride 0.9 % bolus 500 mL (500 mL Intravenous New Bag 4/11/24 1459)   cefepime 2000 mg IVPB in 100 mL NS (MBP) (2,000 mg Intravenous New Bag 4/11/24 1459)   HYDROmorphone (DILAUDID) injection 0.5 mg (0.5 mg Intravenous Given 4/11/24 1500)         ORDERS PLACED DURING THIS VISIT:  Orders Placed This Encounter   Procedures    Blood Culture - Blood,    Blood Culture - Blood,    COVID-19, FLU A/B, RSV PCR 1 HR TAT - Swab, Nasopharynx    Comprehensive Metabolic Panel    Urinalysis With Microscopic If Indicated (No Culture) - Urine, Clean Catch    CK    Acetaminophen Level    Ethanol    Urine Drug Screen - Urine, Clean Catch    Salicylate Level    CBC Auto Differential    Lactic Acid, Plasma    Procalcitonin    BNP    Protime-INR    aPTT    HIV-1 & HIV-2 Antibodies    Hepatitis Panel, Acute    HIV-1 / O / 2 Ag / Antibody    Monitor Blood Pressure    [facility] (on-call MD unless specified) Details    Insert Peripheral IV    Initiate Observation Status    CBC & Differential         OUTPATIENT MEDICATION MANAGEMENT:  Current Facility-Administered Medications Ordered in Epic   Medication Dose Route Frequency Provider Last Rate Last Admin    sodium chloride 0.9 % flush 10 mL  10 mL Intravenous PRN Christiano Sanford MD        sodium chloride 0.9 % infusion  125 mL/hr Intravenous Continuous Christiano Sanford MD        Vancomycin HCl 1,250 mg in sodium chloride 0.9 % 250 mL VTB  1,250 mg Intravenous Once Christiano Sanford MD         Current Outpatient Medications Ordered in Epic   Medication Sig Dispense Refill    bumetanide (BUMEX) 2 MG tablet Take 1 tablet by mouth.      Cholecalciferol (VITAMIN D3) 5000 UNITS capsule capsule Take 1 capsule by  mouth Daily.      methadone (DOLOPHINE) 5 MG tablet Take 24 tablets by mouth Daily.      mycophenolate (CELLCEPT) 500 MG tablet Take  by mouth 2 (Two) Times a Day.      predniSONE (DELTASONE) 20 MG tablet Take 1 tablet by mouth Daily.           PROCEDURES  Procedures            PROGRESS, DATA ANALYSIS, CONSULTS, AND MEDICAL DECISION MAKING  All labs have been independently interpreted by me.  All radiology studies have been reviewed by me. All EKG's have been independently viewed and interpreted by me.  Discussion below represents my analysis of pertinent findings related to patient's condition, differential diagnosis, treatment plan and final disposition.    Differential diagnosis:   My differential diagnosis for fever includes but is not limited:  To viral infections including COVID-19, bacterial infections, fungal infections, fever of unknown origin, auto regulatory dysfunction, hyperthermia, heat exhaustion, heat stroke, malignant neuroleptic syndrome and others.      Clinical Scores:                  ED Course as of 04/11/24 1557   u Apr 11, 2024   1519 WBC: 4.81 [RS]   1519 Hemoglobin(!): 8.9  Chronic anemia [RS]   1519 Platelets: 255 [RS]   1519 Lactate: 1.4 [RS]   1523 Ethanol: <10 [RS]   1523 Creatine Kinase(!): 621 [RS]   1523 Salicylate: <0.3 [RS]   1523 Acetaminophen: <5.0 [RS]   1523 Glucose(!): 133 [RS]   1523 BUN: 11 [RS]   1523 Creatinine: 1.17 [RS]   1523 Sodium: 139 [RS]   1523 Potassium: 3.6 [RS]   1524 AST (SGOT)(!): 64 [RS]   1524 Total Bilirubin: 0.4 [RS]   1552 HIV DUO: Non-Reactive [RS]   1552 Procalcitonin: 0.18 [RS]   1552 proBNP: 319.0 [RS]   1557 CONSULT        Provider: Dr. Matos-EMILY    Discussion: Reviewed patient history, ED presentation and evaluation as well as therapies initiated in the ED.  Agreeable to accept patient for observation admission to the 5 S. unit.    Agreeable c treatment and planned disposition.         [RS]      ED Course User Index  [RS] Chritsiano Sanfodr MD          Prescription drug monitoring program review:     AS OF 15:57 EDT VITALS:    BP - 106/69  HR - 86  TEMP - 98.6 °F (37 °C)  O2 SATS - 99%    COMPLEXITY OF CARE  The patient requires admission.      DIAGNOSIS  Final diagnoses:   Cellulitis of left arm   Cellulitis of right arm   IV drug abuse   Chronic anemia   Non-traumatic rhabdomyolysis   Narcotic dependence         DISPOSITION  ED Disposition       ED Disposition   Decision to Admit    Condition   --    Comment   Level of Care: Med/Surg [1]   Diagnosis: Cellulitis of arm [333944]   Admitting Physician: GWEN MORRISON [[id number]]   Bed Request Comments: 5 [address]                    ADMISSION    Discussed treatment plan and reason for admission with pt/family and admitting physician.  Pt/family voiced understanding of the plan for admission for further testing/treatment as needed.       Please note that portions of this document were completed with a voice recognition program.    Note Disclaimer: At [facility], we believe that sharing information builds trust and better relationships. You are receiving this note because you recently visited [facility]. It is possible you will see health information before a provider has talked with you about it. This kind of information can be easy to misunderstand. To help you fully understand what it means for your health, we urge you to discuss this note with your provider.         Christiano Sanford MD  04/11/24 7626

## 2024-04-11 NOTE — ED NOTES
".Nursing report ED to floor  Ryan Marina  43 y.o.  male    HPI :  HPI (Adult)  Stated Reason for Visit: \"I need help\"  History Obtained From: patient    Chief Complaint  Chief Complaint   Patient presents with    Wound Check       Admitting doctor:   Cristine Matos MD    Admitting diagnosis:   The primary encounter diagnosis was Cellulitis of left arm. Diagnoses of Cellulitis of right arm, IV drug abuse, Chronic anemia, Non-traumatic rhabdomyolysis, and Narcotic dependence were also pertinent to this visit.    Code status:   Current Code Status       Date Active Code Status Order ID Comments User Context       Prior            Allergies:   Patient has no known allergies.    Isolation:   No active isolations    Intake and Output    Intake/Output Summary (Last 24 hours) at 4/11/2024 1632  Last data filed at 4/11/2024 1600  Gross per 24 hour   Intake 100 ml   Output --   Net 100 ml       Weight:       04/11/24  1628   Weight: 68 kg (150 lb)       Most recent vitals:   Vitals:    04/11/24 1313 04/11/24 1420 04/11/24 1422 04/11/24 1628   BP: 106/69 132/72     Pulse: 86 91 91    Resp: 18 18     Temp:       SpO2: 99% 100% 100%    Weight:    68 kg (150 lb)   Height:    182.9 cm (72\")       Active LDAs/IV Access:   Lines, Drains & Airways       Active LDAs       Name Placement date Placement time Site Days    Peripheral IV 04/11/24 1447 Anterior;Right;Upper Arm 04/11/24  1447  Arm  less than 1                    Labs (abnormal labs have a star):   Labs Reviewed   COMPREHENSIVE METABOLIC PANEL - Abnormal; Notable for the following components:       Result Value    Glucose 133 (*)     Calcium 8.3 (*)     AST (SGOT) 64 (*)     Alkaline Phosphatase 129 (*)     All other components within normal limits    Narrative:     GFR Normal >60  Chronic Kidney Disease <60  Kidney Failure <15     CK - Abnormal; Notable for the following components:    Creatine Kinase 621 (*)     All other components within normal limits   CBC WITH " "AUTO DIFFERENTIAL - Abnormal; Notable for the following components:    RBC 3.19 (*)     Hemoglobin 8.9 (*)     Hematocrit 27.7 (*)     Lymphocyte % 17.0 (*)     All other components within normal limits   PROTIME-INR - Abnormal; Notable for the following components:    Protime 14.6 (*)     INR 1.12 (*)     All other components within normal limits   COVID-19/FLUA&B/RSV, NP SWAB IN TRANSPORT MEDIA 1 HR TAT - Normal    Narrative:     Fact sheet for providers: https://www.fda.gov/media/507940/download    Fact sheet for patients: https://www.fda.gov/media/714926/download    Test performed by PCR.   ACETAMINOPHEN LEVEL - Normal   SALICYLATE LEVEL - Normal    Narrative:     Therapeutic range for Salicylates:  3.0 - 10.0 mg/dL for antipyretic/analgesic conditions  15.0 - 30.0 mg/dL for anti-inflammatory conditions   LACTIC ACID, PLASMA - Normal   PROCALCITONIN - Normal    Narrative:     As a Marker for Sepsis (Non-Neonates):    1. <0.5 ng/mL represents a low risk of severe sepsis and/or septic shock.  2. >2 ng/mL represents a high risk of severe sepsis and/or septic shock.    As a Marker for Lower Respiratory Tract Infections that require antibiotic therapy:    PCT on Admission    Antibiotic Therapy       6-12 Hrs later    >0.5                Strongly Recommended  >0.25 - <0.5        Recommended   0.1 - 0.25          Discouraged              Remeasure/reassess PCT  <0.1                Strongly Discouraged     Remeasure/reassess PCT    As 28 day mortality risk marker: \"Change in Procalcitonin Result\" (>80% or <=80%) if Day 0 (or Day 1) and Day 4 values are available. Refer to http://www.[employer]-pct-calculator.com    Change in PCT <=80%  A decrease of PCT levels below or equal to 80% defines a positive change in PCT test result representing a higher risk for 28-day all-cause mortality of patients diagnosed with severe sepsis for septic shock.    Change in PCT >80%  A decrease of PCT levels of more than 80% defines a negative " change in PCT result representing a lower risk for 28-day all-cause mortality of patients diagnosed with severe sepsis or septic shock.      BNP (IN-HOUSE) - Normal    Narrative:     This assay is used as an aid in the diagnosis of individuals suspected of having heart failure. It can be used as an aid in the diagnosis of acute decompensated heart failure (ADHF) in patients presenting with signs and symptoms of ADHF to the emergency department (ED). In addition, NT-proBNP of <300 pg/mL indicates ADHF is not likely.    Age Range Result Interpretation  NT-proBNP Concentration (pg/mL:      <50             Positive            >450                   Gray                 300-450                    Negative             <300    50-75           Positive            >900                  Gray                300-900                  Negative            <300      >75             Positive            >1800                  Gray                300-1800                  Negative            <300   APTT - Normal   HEPATITIS PANEL, ACUTE - Normal    Narrative:     Results may be falsely decreased if patient taking Biotin.    HIV-1/O/2 ANTIGEN/ANTIBODY - Normal    Narrative:     The HIV antibody/antigen combo assay is a qualitative assay for HIV that includes the p24 antigen as well as antibodies to HIV types 1 and 2. This test is intended to be used as a screening assay in the diagnosis of HIV infection in patients over the age of 2.   BLOOD CULTURE   BLOOD CULTURE   ETHANOL   HIV-1 AND HIV-2 ANTIBODIES    Narrative:     The following orders were created for panel order HIV-1 & HIV-2 Antibodies.  Procedure                               Abnormality         Status                     ---------                               -----------         ------                     HIV-1 / O / 2 Ag / Antibody[[id number]]  Normal              Final result                 Please view results for these tests on the individual orders.   URINALYSIS W/  MICROSCOPIC IF INDICATED (NO CULTURE)   URINE DRUG SCREEN   CBC AND DIFFERENTIAL    Narrative:     The following orders were created for panel order CBC & Differential.  Procedure                               Abnormality         Status                     ---------                               -----------         ------                     CBC Auto Differential[[id number]]        Abnormal            Final result                 Please view results for these tests on the individual orders.       EKG:   No orders to display       Meds given in ED:   Medications   sodium chloride 0.9 % flush 10 mL (has no administration in time range)   sodium chloride 0.9 % infusion (has no administration in time range)   Vancomycin HCl 1,250 mg in sodium chloride 0.9 % 250 mL VTB (1,250 mg Intravenous New Bag 4/11/24 1626)   sodium chloride 0.9 % bolus 500 mL (0 mL Intravenous Stopped 4/11/24 1630)   cefepime 2000 mg IVPB in 100 mL NS (MBP) (0 mg Intravenous Stopped 4/11/24 1600)   HYDROmorphone (DILAUDID) injection 0.5 mg (0.5 mg Intravenous Given 4/11/24 1500)       Imaging results:  No radiology results for the last day    Ambulatory status:   - ad aj    Social issues:   Social History     Socioeconomic History    Marital status: Single   Tobacco Use    Smoking status: Every Day     Current packs/day: 1.50     Types: Cigarettes   Substance and Sexual Activity    Alcohol use: No    Drug use: Yes     Types: Heroin    Sexual activity: Defer       Peripheral Neurovascular  Peripheral Neurovascular (Adult)  Peripheral Neurovascular WDL: .WDL except, neurovascular assessment upper, pulse assessment  Pulse Assessment: radial  LUE Neurovascular Assessment  Color LUE: red  Sensation LUE: tenderness present  RUE Neurovascular Assessment  Color RUE: red  Sensation RUE: tenderness present    Neuro Cognitive  Neuro Cognitive (Adult)  Cognitive/Neuro/Behavioral WDL: WDL    Learning  Learning Assessment (Adult)  Learning Readiness and Ability:  physical limitation noted  Education Provided  Person Taught: patient    Respiratory  Respiratory WDL  Respiratory WDL: WDL    Abdominal Pain       Pain Assessments  Pain (Adult)  (0-10) Pain Rating: Rest: 10  (0-10) Pain Rating: Activity: 10  Pain Location: extremity  Pain Side/Orientation: right, upper    NIH Stroke Scale       Libby Almanza RN  04/11/24 16:32 EDT

## 2024-04-12 ENCOUNTER — APPOINTMENT (OUTPATIENT)
Dept: CT IMAGING | Facility: HOSPITAL | Age: 44
End: 2024-04-12
Payer: MEDICARE

## 2024-04-12 PROBLEM — N18.2 CKD (CHRONIC KIDNEY DISEASE) STAGE 2, GFR 60-89 ML/MIN: Status: ACTIVE | Noted: 2024-04-12

## 2024-04-12 PROBLEM — F13.10 BENZODIAZEPINE ABUSE: Status: ACTIVE | Noted: 2024-04-12

## 2024-04-12 PROBLEM — F11.20 OPIOID USE DISORDER, SEVERE, DEPENDENCE: Status: ACTIVE | Noted: 2024-04-12

## 2024-04-12 PROBLEM — D63.8 ANEMIA, CHRONIC DISEASE: Status: ACTIVE | Noted: 2024-04-12

## 2024-04-12 LAB
AMPHET+METHAMPHET UR QL: NEGATIVE
ANION GAP SERPL CALCULATED.3IONS-SCNC: 9.8 MMOL/L (ref 5–15)
BARBITURATES UR QL SCN: NEGATIVE
BASOPHILS # BLD AUTO: 0.02 10*3/MM3 (ref 0–0.2)
BASOPHILS NFR BLD AUTO: 0.6 % (ref 0–1.5)
BENZODIAZ UR QL SCN: POSITIVE
BILIRUB UR QL STRIP: NEGATIVE
BUN SERPL-MCNC: 8 MG/DL (ref 6–20)
BUN/CREAT SERPL: 8.2 (ref 7–25)
CALCIUM SPEC-SCNC: 7.9 MG/DL (ref 8.6–10.5)
CANNABINOIDS SERPL QL: NEGATIVE
CHLORIDE SERPL-SCNC: 110 MMOL/L (ref 98–107)
CLARITY UR: CLEAR
CO2 SERPL-SCNC: 21.2 MMOL/L (ref 22–29)
COCAINE UR QL: NEGATIVE
COLOR UR: YELLOW
CREAT SERPL-MCNC: 0.98 MG/DL (ref 0.76–1.27)
DEPRECATED RDW RBC AUTO: 40.9 FL (ref 37–54)
EGFRCR SERPLBLD CKD-EPI 2021: 98.1 ML/MIN/1.73
EOSINOPHIL # BLD AUTO: 0.11 10*3/MM3 (ref 0–0.4)
EOSINOPHIL NFR BLD AUTO: 3.1 % (ref 0.3–6.2)
ERYTHROCYTE [DISTWIDTH] IN BLOOD BY AUTOMATED COUNT: 13.4 % (ref 12.3–15.4)
FENTANYL UR-MCNC: POSITIVE NG/ML
GLUCOSE SERPL-MCNC: 148 MG/DL (ref 65–99)
GLUCOSE UR STRIP-MCNC: NEGATIVE MG/DL
HCT VFR BLD AUTO: 26.2 % (ref 37.5–51)
HGB BLD-MCNC: 8.6 G/DL (ref 13–17.7)
HGB UR QL STRIP.AUTO: NEGATIVE
IMM GRANULOCYTES # BLD AUTO: 0.01 10*3/MM3 (ref 0–0.05)
IMM GRANULOCYTES NFR BLD AUTO: 0.3 % (ref 0–0.5)
KETONES UR QL STRIP: NEGATIVE
LEUKOCYTE ESTERASE UR QL STRIP.AUTO: NEGATIVE
LYMPHOCYTES # BLD AUTO: 0.84 10*3/MM3 (ref 0.7–3.1)
LYMPHOCYTES NFR BLD AUTO: 23.7 % (ref 19.6–45.3)
MCH RBC QN AUTO: 27.7 PG (ref 26.6–33)
MCHC RBC AUTO-ENTMCNC: 32.8 G/DL (ref 31.5–35.7)
MCV RBC AUTO: 84.2 FL (ref 79–97)
METHADONE UR QL SCN: NEGATIVE
MONOCYTES # BLD AUTO: 0.25 10*3/MM3 (ref 0.1–0.9)
MONOCYTES NFR BLD AUTO: 7 % (ref 5–12)
NEUTROPHILS NFR BLD AUTO: 2.32 10*3/MM3 (ref 1.7–7)
NEUTROPHILS NFR BLD AUTO: 65.3 % (ref 42.7–76)
NITRITE UR QL STRIP: NEGATIVE
NRBC BLD AUTO-RTO: 0 /100 WBC (ref 0–0.2)
OPIATES UR QL: NEGATIVE
OXYCODONE UR QL SCN: NEGATIVE
PH UR STRIP.AUTO: 7.5 [PH] (ref 5–8)
PLATELET # BLD AUTO: 221 10*3/MM3 (ref 140–450)
PMV BLD AUTO: 9 FL (ref 6–12)
POTASSIUM SERPL-SCNC: 3.6 MMOL/L (ref 3.5–5.2)
PROT UR QL STRIP: NEGATIVE
RBC # BLD AUTO: 3.11 10*6/MM3 (ref 4.14–5.8)
SODIUM SERPL-SCNC: 141 MMOL/L (ref 136–145)
SP GR UR STRIP: 1.03 (ref 1–1.03)
UROBILINOGEN UR QL STRIP: NORMAL
VANCOMYCIN TROUGH SERPL-MCNC: 12.5 MCG/ML (ref 5–20)
WBC NRBC COR # BLD AUTO: 3.55 10*3/MM3 (ref 3.4–10.8)

## 2024-04-12 PROCEDURE — G0378 HOSPITAL OBSERVATION PER HR: HCPCS

## 2024-04-12 PROCEDURE — 25010000002 VANCOMYCIN 750 MG RECONSTITUTED SOLUTION 1 EACH VIAL: Performed by: INTERNAL MEDICINE

## 2024-04-12 PROCEDURE — 97162 PT EVAL MOD COMPLEX 30 MIN: CPT

## 2024-04-12 PROCEDURE — 80307 DRUG TEST PRSMV CHEM ANLYZR: CPT | Performed by: HOSPITALIST

## 2024-04-12 PROCEDURE — 25010000002 VANCOMYCIN 1 G RECONSTITUTED SOLUTION 1 EACH VIAL: Performed by: INTERNAL MEDICINE

## 2024-04-12 PROCEDURE — 80202 ASSAY OF VANCOMYCIN: CPT | Performed by: HOSPITALIST

## 2024-04-12 PROCEDURE — 97166 OT EVAL MOD COMPLEX 45 MIN: CPT

## 2024-04-12 PROCEDURE — 25810000003 SODIUM CHLORIDE 0.9 % SOLUTION 250 ML FLEX CONT: Performed by: INTERNAL MEDICINE

## 2024-04-12 PROCEDURE — 25810000003 SODIUM CHLORIDE 0.9 % SOLUTION: Performed by: EMERGENCY MEDICINE

## 2024-04-12 PROCEDURE — 99223 1ST HOSP IP/OBS HIGH 75: CPT | Performed by: STUDENT IN AN ORGANIZED HEALTH CARE EDUCATION/TRAINING PROGRAM

## 2024-04-12 PROCEDURE — 73201 CT UPPER EXTREMITY W/DYE: CPT

## 2024-04-12 PROCEDURE — 36415 COLL VENOUS BLD VENIPUNCTURE: CPT | Performed by: INTERNAL MEDICINE

## 2024-04-12 PROCEDURE — 81003 URINALYSIS AUTO W/O SCOPE: CPT | Performed by: EMERGENCY MEDICINE

## 2024-04-12 PROCEDURE — 99233 SBSQ HOSP IP/OBS HIGH 50: CPT | Performed by: ORTHOPAEDIC SURGERY

## 2024-04-12 PROCEDURE — 25510000001 IOPAMIDOL PER 1 ML: Performed by: HOSPITALIST

## 2024-04-12 PROCEDURE — 85025 COMPLETE CBC W/AUTO DIFF WBC: CPT | Performed by: INTERNAL MEDICINE

## 2024-04-12 PROCEDURE — 80048 BASIC METABOLIC PNL TOTAL CA: CPT | Performed by: INTERNAL MEDICINE

## 2024-04-12 PROCEDURE — 90791 PSYCH DIAGNOSTIC EVALUATION: CPT

## 2024-04-12 PROCEDURE — 25010000002 HYDROMORPHONE PER 4 MG: Performed by: NURSE PRACTITIONER

## 2024-04-12 RX ORDER — HYDROCODONE BITARTRATE AND ACETAMINOPHEN 5; 325 MG/1; MG/1
1 TABLET ORAL EVERY 6 HOURS PRN
Status: DISCONTINUED | OUTPATIENT
Start: 2024-04-12 | End: 2024-04-16 | Stop reason: HOSPADM

## 2024-04-12 RX ORDER — HYDROXYZINE HYDROCHLORIDE 25 MG/1
25 TABLET, FILM COATED ORAL 3 TIMES DAILY PRN
Status: DISCONTINUED | OUTPATIENT
Start: 2024-04-12 | End: 2024-04-16 | Stop reason: HOSPADM

## 2024-04-12 RX ORDER — LORAZEPAM 1 MG/1
1 TABLET ORAL EVERY 6 HOURS
Status: COMPLETED | OUTPATIENT
Start: 2024-04-12 | End: 2024-04-13

## 2024-04-12 RX ORDER — NICOTINE 21 MG/24HR
1 PATCH, TRANSDERMAL 24 HOURS TRANSDERMAL
Status: DISCONTINUED | OUTPATIENT
Start: 2024-04-12 | End: 2024-04-16 | Stop reason: HOSPADM

## 2024-04-12 RX ORDER — HYDROMORPHONE HYDROCHLORIDE 1 MG/ML
0.5 INJECTION, SOLUTION INTRAMUSCULAR; INTRAVENOUS; SUBCUTANEOUS ONCE
Status: COMPLETED | OUTPATIENT
Start: 2024-04-12 | End: 2024-04-12

## 2024-04-12 RX ADMIN — GABAPENTIN 300 MG: 300 CAPSULE ORAL at 16:56

## 2024-04-12 RX ADMIN — VANCOMYCIN HYDROCHLORIDE 750 MG: 750 INJECTION, POWDER, LYOPHILIZED, FOR SOLUTION INTRAVENOUS at 21:09

## 2024-04-12 RX ADMIN — GABAPENTIN 300 MG: 300 CAPSULE ORAL at 08:36

## 2024-04-12 RX ADMIN — Medication 1 PATCH: at 08:30

## 2024-04-12 RX ADMIN — LORAZEPAM 1 MG: 1 TABLET ORAL at 22:42

## 2024-04-12 RX ADMIN — HYDROMORPHONE HYDROCHLORIDE 0.5 MG: 1 INJECTION, SOLUTION INTRAMUSCULAR; INTRAVENOUS; SUBCUTANEOUS at 05:19

## 2024-04-12 RX ADMIN — SODIUM CHLORIDE 125 ML/HR: 9 INJECTION, SOLUTION INTRAVENOUS at 21:09

## 2024-04-12 RX ADMIN — LORAZEPAM 1 MG: 1 TABLET ORAL at 16:06

## 2024-04-12 RX ADMIN — VANCOMYCIN HYDROCHLORIDE 1000 MG: 1 INJECTION, POWDER, LYOPHILIZED, FOR SOLUTION INTRAVENOUS at 05:27

## 2024-04-12 RX ADMIN — HYDROXYZINE HYDROCHLORIDE 25 MG: 25 TABLET ORAL at 05:19

## 2024-04-12 RX ADMIN — IOPAMIDOL 85 ML: 755 INJECTION, SOLUTION INTRAVENOUS at 11:53

## 2024-04-12 RX ADMIN — HYDROCODONE BITARTRATE AND ACETAMINOPHEN 1 TABLET: 5; 325 TABLET ORAL at 16:56

## 2024-04-12 NOTE — PLAN OF CARE
Goal Outcome Evaluation:  Plan of Care Reviewed With: patient        Progress: no change  Outcome Evaluation: Pt is a 43 y.o. male admitted with cellulitis of arms with PMH of QUIQUE, IV drug use, and destruction of skeletal muscle. Pt reports he lives alone on the main floor of his home and is independent with all ADL and household management tasks. Pt reports he uses a crutch for stability and experiences numbness in B/L feet at baseline. Pt participated in bed mobility with SBA, LBD tasks with SBA, and toileting tasks with SBA. Pt appears at baseline function and not appropriate for skilled IPOT services. Pt reports plan is to d/c home.

## 2024-04-12 NOTE — CONSULTS
"   Orthopedic Consult      Patient: Ryan Marina    Date of Admission: 4/11/2024  1:42 PM    YOB: 1980    Medical Record Number: [id number]    Consulting Physician: Ishan Kaur*    Chief Complaints: Bilateral upper extremity wounds    History of Present Illness: 43 y.o. male admitted to [facility] to services of Ishan Kaur* .  Bilateral upper extremity wounds. As noted:   past medical history of IVDU and CHF who presented with fevers, chills and malaise.  ID consulted for \"cellulitis, IV drug use\".     Patient reports he has chronic pain in his upper extremities that has been ongoing for many months.  States he was feeling feverish and chills at home.  Reports that he has also felt more tired and sleepy.  Reports that he injects in his upper extremities but has had difficulty injecting due to lack of vein access.  Reports he has seen off-and-on drainage from the upper extremities.  States he injects mostly heroin.      Patient does state that he had his wound for sometimes it has been swollen some he is lost ability to find injection sites he states he was embarrassed so I took him long to come in.  Denies any elbow pain but states everything out of hurts but this has been chronic and ongoing for some time.    Allergies: No Known Allergies    Home Medications:    Current Facility-Administered Medications:     acetaminophen (TYLENOL) tablet 650 mg, 650 mg, Oral, Q4H PRN, 650 mg at 04/11/24 2248 **OR** acetaminophen (TYLENOL) 160 MG/5ML oral solution 650 mg, 650 mg, Oral, Q4H PRN **OR** acetaminophen (TYLENOL) suppository 650 mg, 650 mg, Rectal, Q4H PRN, Cristine Matos MD    sennosides-docusate (PERICOLACE) 8.6-50 MG per tablet 2 tablet, 2 tablet, Oral, BID PRN **AND** polyethylene glycol (MIRALAX) packet 17 g, 17 g, Oral, Daily PRN **AND** bisacodyl (DULCOLAX) EC tablet 5 mg, 5 mg, Oral, Daily PRN **AND** bisacodyl (DULCOLAX) suppository 10 mg, 10 mg, Rectal, " Daily PRN, Cristine Matos MD    bumetanide (BUMEX) tablet 2 mg, 2 mg, Oral, Daily, Cristine Matos MD    cholecalciferol (VITAMIN D3) tablet 5,000 Units, 5,000 Units, Oral, Daily, Cristine Matos MD    [] cloNIDine (CATAPRES) tablet 0.1 mg, 0.1 mg, Oral, 4x Daily PRN, 0.1 mg at 24 2244 **FOLLOWED BY** cloNIDine (CATAPRES) tablet 0.1 mg, 0.1 mg, Oral, 4x Daily PRN **FOLLOWED BY** [START ON 2024] cloNIDine (CATAPRES) tablet 0.1 mg, 0.1 mg, Oral, TID PRN **FOLLOWED BY** [START ON 2024] cloNIDine (CATAPRES) tablet 0.1 mg, 0.1 mg, Oral, BID PRN **FOLLOWED BY** [START ON 4/15/2024] cloNIDine (CATAPRES) tablet 0.1 mg, 0.1 mg, Oral, Once PRN, Jennifer Santoyo, APRN    gabapentin (NEURONTIN) capsule 300 mg, 300 mg, Oral, Q8H PRN, Cristine Matos MD, 300 mg at 24 1656    HYDROcodone-acetaminophen (NORCO) 5-325 MG per tablet 1 tablet, 1 tablet, Oral, Q6H PRN, Ishan Kaur MD, 1 tablet at 24 1656    hydrOXYzine (ATARAX) tablet 25 mg, 25 mg, Oral, TID PRN, Cait Manzo APRN, 25 mg at 24 0519    LORazepam (ATIVAN) tablet 1 mg, 1 mg, Oral, Q6H, Ishan Kaur MD, 1 mg at 24 1606    nicotine (NICODERM CQ) 14 MG/24HR patch 1 patch, 1 patch, Transdermal, Q24H, Cait Manzo APRN, 1 patch at 24 0830    Pharmacy to dose vancomycin, , Does not apply, Continuous PRN, Cristine Matos, MD    [COMPLETED] Insert Peripheral IV, , , Once **AND** sodium chloride 0.9 % flush 10 mL, 10 mL, Intravenous, PRN, Christiano Sanford MD    sodium chloride 0.9 % infusion, 125 mL/hr, Intravenous, Continuous, Christiano Sanford MD, Stopped at 24 1794    vancomycin (VANCOCIN) 1,000 mg in sodium chloride 0.9 % 250 mL IVPB-VTB, 1,000 mg, Intravenous, Q12H, Cristine Matos MD, Stopped at 24 1243    Current Medications:  Scheduled Meds:bumetanide, 2 mg, Oral, Daily  cholecalciferol, 5,000 Units, Oral, Daily  LORazepam,  1 mg, Oral, Q6H  nicotine, 1 patch, Transdermal, Q24H  vancomycin, 1,000 mg, Intravenous, Q12H      Continuous Infusions:Pharmacy to dose vancomycin,   sodium chloride, 125 mL/hr, Last Rate: Stopped (24 5582)      PRN Meds:.  acetaminophen **OR** acetaminophen **OR** acetaminophen    senna-docusate sodium **AND** polyethylene glycol **AND** bisacodyl **AND** bisacodyl    [] cloNIDine **FOLLOWED BY** cloNIDine **FOLLOWED BY** [START ON 2024] cloNIDine **FOLLOWED BY** [START ON 2024] cloNIDine **FOLLOWED BY** [START ON 4/15/2024] cloNIDine    gabapentin    HYDROcodone-acetaminophen    hydrOXYzine    Pharmacy to dose vancomycin    [COMPLETED] Insert Peripheral IV **AND** sodium chloride    Past Medical History:   Diagnosis Date    Anemia     Arthritis     CHF (congestive heart failure)     Renal disorder     Vitamin D deficiency        Past Surgical History:   Procedure Laterality Date    CHOLECYSTECTOMY  10/13/2009    10/13/2009--laparoscopic cholecystectomy.    FRACTURE SURGERY      KNEE ACL RECONSTRUCTION       and --anterior cruciate ligament reconstruction right knee.       Social History     Occupational History    Occupation: Works at [employer]   Tobacco Use    Smoking status: Every Day     Current packs/day: 1.50     Types: Cigarettes    Smokeless tobacco: Not on file   Vaping Use    Vaping status: Never Used   Substance and Sexual Activity    Alcohol use: No    Drug use: Yes     Types: Heroin    Sexual activity: Defer      Social History     Social History Narrative    Not on file       Family History   Problem Relation Age of Onset    Diabetes Mother         Type 2    Hyperthyroidism Father     Diabetes Maternal Grandmother         Type 2       Review of Systems:     Constitutional:  Denies fever, shaking or chills         All other pertinent positives and negatives as noted above in HPI.    Physical Exam: 43 y.o. male    Vitals:    24 0007 24 0139  04/12/24 0503 04/12/24 1426   BP: 98/58 91/53 97/54 134/78   BP Location:  Right arm Left arm Left arm   Patient Position:  Lying Lying Lying   Pulse: 76 73 65 91   Resp:  16  20   Temp:  98.3 °F (36.8 °C)  98.5 °F (36.9 °C)   TempSrc:  Oral  Axillary   SpO2:  99%  98%   Weight:       Height:         General:  Awake, alert. No acute distress.        Extremities: Bilateral lower upper extremities were evaluated for chronic wounds about the forearm just below the elbow bilaterally.  Evidence of chronic eschar versus some dead tissue.  Seems to be more chronic in nature there is no significant redness or drainage noted.  There is some fullness about the olecranon on the right especially compared to the left.  There is no redness in this area I massed on this area there is no pain for the patient.  He is able to flex and extend elbows fully as well as the wrist and fingers.  Motor and sensory intact distally.  Compartment soft compressible.        Diagnostic Tests:    Admission on 04/11/2024   Component Date Value Ref Range Status    Glucose 04/11/2024 133 (H)  65 - 99 mg/dL Final    BUN 04/11/2024 11  6 - 20 mg/dL Final    Creatinine 04/11/2024 1.17  0.76 - 1.27 mg/dL Final    Sodium 04/11/2024 139  136 - 145 mmol/L Final    Potassium 04/11/2024 3.6  3.5 - 5.2 mmol/L Final    Chloride 04/11/2024 106  98 - 107 mmol/L Final    CO2 04/11/2024 24.0  22.0 - 29.0 mmol/L Final    Calcium 04/11/2024 8.3 (L)  8.6 - 10.5 mg/dL Final    Total Protein 04/11/2024 6.1  6.0 - 8.5 g/dL Final    Albumin 04/11/2024 3.5  3.5 - 5.2 g/dL Final    ALT (SGPT) 04/11/2024 25  1 - 41 U/L Final    AST (SGOT) 04/11/2024 64 (H)  1 - 40 U/L Final    Alkaline Phosphatase 04/11/2024 129 (H)  39 - 117 U/L Final    Total Bilirubin 04/11/2024 0.4  0.0 - 1.2 mg/dL Final    Globulin 04/11/2024 2.6  gm/dL Final    A/G Ratio 04/11/2024 1.3  g/dL Final    BUN/Creatinine Ratio 04/11/2024 9.4  7.0 - 25.0 Final    Anion Gap 04/11/2024 9.0  5.0 - 15.0 mmol/L  Final    eGFR 04/11/2024 79.3  >60.0 mL/min/1.73 Final    Creatine Kinase 04/11/2024 621 (H)  20 - 200 U/L Final    Acetaminophen 04/11/2024 <5.0  0.0 - 30.0 mcg/mL Final    Ethanol 04/11/2024 <10  0 - 10 mg/dL Final    Ethanol % 04/11/2024 <0.010  % Final    Salicylate 04/11/2024 <0.3  <=30.0 mg/dL Final    WBC 04/11/2024 4.81  3.40 - 10.80 10*3/mm3 Final    RBC 04/11/2024 3.19 (L)  4.14 - 5.80 10*6/mm3 Final    Hemoglobin 04/11/2024 8.9 (L)  13.0 - 17.7 g/dL Final    Hematocrit 04/11/2024 27.7 (L)  37.5 - 51.0 % Final    MCV 04/11/2024 86.8  79.0 - 97.0 fL Final    MCH 04/11/2024 27.9  26.6 - 33.0 pg Final    MCHC 04/11/2024 32.1  31.5 - 35.7 g/dL Final    RDW 04/11/2024 13.8  12.3 - 15.4 % Final    RDW-SD 04/11/2024 43.7  37.0 - 54.0 fl Final    MPV 04/11/2024 9.1  6.0 - 12.0 fL Final    Platelets 04/11/2024 255  140 - 450 10*3/mm3 Final    Neutrophil % 04/11/2024 74.1  42.7 - 76.0 % Final    Lymphocyte % 04/11/2024 17.0 (L)  19.6 - 45.3 % Final    Monocyte % 04/11/2024 7.5  5.0 - 12.0 % Final    Eosinophil % 04/11/2024 1.0  0.3 - 6.2 % Final    Basophil % 04/11/2024 0.2  0.0 - 1.5 % Final    Immature Grans % 04/11/2024 0.2  0.0 - 0.5 % Final    Neutrophils, Absolute 04/11/2024 3.56  1.70 - 7.00 10*3/mm3 Final    Lymphocytes, Absolute 04/11/2024 0.82  0.70 - 3.10 10*3/mm3 Final    Monocytes, Absolute 04/11/2024 0.36  0.10 - 0.90 10*3/mm3 Final    Eosinophils, Absolute 04/11/2024 0.05  0.00 - 0.40 10*3/mm3 Final    Basophils, Absolute 04/11/2024 0.01  0.00 - 0.20 10*3/mm3 Final    Immature Grans, Absolute 04/11/2024 0.01  0.00 - 0.05 10*3/mm3 Final    nRBC 04/11/2024 0.0  0.0 - 0.2 /100 WBC Final    Blood Culture 04/11/2024 No growth at 24 hours   Preliminary    Blood Culture 04/11/2024 No growth at 24 hours   Preliminary    Lactate 04/11/2024 1.4  0.5 - 2.0 mmol/L Final    Procalcitonin 04/11/2024 0.18  0.00 - 0.25 ng/mL Final    proBNP 04/11/2024 319.0  0.0 - 450.0 pg/mL Final    COVID19 04/11/2024 Not Detected   Not Detected - Ref. Range Final    Influenza A PCR 04/11/2024 Not Detected  Not Detected Final    Influenza B PCR 04/11/2024 Not Detected  Not Detected Final    RSV, PCR 04/11/2024 Not Detected  Not Detected Final    Protime 04/11/2024 14.6 (H)  11.7 - 14.2 Seconds Final    INR 04/11/2024 1.12 (H)  0.90 - 1.10 Final    PTT 04/11/2024 29.8  22.7 - 35.4 seconds Final    Hepatitis B Surface Ag 04/11/2024 Non-Reactive  Non-Reactive Final    Hep A IgM 04/11/2024 Non-Reactive  Non-Reactive Final    Hep B C IgM 04/11/2024 Non-Reactive  Non-Reactive Final    Hepatitis C Ab 04/11/2024 Non-Reactive  Non-Reactive Final    HIV DUO 04/11/2024 Non-Reactive  Non-Reactive Final    Glucose 04/12/2024 148 (H)  65 - 99 mg/dL Final    BUN 04/12/2024 8  6 - 20 mg/dL Final    Creatinine 04/12/2024 0.98  0.76 - 1.27 mg/dL Final    Sodium 04/12/2024 141  136 - 145 mmol/L Final    Potassium 04/12/2024 3.6  3.5 - 5.2 mmol/L Final    Chloride 04/12/2024 110 (H)  98 - 107 mmol/L Final    CO2 04/12/2024 21.2 (L)  22.0 - 29.0 mmol/L Final    Calcium 04/12/2024 7.9 (L)  8.6 - 10.5 mg/dL Final    BUN/Creatinine Ratio 04/12/2024 8.2  7.0 - 25.0 Final    Anion Gap 04/12/2024 9.8  5.0 - 15.0 mmol/L Final    eGFR 04/12/2024 98.1  >60.0 mL/min/1.73 Final    WBC 04/12/2024 3.55  3.40 - 10.80 10*3/mm3 Final    RBC 04/12/2024 3.11 (L)  4.14 - 5.80 10*6/mm3 Final    Hemoglobin 04/12/2024 8.6 (L)  13.0 - 17.7 g/dL Final    Hematocrit 04/12/2024 26.2 (L)  37.5 - 51.0 % Final    MCV 04/12/2024 84.2  79.0 - 97.0 fL Final    MCH 04/12/2024 27.7  26.6 - 33.0 pg Final    MCHC 04/12/2024 32.8  31.5 - 35.7 g/dL Final    RDW 04/12/2024 13.4  12.3 - 15.4 % Final    RDW-SD 04/12/2024 40.9  37.0 - 54.0 fl Final    MPV 04/12/2024 9.0  6.0 - 12.0 fL Final    Platelets 04/12/2024 221  140 - 450 10*3/mm3 Final    Neutrophil % 04/12/2024 65.3  42.7 - 76.0 % Final    Lymphocyte % 04/12/2024 23.7  19.6 - 45.3 % Final    Monocyte % 04/12/2024 7.0  5.0 - 12.0 % Final     Eosinophil % 04/12/2024 3.1  0.3 - 6.2 % Final    Basophil % 04/12/2024 0.6  0.0 - 1.5 % Final    Immature Grans % 04/12/2024 0.3  0.0 - 0.5 % Final    Neutrophils, Absolute 04/12/2024 2.32  1.70 - 7.00 10*3/mm3 Final    Lymphocytes, Absolute 04/12/2024 0.84  0.70 - 3.10 10*3/mm3 Final    Monocytes, Absolute 04/12/2024 0.25  0.10 - 0.90 10*3/mm3 Final    Eosinophils, Absolute 04/12/2024 0.11  0.00 - 0.40 10*3/mm3 Final    Basophils, Absolute 04/12/2024 0.02  0.00 - 0.20 10*3/mm3 Final    Immature Grans, Absolute 04/12/2024 0.01  0.00 - 0.05 10*3/mm3 Final    nRBC 04/12/2024 0.0  0.0 - 0.2 /100 WBC Final     Lab Results (last 24 hours)       Procedure Component Value Units Date/Time    Vancomycin, Trough [[id number]] Collected: 04/12/24 1700    Specimen: Blood Updated: 04/12/24 1720    Blood Culture - Blood, Arm, Right [[id number]]  (Normal) Collected: 04/11/24 1437    Specimen: Blood from Arm, Right Updated: 04/12/24 1500     Blood Culture No growth at 24 hours    Blood Culture - Blood, Arm, Left [[id number]]  (Normal) Collected: 04/11/24 1445    Specimen: Blood from Arm, Left Updated: 04/12/24 1500     Blood Culture No growth at 24 hours    Basic Metabolic Panel [[id number]]  (Abnormal) Collected: 04/12/24 0610    Specimen: Blood Updated: 04/12/24 0642     Glucose 148 mg/dL      BUN 8 mg/dL      Creatinine 0.98 mg/dL      Sodium 141 mmol/L      Potassium 3.6 mmol/L      Chloride 110 mmol/L      CO2 21.2 mmol/L      Calcium 7.9 mg/dL      BUN/Creatinine Ratio 8.2     Anion Gap 9.8 mmol/L      eGFR 98.1 mL/min/1.73     Narrative:      GFR Normal >60  Chronic Kidney Disease <60  Kidney Failure <15      CBC Auto Differential [[id number]]  (Abnormal) Collected: 04/12/24 0610    Specimen: Blood Updated: 04/12/24 0627     WBC 3.55 10*3/mm3      RBC 3.11 10*6/mm3      Hemoglobin 8.6 g/dL      Hematocrit 26.2 %      MCV 84.2 fL      MCH 27.7 pg      MCHC 32.8 g/dL      RDW 13.4 %      RDW-SD 40.9 fl      MPV 9.0 fL       Platelets 221 10*3/mm3      Neutrophil % 65.3 %      Lymphocyte % 23.7 %      Monocyte % 7.0 %      Eosinophil % 3.1 %      Basophil % 0.6 %      Immature Grans % 0.3 %      Neutrophils, Absolute 2.32 10*3/mm3      Lymphocytes, Absolute 0.84 10*3/mm3      Monocytes, Absolute 0.25 10*3/mm3      Eosinophils, Absolute 0.11 10*3/mm3      Basophils, Absolute 0.02 10*3/mm3      Immature Grans, Absolute 0.01 10*3/mm3      nRBC 0.0 /100 WBC             Imaging:   Narrative & Impression   CT BOTH UPPER EXTREMITIES WITH IV CONTRAST     HISTORY: Injection drug use. Evaluate for abscess infection.     TECHNIQUE: CT includes axial imaging from the distal humeral metaphyses  through the wrist with IV contrast.     COMPARISON: None.     FINDINGS:  RIGHT: There is generalized edema within the subcutaneous fat about the  right distal humerus, elbow, forearm and extending to the wrist.  Edematous subcutaneous fat is greatest lateral to the distal humerus and  extends around the elbow circumferentially greatest posterior laterally.  There is cutaneous irregularity potentially due to cutaneous wound  lateral to the proximal radius. There is no CT evidence for deeper  extension into the musculature, though CT is not sensitive for myositis.  There is an olecranon bursal collection suspected to represent  infectious bursitis which exhibits peripheral enhancement and measures  2.3 x 1 x 2.4 cm. Additionally, superficial to the brachioradialis  muscle lateral to the distal humeral metaphysis there is coalescent  edema with areas of discontinuous peripheral enhancing consistent with  mild early abscess formation or phlegmonous change such as demonstrated  on axial image 167. No compelling evidence for elbow joint effusion. No  abnormal soft tissue gas.     LEFT: There are similar findings to that demonstrated on the right.  There is generalized edema in the subcutaneous fat circumferentially  about the distal humerus, elbow, forearm, wrist.  Edema appears greatest  lateral to the distal humerus and along the posterolateral elbow and  extending circumferentially about the forearm. There is cutaneous  irregularity particularly along the lateral and posterolateral forearm  suggesting skin wounds. Within the subcutaneous fat lateral to the  proximal brachioradialis muscle and anterolateral to the distal humeral  metaphysis there is coalescent edema which exhibits peripheral,  discontinuous enhancement consistent with early/developing abscess  formation or phlegmonous change.     IMPRESSION:  Generalized edema in the subcutaneous fat about both upper  extremities consistent with cellulitis. There are areas of cutaneous  irregularity suggesting cutaneous wounds. On the right, there is an  olecranon bursal collection consistent with infectious bursitis  measuring approximately 2.3 x 1.0 x 2.4 cm. Within the subcutaneous fat  lateral to the distal humeral metaphyses and brachioradialis muscles  there is coalescent edema which exhibits discontinuous peripheral  enhancement consistent with early phlegmonous change or developing  abscess formation such as demonstrated on the right on axial image 174  and on the left on axial image 167.     Radiation dose reduction techniques were utilized, including automated  exposure control and exposure modulation based on body size.        This report was finalized on 4/12/2024 12:48 PM by Dr. Primitivo Cochran M.D on Workstation: [id number]          Assessment: bilateral upper extremity chronic wounds    Plan: Discussed the findings with the patient also with the hospitalist.  Patient appears to have chronic wounds As well as some dead tissue versus eschar.  This was due to as noted above chronic heroin use.  There was comment on fluid collection in the bursal area however on exam patient is not tender in that area there is no redness in that area is full joint motion.  There is no evidence of active drainage about the  wounds.  Given the extent of soft tissue involvement wound and location this may require upper extremity specialist attention and expertise as well as plastic surgery which is out of my scope of practice.  The patient is not acutely ill or septic.  There appears to be no joint involvement.  However given the extent this patient may be best served at a facility with hand specialist/hand service and plastic surgery such as [facility].  Medicine states that they will look to transfer if able and will continue to discuss this and monitor the patient.  If there are any acute changes plans could change however continue antibiotics per infectious disease.  Please call any questions or concerns thank you    Date: 4/12/2024    Unruly Gupta MD    CC: Ishan Kaur*

## 2024-04-12 NOTE — CONSULTS
"Referring Provider: Christiano Sanford MD  Reason for Consultation: cellulitis, iv drug use   Chief Complaint   Patient presents with    Wound Check         Subjective   History of present illness: Patient is a 43-year-old male with past medical history of IVDU and CHF who presented with fevers, chills and malaise.  ID consulted for \"cellulitis, IV drug use\".    Patient reports he has chronic pain in his upper extremities that has been ongoing for many months.  States he was feeling feverish and chills at home.  Reports that he has also felt more tired and sleepy.  Reports that he injects in his upper extremities but has had difficulty injecting due to lack of vein access.  Reports he has seen off-and-on drainage from the upper extremities.  States he injects mostly heroin.    On presentation he is afebrile with no leukocytosis.  Procalcitonin and lactate are normal.  Started on cefepime and vancomycin.    Past Medical History:   Diagnosis Date    Anemia     Arthritis     CHF (congestive heart failure)     Renal disorder     Vitamin D deficiency        Past Surgical History:   Procedure Laterality Date    CHOLECYSTECTOMY  10/13/2009    10/13/2009--laparoscopic cholecystectomy.    FRACTURE SURGERY      KNEE ACL RECONSTRUCTION      1996 and 1999--anterior cruciate ligament reconstruction right knee.       family history includes Diabetes in his maternal grandmother and mother; Hyperthyroidism in his father.     reports that he has been smoking cigarettes. He does not have any smokeless tobacco history on file. He reports current drug use. Drug: Heroin. He reports that he does not drink alcohol.     No Known Allergies    Medication:  Antibiotics:  Anti-Infectives (From admission, onward)      Ordered     Dose/Rate Route Frequency Start Stop    04/11/24 2049  vancomycin (VANCOCIN) 1,000 mg in sodium chloride 0.9 % 250 mL IVPB-VTB        Ordering Provider: Cristine Matos MD    1,000 mg  250 mL/hr over 60 Minutes " "Intravenous Every 12 Hours 04/12/24 0600 04/17/24 0559    04/11/24 2040  cefTRIAXone (ROCEPHIN) 2,000 mg in sodium chloride 0.9 % 100 mL MBP        Ordering Provider: Cristine Matos MD    2,000 mg  200 mL/hr over 30 Minutes Intravenous Every 24 Hours 04/11/24 2230 04/16/24 2229    04/11/24 2040  Pharmacy to dose vancomycin        Ordering Provider: Cristine Matos MD     Does not apply Continuous PRN 04/11/24 2039 04/16/24 2038    04/11/24 1423  cefepime 2000 mg IVPB in 100 mL NS (MBP)        Ordering Provider: Christaino Sanford MD    2,000 mg  over 30 Minutes Intravenous Once 04/11/24 1439 04/11/24 1600    04/11/24 1421  Vancomycin HCl 1,250 mg in sodium chloride 0.9 % 250 mL VTB        Ordering Provider: Christiano Sanford MD    1,250 mg  200 mL/hr over 75 Minutes Intravenous Once 04/11/24 1437 04/11/24 1741              Objective     Physical Exam:   Vital Signs   Temp:  [97.4 °F (36.3 °C)-98.6 °F (37 °C)] 98.3 °F (36.8 °C)  Heart Rate:  [] 65  Resp:  [16-18] 16  BP: ()/(53-72) 97/54    GENERAL: Awake and alert, in no acute distress.   HEENT: Oropharynx is clear. Hearing is grossly normal.   EYES: PERRL. No conjunctival injection. No lid lag.   LUNGS: Normal work of breathing  GI: Soft, nontender, nondistended.   SKIN: Numerous scabbed over and necrotic areas on the upper extremities bilaterally at injection sites.  No purulence.  No real diffuse edema or erythema.  PSYCHIATRIC: Appropriate mood.    Results Review:   I reviewed the patient's new clinical results.    Lab Results   Component Value Date    WBC 3.55 04/12/2024    HGB 8.6 (L) 04/12/2024    HCT 26.2 (L) 04/12/2024    MCV 84.2 04/12/2024     04/12/2024       No results found for: \"VANCOPEAK\", \"VANCOTROUGH\", \"VANCORANDOM\"    Lab Results   Component Value Date    GLUCOSE 148 (H) 04/12/2024    BUN 8 04/12/2024    CREATININE 0.98 04/12/2024    EGFRIFAFRI 51 (L) 01/22/2023    BCR 8.2 04/12/2024    CO2 21.2 (L) 04/12/2024    CALCIUM " 7.9 (L) 04/12/2024    ALBUMIN 3.5 04/11/2024    LABIL2 0.9 (L) 01/22/2023    AST 64 (H) 04/11/2024    ALT 25 04/11/2024         Estimated Creatinine Clearance: 101.6 mL/min (by C-G formula based on SCr of 0.98 mg/dL).      Microbiology:  4/11 blood cultures in process  4/11 COVID, flu and RSV negative    Radiology:  No new radiology    Assessment     #Bilateral upper extremity wounds  #IVDU with predominantly heroin and reportedly used methadone in the past    Overall wounds appear very chronic in nature.  I do not see any current purulent drainage.  Suspect he may be withdrawing and that may be precipitating some of his symptoms.  Can continue vancomycin goal -600.  Recommend CT upper extremities to evaluate for any abscess.  Follow-up blood cultures.       Thank you for this consult.  We will continue to follow along and tailor antibiotics as the patient's clinical course evolves.

## 2024-04-12 NOTE — PROGRESS NOTES
"    Name: Ryan Marina ADMIT: 2024   : 1980  PCP: Primitivo Link MD    MRN: [id number] LOS: 0 days   AGE/SEX: 43 y.o. male  ROOM: [facility]     Subjective   Subjective   Patient complains of diffuse body pain.  Says it is the worst pain he has ever felt.  He reports that he uses 3 to 4 g of fentanyl daily and as much as \"20 g of Xanax\".  He was just found with cigarettes in his bed and is upset with security for coming in and confiscating them and is considering leaving AMA.    Reports an episode of short time ago of inability to respond for several minutes.  He thinks it was maybe 20 minutes long.  He reports that he wanted to call out but could not.  He says he could not move any of his extremities.       Objective   Objective   Vital Signs  Temp:  [97.4 °F (36.3 °C)-98.5 °F (36.9 °C)] 98.5 °F (36.9 °C)  Heart Rate:  [] 91  Resp:  [16-20] 20  BP: ()/(53-78) 134/78  SpO2:  [98 %-100 %] 98 %  on   ;   Device (Oxygen Therapy): room air  Body mass index is 22.1 kg/m².  Physical Exam  Vitals reviewed.   Constitutional:       General: He is not in acute distress.  HENT:      Head: Normocephalic and atraumatic.   Eyes:      General: No scleral icterus.  Neck:      Vascular: No JVD.   Cardiovascular:      Rate and Rhythm: Normal rate and regular rhythm.      Heart sounds: No murmur heard.  Pulmonary:      Effort: Pulmonary effort is normal. No respiratory distress.      Breath sounds: No wheezing.   Abdominal:      General: There is no distension.      Palpations: Abdomen is soft.      Tenderness: There is no abdominal tenderness.   Musculoskeletal:      Right lower leg: No edema.      Left lower leg: No edema.      Comments: Tenderness at the right olecranon bursa.  Slight warmth compared to the left.   Skin:     General: Skin is warm and dry.      Comments: Innumerable track marks on both arms bilaterally, most covered with eschar.  There is some mild erythema but nothing bright red. " "  Neurological:      General: No focal deficit present.      Mental Status: He is alert and oriented to person, place, and time.   Psychiatric:      Comments: Irritable and anxious.  Inappropriate language       Results Review     I reviewed the patient's new clinical results.  Results from last 7 days   Lab Units 04/12/24  0610 04/11/24  1437   WBC 10*3/mm3 3.55 4.81   HEMOGLOBIN g/dL 8.6* 8.9*   PLATELETS 10*3/mm3 221 255     Results from last 7 days   Lab Units 04/12/24  0610 04/11/24  1437   SODIUM mmol/L 141 139   POTASSIUM mmol/L 3.6 3.6   CHLORIDE mmol/L 110* 106   CO2 mmol/L 21.2* 24.0   BUN mg/dL 8 11   CREATININE mg/dL 0.98 1.17   GLUCOSE mg/dL 148* 133*   EGFR mL/min/1.73 98.1 79.3     Results from last 7 days   Lab Units 04/11/24  1437   ALBUMIN g/dL 3.5   BILIRUBIN mg/dL 0.4   ALK PHOS U/L 129*   AST (SGOT) U/L 64*   ALT (SGPT) U/L 25     Results from last 7 days   Lab Units 04/12/24  0610 04/11/24  1437   CALCIUM mg/dL 7.9* 8.3*   ALBUMIN g/dL  --  3.5     Results from last 7 days   Lab Units 04/11/24  1437   PROCALCITONIN ng/mL 0.18   LACTATE mmol/L 1.4     No results found for: \"HGBA1C\", \"POCGLU\"    CT Upper Extremity Left With Contrast    Result Date: 4/12/2024  Generalized edema in the subcutaneous fat about both upper extremities consistent with cellulitis. There are areas of cutaneous irregularity suggesting cutaneous wounds. On the right, there is an olecranon bursal collection consistent with infectious bursitis measuring approximately 2.3 x 1.0 x 2.4 cm. Within the subcutaneous fat lateral to the distal humeral metaphyses and brachioradialis muscles there is coalescent edema which exhibits discontinuous peripheral enhancement consistent with early phlegmonous change or developing abscess formation such as demonstrated on the right on axial image 174 and on the left on axial image 167.  Radiation dose reduction techniques were utilized, including automated exposure control and exposure " modulation based on body size.   This report was finalized on 4/12/2024 12:48 PM by Dr. Primitivo Cochran M.D on Workstation: [id number]       I have personally reviewed all medications:  Scheduled Medications  bumetanide, 2 mg, Oral, Daily  cholecalciferol, 5,000 Units, Oral, Daily  LORazepam, 1 mg, Oral, Q6H  nicotine, 1 patch, Transdermal, Q24H  vancomycin, 1,000 mg, Intravenous, Q12H    Infusions  Pharmacy to dose vancomycin,   sodium chloride, 125 mL/hr, Last Rate: Stopped (04/11/24 4956)    Diet  Diet: Regular/House; Fluid Consistency: Thin (IDDSI 0)    I have personally reviewed:  [x]  Laboratory   [x]  Microbiology   [x]  Radiology   []  EKG/Telemetry  []  Cardiology/Vascular   []  Pathology    []  Records       Assessment/Plan     Active Hospital Problems    Diagnosis  POA    **Cellulitis of arm [L03.119]  Yes    Opioid use disorder, severe, dependence [F11.20]  Yes    Benzodiazepine abuse [F13.10]  Yes    Anemia, chronic disease [D63.8]  Unknown    CKD (chronic kidney disease) stage 2, GFR 60-89 ml/min [N18.2]  Unknown      Resolved Hospital Problems   No resolved problems to display.       43 y.o. male with longstanding IV drug use and polysubstance abuse along with history of C3 nephropathy admitted with bilateral arm cellulitis.    Appreciate ID recommendations.  Continue vancomycin.  Reviewed CT of the extremities which does show possible early developing abscess/phlegmon.  ID has consulted orthopedics, await recommendations.    Not sure what to make of his episode he describes earlier of staring and inability to move or speak.  Given his apparent severe benzodiazepine use, certainly would be at risk for seizures.  He was concerned it could be a stroke but I do not think so (or TIA) based on his description.  His exam currently is nonfocal.  Will ask neuro to eval    He is on clonidine protocol for opiate withdrawal.  Given this question of potential seizure with history of benzodiazepine use he  probably should be on Ativan taper as well so I have ordered this.    Pain control obviously an issue.  Given this potential abscess I wrote for low-dose Norco but would try not to escalate this any further.  Would not recommend IV pain medication and will not prescribe at discharge.  [facility] has seen and patient has expressed little desire to pursue any treatment options.  He told me the same, says he has been to rehab 20 times it does not work.  He says if he was discharged right now he would go shoot up as soon as possible.    Very poor overall prognosis.  Given his agitation I would not be surprised if he decides to leave [facility].        Ishan Kaur MD  [facility]  04/12/24  16:08 EDT

## 2024-04-12 NOTE — NURSING NOTE
Wound/ostomy - consult received regarding wounds to BUE present on admission. Patient is an IVDU and injects into subcu tissue due to poor vein access. ID has seen and did not mention any need for wound care, was going to check CT for abscess. Patient has been seen by [facility] and no plan for [facility] treatment. Per chart, patient continually remarks that he wants to leave the hospital and is resistant to care at times, irritable.     Patient asleep, but easily awakens to sound. Patient with scattered dry scabs to BUE, R has more than L, no drainage, appear chronic in nature. Scabs/eschar are stable and are providing a protective barrier to any open wounds. To heal these sites they would need to be debrided, wither surgically or enzymatically and then focus on active topical wound therapy. Given patient's history of inadequate self and medical care and no plans to stop using substances by injection, it would not be of benefit to initiate any type of wound care. Painting with betadine would be the best option to provide some antibacterial protection, but patient not likely to be compliant or cooperative with that.

## 2024-04-12 NOTE — PROGRESS NOTES
Discharge Planning Assessment  [facility]     Patient Name: Ryan Marina  MRN: [id number]  Today's Date: 4/12/2024    Admit Date: 4/11/2024        Discharge Needs Assessment    No documentation.                  Discharge Plan       Row Name 04/12/24 2396       Plan    Plan Comments The patient transferred to [facility] from ER on 4/11/24 @ 18:10. Up ad aj and alert and oriented. CCP will follow for any discharge needs. TARSHA Duncan RN, CCP.                  Continued Care and Services - Admitted Since 4/11/2024    No active coordination exists for this encounter.       Expected Discharge Date and Time       Expected Discharge Date Expected Discharge Time    Apr 15, 2024            Demographic Summary    No documentation.                  Functional Status    No documentation.                  Psychosocial    No documentation.                  Abuse/Neglect    No documentation.                  Legal    No documentation.                  Substance Abuse    No documentation.                  Patient Forms    No documentation.                     Jie Duncan, RN

## 2024-04-12 NOTE — PROGRESS NOTES
"[facility] Clinical Pharmacy Services: Vancomycin Pharmacokinetic Initial Consult Note    Ryan Marina is a 43 y.o. male who is on day 1 of pharmacy to dose vancomycin.    Indication: Skin and Soft Tissue  Consulting Provider: Dr. Matos  Planned Duration of Therapy: 5 days  Loading Dose Ordered or Given: 1250 mg on 4/11 at 1600  MRSA PCR performed: no  Culture/Source:   4/11 blood cx x 2 pending  Target: -600 mg/L.hr   Other Antimicrobials: ceftriaxone    Vitals/Labs  Ht: 182.9 cm (72\"); Wt: 73.9 kg (162 lb 14.7 oz)  Temp Readings from Last 1 Encounters:   04/11/24 98.1 °F (36.7 °C) (Oral)    Estimated Creatinine Clearance: 85.1 mL/min (by C-G formula based on SCr of 1.17 mg/dL).        Results from last 7 days   Lab Units 04/11/24  1437   CREATININE mg/dL 1.17   WBC 10*3/mm3 4.81     Assessment/Plan:    Vancomycin Dose:   1000 mg IV every  12  hours  Predictive AUC level for the dose ordered is 589 mg/L.hr, which is within the target of 400-600 mg/L.hr  No level ordered, will defer to AM clinical pharmacist for level ordering pending clinical course.     Pharmacy will follow patient's kidney function and will adjust doses and obtain levels as necessary. Thank you for involving pharmacy in this patient's care. Please contact pharmacy with any questions or concerns.                           Nieves Macias, PharmD  Clinical Pharmacist    "

## 2024-04-12 NOTE — PROGRESS NOTES
"[facility] Clinical Pharmacy Services: Vancomycin Monitoring Note    Ryan Marian is a 43 y.o. male who is on day 1/5 of pharmacy to dose vancomycin for Skin and Soft Tissue.    Previous Vancomycin Dose:   1000 mg IV every  12  hours  Updated Cultures and Sensitivities:   4/11 bcx: In process  Results from last 7 days   Lab Units 04/12/24  1700   VANCOMYCIN TR mcg/mL 12.50     Vitals/Labs  Ht: 182.9 cm (72\"); Wt: 73.9 kg (162 lb 14.7 oz)   Temp Readings from Last 1 Encounters:   04/12/24 98.5 °F (36.9 °C) (Axillary)     Estimated Creatinine Clearance: 101.6 mL/min (by C-G formula based on SCr of 0.98 mg/dL).       Results from last 7 days   Lab Units 04/12/24  0610 04/11/24  1437   CREATININE mg/dL 0.98 1.17   WBC 10*3/mm3 3.55 4.81     Assessment/Plan  Level came back at <20, so okay to re-dose but predicted AUC for previous regimen was showing 577 - will lower dose a bit and get another level tomorrow to verify    Current Vancomycin Dose: 750 mg IV every  12  hours; provides a predicted  mg/L.hr   Next Level Date and Time: Vanc Trough on 4/13 at 0530  We will continue to monitor patient changes and renal function     Thank you for involving pharmacy in this patient's care. Please contact pharmacy with any questions or concerns.       Jessy Roa, [facility]  Clinical Pharmacist            "

## 2024-04-12 NOTE — H&P
HISTORY AND PHYSICAL   [facility]        Date of Admission: 2024  Patient Identification:  Name: Ryan Marina  Age: 43 y.o.  Sex: male  :  1980  MRN: [id number]                     Primary Care Physician: Primitivo Link MD    Chief Complaint:  43 year old gentleman presented to the emergency room with fever, chills and malaise for a week; he has also had redness and multiple sores on his right arm; he has a history of heroin use but has not been able to find any veins to inject; he has had purulent drainage from both arms and has had decreased appetite and malaise    History of Present Illness:   As above    Past Medical History:  Past Medical History:   Diagnosis Date    Anemia     Arthritis     CHF (congestive heart failure)     Renal disorder     Vitamin D deficiency      Past Surgical History:  Past Surgical History:   Procedure Laterality Date    CHOLECYSTECTOMY  10/13/2009    10/13/2009--laparoscopic cholecystectomy.    FRACTURE SURGERY      KNEE ACL RECONSTRUCTION       and --anterior cruciate ligament reconstruction right knee.      Home Meds:  Medications Prior to Admission   Medication Sig Dispense Refill Last Dose    bumetanide (BUMEX) 2 MG tablet Take 1 tablet by mouth.       Cholecalciferol (VITAMIN D3) 5000 UNITS capsule capsule Take 1 capsule by mouth Daily.       methadone (DOLOPHINE) 5 MG tablet Take 24 tablets by mouth Daily.       mycophenolate (CELLCEPT) 500 MG tablet Take  by mouth 2 (Two) Times a Day.       predniSONE (DELTASONE) 20 MG tablet Take 1 tablet by mouth Daily.          Allergies:  No Known Allergies  Immunizations:  Immunization History   Administered Date(s) Administered    COVID-19 (MODERNA) 1st,2nd,3rd Dose Monovalent 2021, 2021     Social History:   Social History     Social History Narrative    Not on file     Social History     Socioeconomic History    Marital status: Single   Tobacco Use    Smoking status: Every Day      "Current packs/day: 1.50     Types: Cigarettes   Vaping Use    Vaping status: Never Used   Substance and Sexual Activity    Alcohol use: No    Drug use: Yes     Types: Heroin    Sexual activity: Defer       Family History:  Family History   Problem Relation Age of Onset    Diabetes Mother         Type 2    Hyperthyroidism Father     Diabetes Maternal Grandmother         Type 2        Review of Systems  See history of present illness and past medical history.  Patient denies headache, dizziness, syncope, falls, trauma, change in vision, change in hearing, change in taste, changes in weight, changes in appetite, focal weakness, numbness, or paresthesia.  Patient denies chest pain, palpitations, dyspnea, orthopnea, PND, cough, sinus pressure, rhinorrhea, epistaxis, hemoptysis, nausea, vomiting,hematemesis, diarrhea, constipation or hematochezia.  Denies cold or heat intolerance, polydipsia, polyuria, polyphagia. Denies hematuria, pyuria, dysuria, hesitancy, frequency or urgency. Denies consumption of raw and under cooked meats foods or change in water source.  Denies fever, chills, sweats, night sweats.  Denies missing any routine medications. Remainder of ROS is negative.    Objective:  T Max 24 hrs: Temp (24hrs), Av °F (36.7 °C), Min:97.4 °F (36.3 °C), Max:98.6 °F (37 °C)    Vitals Ranges:   Temp:  [97.4 °F (36.3 °C)-98.6 °F (37 °C)] 97.4 °F (36.3 °C)  Heart Rate:  [81-91] 81  Resp:  [18] 18  BP: (106-132)/(59-72) 119/72      Exam:  /72 (BP Location: Left arm, Patient Position: Lying)   Pulse 81   Temp 97.4 °F (36.3 °C) (Oral)   Resp 18   Ht 182.9 cm (72\")   Wt 73.9 kg (162 lb 14.7 oz)   SpO2 100%   BMI 22.10 kg/m²     General Appearance:    Alert, cooperative, no distress, appears stated age   Head:    Normocephalic, without obvious abnormality, atraumatic   Eyes:    PERRL, conjunctivae/corneas clear, EOM's intact, both eyes   Ears:    Normal external ear canals, both ears   Nose:   Nares normal, " septum midline, mucosa normal, no drainage    or sinus tenderness   Throat:   Lips, mucosa, and tongue normal   Neck:   Supple, symmetrical, trachea midline, no adenopathy;     thyroid:  no enlargement/tenderness/nodules; no carotid    bruit or JVD   Back:     Symmetric, no curvature, ROM normal, no CVA tenderness   Lungs:     Decreased breath sounds bilaterally, respirations unlabored   Chest Wall:    No tenderness or deformity    Heart:    Regular rate and rhythm, S1 and S2 normal, no murmur, rub   or gallop   Abdomen:     Soft, nontender, bowel sounds active all four quadrants,     no masses, no hepatomegaly, no splenomegaly   Extremities:   Extremities normal, atraumatic, both arms with erythema, eschars                       .    Data Review:  Labs in chart were reviewed.  WBC   Date Value Ref Range Status   04/11/2024 4.81 3.40 - 10.80 10*3/mm3 Final     Hemoglobin   Date Value Ref Range Status   04/11/2024 8.9 (L) 13.0 - 17.7 g/dL Final     Hematocrit   Date Value Ref Range Status   04/11/2024 27.7 (L) 37.5 - 51.0 % Final     Platelets   Date Value Ref Range Status   04/11/2024 255 140 - 450 10*3/mm3 Final     Sodium   Date Value Ref Range Status   04/11/2024 139 136 - 145 mmol/L Final     Potassium   Date Value Ref Range Status   04/11/2024 3.6 3.5 - 5.2 mmol/L Final     Chloride   Date Value Ref Range Status   04/11/2024 106 98 - 107 mmol/L Final     CO2   Date Value Ref Range Status   04/11/2024 24.0 22.0 - 29.0 mmol/L Final     BUN   Date Value Ref Range Status   04/11/2024 11 6 - 20 mg/dL Final     Creatinine   Date Value Ref Range Status   04/11/2024 1.17 0.76 - 1.27 mg/dL Final     Glucose   Date Value Ref Range Status   04/11/2024 133 (H) 65 - 99 mg/dL Final     Calcium   Date Value Ref Range Status   04/11/2024 8.3 (L) 8.6 - 10.5 mg/dL Final                Imaging Results (All)       None              Assessment:  Active Hospital Problems    Diagnosis  POA    **Cellulitis of arm [L03.119]  Yes       Resolved Hospital Problems   No resolved problems to display.   Iv drug use  Hyperglycemia  anemia    Plan:  Will continue antibiotics  Trend labs  Check echo  Ask id to see him  Prn gabapentin   Dw patient and ed provider    Cristine Matos MD  4/11/2024  20:35 EDT    Was planning to reorder methadone which he says he took yesterday but the family told staff that he went crazy on it before so will hold off for tonight

## 2024-04-12 NOTE — THERAPY EVALUATION
Patient Name: Ryan Marina  : 1980    MRN: [id number]                              Today's Date: 2024       Admit Date: 2024    Visit Dx:     ICD-10-CM ICD-9-CM   1. Cellulitis of left arm  L03.114 682.3   2. Cellulitis of right arm  L03.113 682.3   3. IV drug abuse  F19.10 305.90   4. Chronic anemia  D64.9 285.9   5. Non-traumatic rhabdomyolysis  M62.82 728.88   6. 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    FRACTURE SURGERY      KNEE ACL RECONSTRUCTION       and --anterior cruciate ligament reconstruction right knee.      General Information       Row Name 24 1334          Physical Therapy Time and Intention    Document Type evaluation  -CW     Mode of Treatment physical therapy  -CW       Row Name 24 2762          General Information    Patient Profile Reviewed yes  -CW     Prior Level of Function independent:  reports using single axillary crutch due to BLE swelling and ankle pain  -CW     Existing Precautions/Restrictions fall  -CW     Barriers to Rehab medically complex  -CW       Row Name 24 1950          Living Environment    People in Home alone  -CW       Row Name 24 1339          Home Main Entrance    Number of Stairs, Main Entrance  none  -CW       Row Name 04/12/24 1335          Cognition    Orientation Status (Cognition) oriented x 3  -CW       Row Name 04/12/24 1335          Safety Issues, Functional Mobility    Impairments Affecting Function (Mobility) balance;coordination;endurance/activity tolerance;strength  -CW               User Key  (r) = Recorded By, (t) = Taken By, (c) = Cosigned By      Initials Name Provider Type    CW Anamaria Cazares PT Physical Therapist                   Mobility       Row Name 04/12/24 1524          Bed Mobility    Bed Mobility supine-sit;sit-supine  -CW     Supine-Sit [place] (Bed Mobility) standby assist  -CW     Sit-Supine [place] (Bed Mobility) standby assist  -CW     Assistive Device (Bed Mobility) head of bed elevated  -CW       Row Name 04/12/24 1524          Sit-Stand Transfer    Sit-Stand [place] (Transfers) contact guard  -CW     Assistive Device (Sit-Stand Transfers) crutches, axillary  -CW     Comment, (Sit-Stand Transfer) 1 crutch on R  -CW       Row Name 04/12/24 1524          Gait/Stairs (Locomotion)    [place] Level (Gait) verbal cues;contact guard  -CW     Assistive Device (Gait) crutches, axillary  -CW     Distance in Feet (Gait) 25  -CW     Deviations/Abnormal Patterns (Gait) praveen decreased;gait speed decreased  -CW     Comment, (Gait/Stairs) 1 loss of balance initially  -CW               User Key  (r) = Recorded By, (t) = Taken By, (c) = Cosigned By      Initials Name Provider Type    CW Anamaria Cazares PT Physical Therapist                   Obj/Interventions       Row Name 04/12/24 1525          Range of Motion Comprehensive    General Range of Motion bilateral lower extremity ROM WFL  -CW       Row Name 04/12/24 1525          Strength Comprehensive (MMT)    Comment, General Manual Muscle Testing (MMT) Assessment Generalized weakness noted  -CW       Row Name 04/12/24 1525          Balance    Balance Assessment standing static balance;standing dynamic balance   -CW     Static Sitting Balance standby assist  -CW     Dynamic Sitting Balance standby assist  -CW     Position, Sitting Balance sitting edge of bed  -CW       Row Name 04/12/24 1525          Sensory Assessment (Somatosensory)    Sensory Assessment (Somatosensory) sensation intact  -CW               User Key  (r) = Recorded By, (t) = Taken By, (c) = Cosigned By      Initials Name Provider Type    Anamaria Marrufo PT Physical Therapist                   Goals/Plan       Row Name 04/12/24 1533          Bed Mobility Goal 1 (PT)    Activity/Assistive Device (Bed Mobility Goal 1, PT) bed mobility activities, all  -CW     [place] Level/Cues Needed (Bed Mobility Goal 1, PT) modified independence  -CW     Time Frame (Bed Mobility Goal 1, PT) 1 week  -CW       Row Name 04/12/24 1533          Transfer Goal 1 (PT)    Activity/Assistive Device (Transfer Goal 1, PT) sit-to-stand/stand-to-sit;bed-to-chair/chair-to-bed  -CW     [place] Level/Cues Needed (Transfer Goal 1, PT) modified independence  -CW     Time Frame (Transfer Goal 1, PT) 1 week  -CW       Row Name 04/12/24 1533          Gait Training Goal 1 (PT)    Activity/Assistive Device (Gait Training Goal 1, PT) gait (walking locomotion);walker, rolling  -CW     [place] Level (Gait Training Goal 1, PT) standby assist  -CW     Distance (Gait Training Goal 1, PT) 250'  -CW     Time Frame (Gait Training Goal 1, PT) 1 week  -CW       Row Name 04/12/24 1533          Therapy Assessment/Plan (PT)    Planned Therapy Interventions (PT) balance training;bed mobility training;gait training;ROM (range of motion);strengthening;patient/family education;transfer training  -CW               User Key  (r) = Recorded By, (t) = Taken By, (c) = Cosigned By      Initials Name Provider Type    Anamaria Marrufo PT Physical Therapist                   Clinical Impression       Row Name 04/12/24 1526          Pain    Pretreatment Pain Rating 7/10  -CW     Posttreatment Pain  "Rating 8/10  -CW     Pain Location generalized  -CW     Pain Intervention(s) Repositioned;Rest;Ambulation/increased activity  -CW       Row Name 04/12/24 1526          Plan of Care Review    Plan of Care Reviewed With patient  -CW     Outcome Evaluation Pt is a 42 yo male admitted with cellultis and wounds on B arms. Pt reports living alone and uses an axillary crutch due to ankle pain and BLE weakness intermittantly. Pt completed bed mobility with sba, transfers with cga and ambulated 25' cga with his axillary crutch. Pt had one loss of balance and reports 7/10 pain \"all over\" at rest and with mobility. P pierre benefit from ongoing skilled PT services while inpatient to address balance deficits although pt reports he is close to his baseline level. He reports numerous falls at home. Pt plans home at d/c.  -CW       Row Name 04/12/24 1526          Therapy Assessment/Plan (PT)    Rehab Potential (PT) good, to achieve stated therapy goals  -CW     Criteria for Skilled Interventions Met (PT) yes  -CW     Therapy Frequency (PT) 5 times/wk  -CW       Row Name 04/12/24 1526          Vital Signs    O2 Delivery Pre Treatment room air  -CW       Row Name 04/12/24 1526          Positioning and Restraints    Pre-Treatment Position in bed  -CW     Post Treatment Position bed  -CW     In Bed notified nsg;call light within reach;encouraged to call for assist;exit alarm on;fowlers  -CW               User Key  (r) = Recorded By, (t) = Taken By, (c) = Cosigned By      Initials Name Provider Type    CW Anamaria Cazares, PT Physical Therapist                   Outcome Measures       Row Name 04/12/24 1535 04/12/24 0837       How much help from another person do you currently need...    Turning from your back to your side while in flat bed without using bedrails? 4  -CW 4  -LD    Moving from lying on back to sitting on the side of a flat bed without bedrails? 4  -CW 4  -LD    Moving to and from a bed to a chair (including a " wheelchair)? 3  -CW 4  -LD    Standing up from a chair using your arms (e.g., wheelchair, bedside chair)? 3  -CW 4  -LD    Climbing 3-5 steps with a railing? 3  -CW 4  -LD    To walk in hospital room? 3  -CW 4  -LD    AM-PAC 6 Clicks Score (PT) 20  -CW 24  -LD    Highest Level of Mobility Goal 6 --> Walk 10 steps or more  -CW 8 --> Walked 250 feet or more  -LD      Row Name 04/12/24 1535 04/12/24 1318       Functional Assessment    Outcome Measure Options AM-PAC 6 Clicks Basic Mobility (PT)  -CW AM-PAC 6 Clicks Daily Activity (OT)  -BS              User Key  (r) = Recorded By, (t) = Taken By, (c) = Cosigned By      Initials Name Provider Type    LD Criss Thomson, RN Registered Nurse    Anamaria Marrufo, PT Physical Therapist    BS Yuliana Sanford, OT Occupational Therapist                                 Physical Therapy Education       Title: PT OT SLP Therapies (In Progress)       Topic: Physical Therapy (In Progress)       Point: Mobility training (Done)       Learning Progress Summary             Patient Acceptance, E, VU by  at 4/12/2024 1536                         Point: Home exercise program (Not Started)       Learner Progress:  Not documented in this visit.              Point: Body mechanics (Not Started)       Learner Progress:  Not documented in this visit.              Point: Precautions (Not Started)       Learner Progress:  Not documented in this visit.                              User Key       Initials Effective Dates Name Provider Type Discipline     12/13/22 -  Anamaria Cazares, PT Physical Therapist PT                  PT Recommendation and Plan  Planned Therapy Interventions (PT): balance training, bed mobility training, gait training, ROM (range of motion), strengthening, patient/family education, transfer training  Plan of Care Reviewed With: patient  Outcome Evaluation: Pt is a 42 yo male admitted with cellultis and wounds on B arms. Pt reports living alone and uses an axillary crutch  "due to ankle pain and BLE weakness intermittantly. Pt completed bed mobility with sba, transfers with cga and ambulated 25' cga with his axillary crutch. Pt had one loss of balance and reports 7/10 pain \"all over\" at rest and with mobility. P pierre benefit from ongoing skilled PT services while inpatient to address balance deficits although pt reports he is close to his baseline level. He reports numerous falls at home. Pt plans home at d/c.     Time Calculation:         PT Charges       Row Name 04/12/24 1335             Time Calculation    Start Time 1057  -CW      Stop Time 1110  -CW      Time Calculation (min) 13 min  -CW      PT Received On 04/12/24  -CW      PT - Next Appointment 04/15/24  -CW      PT Goal Re-Cert Due Date 04/19/24  -CW                User Key  (r) = Recorded By, (t) = Taken By, (c) = Cosigned By      Initials Name Provider Type    CW Anamaria Cazares, PT Physical Therapist                  Therapy Charges for Today       Code Description Service Date Service Provider Modifiers Qty    [id number]  PT EVAL MOD COMPLEXITY 2 4/12/2024 Anamaria Cazares, PT GP 1            PT G-Codes  Outcome Measure Options: AM-PAC 6 Clicks Basic Mobility (PT)  AM-PAC 6 Clicks Score (PT): 20  AM-PAC 6 Clicks Score (OT): 23  PT Discharge Summary  Anticipated Discharge Disposition (PT): home with assist, home with home health    Anamaria Cazares PT  4/12/2024    "

## 2024-04-12 NOTE — PROGRESS NOTES
"[facility] Clinical Pharmacy Services: Vancomycin Monitoring Note    Ryan Marina is a 43 y.o. male who is on day 1/5 of pharmacy to dose vancomycin for Skin and Soft Tissue.    Previous Vancomycin Dose:   1000 mg IV every  12  hours  Updated Cultures and Sensitivities:   4/11 bcx: In process  Results from last 7 days   Lab Units 04/12/24  1700   VANCOMYCIN TR mcg/mL 12.50     Vitals/Labs  Ht: 182.9 cm (72\"); Wt: 73.9 kg (162 lb 14.7 oz)   Temp Readings from Last 1 Encounters:   04/12/24 98.5 °F (36.9 °C) (Axillary)     Estimated Creatinine Clearance: 101.6 mL/min (by C-G formula based on SCr of 0.98 mg/dL).       Results from last 7 days   Lab Units 04/12/24  0610 04/11/24  1437   CREATININE mg/dL 0.98 1.17   WBC 10*3/mm3 3.55 4.81     Assessment/Plan  Level came back at <20, so okay to re-dose but predicted AUC for previous regimen was showing 577 - will lower dose a bit and get another level tomorrow to verify    Current Vancomycin Dose: 750 mg IV every  12  hours; provides a predicted  mg/L.hr   Next Level Date and Time: Vanc Trough on 4/13 at 0530  We will continue to monitor patient changes and renal function     Thank you for involving pharmacy in this patient's care. Please contact pharmacy with any questions or concerns.       Jessy Roa, [facility]  Clinical Pharmacist            "

## 2024-04-12 NOTE — PLAN OF CARE
"Goal Outcome Evaluation:  Plan of Care Reviewed With: patient           Outcome Evaluation: Pt is a 44 yo male admitted with cellultis and wounds on B arms. Pt reports living alone and uses an axillary crutch due to ankle pain and BLE weakness intermittantly. Pt completed bed mobility with sba, transfers with cga and ambulated 25' cga with his axillary crutch. Pt had one loss of balance and reports 7/10 pain \"all over\" at rest and with mobility. P tmay benefit from ongoing skilled PT services while inpatient to address balance deficits although pt reports he is close to his baseline level. He reports numerous falls at home. Pt plans home at d/c.      Anticipated Discharge Disposition (PT): home with assist, home with home health                        "

## 2024-04-12 NOTE — PLAN OF CARE
Goal Outcome Evaluation:              Outcome Evaluation: 43/M admitted for Cellulitis. pt resting most of day. CT done. Hx of IV drug use. Voiding per [facility] stnadby assist. Will continue to monitor.
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
## 2024-04-12 NOTE — PROGRESS NOTES
"[facility] Clinical Pharmacy Services: Vancomycin Monitoring Note    Ryan Marina is a 43 y.o. male who is on day 1/5 of pharmacy to dose vancomycin for Skin and Soft Tissue.    Previous Vancomycin Dose:   1000 mg IV every  12  hours  Updated Cultures and Sensitivities:   4/11 bcx: In process      Vitals/Labs  Ht: 182.9 cm (72\"); Wt: 73.9 kg (162 lb 14.7 oz)   Temp Readings from Last 1 Encounters:   04/12/24 98.3 °F (36.8 °C) (Oral)     Estimated Creatinine Clearance: 101.6 mL/min (by C-G formula based on SCr of 0.98 mg/dL).       Results from last 7 days   Lab Units 04/12/24  0610 04/11/24  1437   CREATININE mg/dL 0.98 1.17   WBC 10*3/mm3 3.55 4.81     Assessment/Plan  Patient does have history of C3 nephropathy and CKD III. Typical baseline Scr is 1.5-1.6. Scr currently lower than that so given the history I think it is best to be conservative and check a level prior to the next dose to ensure he is not supra therapeutic.     Current Vancomycin Dose: 1000 mg IV every  12  hours; provides a predicted  mg/L.hr   Next Level Date and Time: Vanc Trough on 4/12 at 1730  We will continue to monitor patient changes and renal function     Thank you for involving pharmacy in this patient's care. Please contact pharmacy with any questions or concerns.       Fabian Miner [facility]  Clinical Pharmacist          "

## 2024-04-12 NOTE — THERAPY EVALUATION
Patient Name: Ryan Marina  : 1980    MRN: [id number]                              Today's Date: 2024       Admit Date: 2024    Visit Dx:     ICD-10-CM ICD-9-CM   1. Cellulitis of left arm  L03.114 682.3   2. Cellulitis of right arm  L03.113 682.3   3. IV drug abuse  F19.10 305.90   4. Chronic anemia  D64.9 285.9   5. Non-traumatic rhabdomyolysis  M62.82 728.88   6. 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    FRACTURE SURGERY      KNEE ACL RECONSTRUCTION       and --anterior cruciate ligament reconstruction right knee.      General Information       Row Name 24 1219          OT Time and Intention    Document Type evaluation;discharge evaluation/summary  -BS     Mode of Treatment individual therapy;occupational therapy  -BS       Row Name 24 1219          General Information    Patient Profile Reviewed yes  -BS     Prior Level of Function independent:;ADL's  -BS     Barriers to Rehab medically complex  PMH of IV drug use.  -BS       Row Name 24 1219          Occupational Profile    Environmental Supports and Barriers (Occupational Profile) TM reports he lives alone on the main floor of his home with no DME besides crutch  -BS       Row Name 24  1219          Living Environment    People in Home alone  -BS       Row Name 04/12/24 1219          Cognition    Orientation Status (Cognition) oriented x 3  -BS       Row Name 04/12/24 1219          Safety Issues, Functional Mobility    Safety Issues Affecting Function (Mobility) impulsivity;awareness of need for assistance  -BS     Impairments Affecting Function (Mobility) balance;coordination;endurance/activity tolerance  -BS               User Key  (r) = Recorded By, (t) = Taken By, (c) = Cosigned By      Initials Name Provider Type    BS Yuliana Sanford OT Occupational Therapist                     Mobility/ADL's       Row Name 04/12/24 1227          Bed Mobility    Bed Mobility bed mobility (all) activities  -BS     All Activities, [place] (Bed Mobility) standby assist  -BS     Comment, (Bed Mobility) Increased time and effort. Pt asleep upon arrival presenting with increased lethargy  -BS       Row Name 04/12/24 1227          Transfers    Transfers sit-stand transfer;stand-sit transfer  -BS       Row Name 04/12/24 1227          Sit-Stand Transfer    Sit-Stand [place] (Transfers) contact guard  -BS     Assistive Device (Sit-Stand Transfers) crutches, axillary  -BS     Comment, (Sit-Stand Transfer) 1 Cructch (RUE)  -BS       Row Name 04/12/24 1227          Stand-Sit Transfer    Stand-Sit [place] (Transfers) contact guard  -BS     Assistive Device (Stand-Sit Transfers) crutches, axillary  -BS       Row Name 04/12/24 1227          Activities of Daily Living    BADL Assessment/Intervention toileting;lower body dressing  -BS       Row Name 04/12/24 1227          Toileting Assessment/Training    [place] Level (Toileting) adjust/manage clothing;contact guard assist  -BS     Assistive Devices (Toileting) commode  -BS     Position (Toileting) supported standing  -BS       Row Name 04/12/24 1227          Lower Body Dressing Assessment/Training    [place] Level (Lower Body Dressing)  don;doff;socks;standby assist  -BS     Position (Lower Body Dressing) edge of bed sitting  -BS               User Key  (r) = Recorded By, (t) = Taken By, (c) = Cosigned By      Initials Name Provider Type    Yuliana Dubois OT Occupational Therapist                   Obj/Interventions       [facility] Name 04/12/24 1230          Sensory Assessment (Somatosensory)    Sensory Assessment Pt reports numbness and tingling along feet at baseline  -BS       [facility] Name 04/12/24 1230          Vision Assessment/Intervention    Visual Impairment/Limitations WFL  -BS       Row Name 04/12/24 1230          Range of Motion Comprehensive    General Range of Motion bilateral upper extremity ROM WFL  -BS       Row Name 04/12/24 1230          Strength Comprehensive (MMT)    General Manual Muscle Testing (MMT) Assessment no strength deficits identified  -BS     Comment, General Manual Muscle Testing (MMT) Assessment Generalized weakness noted. BUE grossly 5/5  -BS       Row Name 04/12/24 1230          Balance    Balance Assessment sitting static balance;sitting dynamic balance;standing dynamic balance;standing static balance  -BS     Static Sitting Balance standby assist  -BS     Dynamic Sitting Balance standby assist  -BS     Position, Sitting Balance unsupported;sitting edge of bed  -BS     Static Standing Balance contact guard  -BS     Dynamic Standing Balance contact guard  -BS     Position/Device Used, Standing Balance supported;crutches, axillary  RUE crutch  -BS     Balance Interventions sitting;standing;sit to stand;supported;static;dynamic;occupation based/functional task;weight shifting activity;dynamic reaching  -BS               User Key  (r) = Recorded By, (t) = Taken By, (c) = Cosigned By      Initials Name Provider Type    Yuliana Dubois OT Occupational Therapist                   Goals/Plan       Row Name 04/12/24 1316          Problem Specific Goal 1 (OT)    Problem Specific Goal 1 (OT) Pt will identify 1 safe transfer  "technique for commode use and functional mobility  -BS     Time Frame (Problem Specific Goal 1, OT) short term goal (STG);by discharge  -BS     Progress/Outcome (Problem Specific Goal 1, OT) goal met  -BS       Row Name 04/12/24 1316          Therapy Assessment/Plan (OT)    Planned Therapy Interventions (OT) activity tolerance training;occupation/activity based interventions  -BS               User Key  (r) = Recorded By, (t) = Taken By, (c) = Cosigned By      Initials Name Provider Type    BS Yuliana Sanford, NAILA Occupational Therapist                   Clinical Impression       Row Name 04/12/24 1233          Pain Assessment    Pretreatment Pain Rating 7/10  -BS     Posttreatment Pain Rating 8/10  -BS     Pain Location - Side/Orientation Bilateral  -BS     Pain Location generalized  -BS     Pre/Posttreatment Pain Comment Pt reports pain \"everywhere\" at baseline ranging from a 5-10  -BS     Pain Intervention(s) Repositioned;Ambulation/increased activity;Nursing Notified  -BS       Row Name 04/12/24 1233          Plan of Care Review    Plan of Care Reviewed With patient  -BS     Progress no change  -BS     Outcome Evaluation Pt is a 43 y.o. male admitted with cellulitis of arms with PMH of QUIQUE, IV drug use, and destruction of skeletal muscle. Pt reports he lives alone on the main floor of his home and is independent with all ADL and household management tasks. Pt reports he uses a crutch for stability and experiences numbness in B/L feet at baseline. Pt participated in bed mobility with SBA, LBD tasks with SBA, and toileting tasks with SBA. Pt appears at baseline function and not appropriate for skilled IPOT services. Pt reports plan is to d/c home.  -BS       Row Name 04/12/24 0166          Therapy Assessment/Plan (OT)    Rehab Potential (OT) other (see comments)  -BS     Criteria for Skilled Therapeutic Interventions Met (OT) does not meet criteria for skilled intervention  -BS     Therapy Frequency (OT) evaluation " only  -BS       Row Name 04/12/24 1233          Therapy Plan Review/Discharge Plan (OT)    Anticipated Discharge Disposition (OT) home with assist  -BS       Row Name 04/12/24 1233          Vital Signs    O2 Delivery Pre Treatment room air  -BS     Pre Patient Position Supine  -BS     Intra Patient Position Standing  -BS     Post Patient Position Supine  -BS       Row Name 04/12/24 1233          Positioning and Restraints    Pre-Treatment Position in bed  -BS     Post Treatment Position bed  -BS     In Bed notified nsg;supine;call light within reach;encouraged to call for assist;exit alarm on  -BS               User Key  (r) = Recorded By, (t) = Taken By, (c) = Cosigned By      Initials Name Provider Type    Yuliana Dubois, NAILA Occupational Therapist                   Outcome Measures       Row Name 04/12/24 1318          How much help from another is currently needed...    Putting on and taking off regular lower body clothing? 4  -BS     Bathing (including washing, rinsing, and drying) 3  -BS     Toileting (which includes using toilet bed pan or urinal) 4  -BS     Putting on and taking off regular upper body clothing 4  -BS     Taking care of personal grooming (such as brushing teeth) 4  -BS     Eating meals 4  -BS     AM-PAC 6 Clicks Score (OT) 23  -BS       Row Name 04/12/24 1535 04/12/24 0837       How much help from another person do you currently need...    Turning from your back to your side while in flat bed without using bedrails? 4  -CW 4  -LD    Moving from lying on back to sitting on the side of a flat bed without bedrails? 4  -CW 4  -LD    Moving to and from a bed to a chair (including a wheelchair)? 3  -CW 4  -LD    Standing up from a chair using your arms (e.g., wheelchair, bedside chair)? 3  -CW 4  -LD    Climbing 3-5 steps with a railing? 3  -CW 4  -LD    To walk in hospital room? 3  -CW 4  -LD    AM-PAC 6 Clicks Score (PT) 20  -CW 24  -LD    Highest Level of Mobility Goal 6 --> Walk 10 steps or  more  -CW 8 --> Walked 250 feet or more  -LD      Row Name 04/12/24 1535 04/12/24 1318       Functional Assessment    Outcome Measure Options AM-PAC 6 Clicks Basic Mobility (PT)  -CW AM-PAC 6 Clicks Daily Activity (OT)  -BS              User Key  (r) = Recorded By, (t) = Taken By, (c) = Cosigned By      Initials Name Provider Type    LD Criss Thomson, RN Registered Nurse    CW Anamaria Cazares, PT Physical Therapist    Yuliana Dubois, NAILA Occupational Therapist                    Occupational Therapy Education       Title: PT OT SLP Therapies (In Progress)       Topic: Occupational Therapy (Done)       Point: ADL training (Done)       Description:   Instruct learner(s) on proper safety adaptation and remediation techniques during self care or transfers.   Instruct in proper use of assistive devices.                  Learning Progress Summary             Patient Acceptance, E, VU by BS at 4/12/2024 1319    Comment: Reason for eval/ consult, next level of care, safe transfer strategies                         Point: Home exercise program (Done)       Description:   Instruct learner(s) on appropriate technique for monitoring, assisting and/or progressing therapeutic exercises/activities.                  Learning Progress Summary             Patient Acceptance, E, VU by BS at 4/12/2024 1319    Comment: Reason for eval/ consult, next level of care, safe transfer strategies                         Point: Precautions (Done)       Description:   Instruct learner(s) on prescribed precautions during self-care and functional transfers.                  Learning Progress Summary             Patient Acceptance, E, VU by BS at 4/12/2024 1319    Comment: Reason for eval/ consult, next level of care, safe transfer strategies                         Point: Body mechanics (Done)       Description:   Instruct learner(s) on proper positioning and spine alignment during self-care, functional mobility activities and/or exercises.                   Learning Progress Summary             Patient Acceptance, E, VU by BS at 4/12/2024 1616    Comment: Reason for eval/ consult, next level of care, safe transfer strategies                                         User Key       Initials Effective Dates Name Provider Type Discipline     11/14/22 -  Yuliana Sanford OT Occupational Therapist OT                  OT Recommendation and Plan  Planned Therapy Interventions (OT): activity tolerance training, occupation/activity based interventions  Therapy Frequency (OT): evaluation only  Plan of Care Review  Plan of Care Reviewed With: patient  Progress: no change  Outcome Evaluation: Pt is a 43 y.o. male admitted with cellulitis of arms with PMH of QUIQUE, IV drug use, and destruction of skeletal muscle. Pt reports he lives alone on the main floor of his home and is independent with all ADL and household management tasks. Pt reports he uses a crutch for stability and experiences numbness in B/L feet at baseline. Pt participated in bed mobility with SBA, LBD tasks with SBA, and toileting tasks with SBA. Pt appears at baseline function and not appropriate for skilled IPOT services. Pt reports plan is to d/c home.     Time Calculation:   Evaluation Complexity (OT)  Review Occupational Profile/Medical/Therapy History Complexity: expanded/moderate complexity  Clinical Decision Making Complexity (OT): detailed assessment/moderate complexity  Overall Complexity of Evaluation (OT): moderate complexity     Time Calculation- OT       Row Name 04/12/24 1326             Time Calculation- OT    OT Start Time 1057  -BS      OT Stop Time 1110  -BS      OT Time Calculation (min) 13 min  -BS      OT Received On 04/12/24  -BS         Untimed Charges    OT Eval/Re-eval Minutes 13  -BS         Total Minutes    Untimed Charges Total Minutes 13  -BS       Total Minutes 13  -BS                User Key  (r) = Recorded By, (t) = Taken By, (c) = Cosigned By      Initials Name Provider  Type    BS Yuliana Sanford OT Occupational Therapist                  Therapy Charges for Today       Code Description Service Date Service Provider Modifiers Qty    52133401312 HC OT EVAL MOD COMPLEXITY 3 4/12/2024 Yuliana Sanford OT GO 1                 Yuliana Sanford OT  4/12/2024

## 2024-04-12 NOTE — PLAN OF CARE
Goal Outcome Evaluation:  Plan of Care Reviewed With: patient        Progress: no change  Outcome Evaluation: pt is A&Ox4, hypotensie. COWS performed three times this shift, clonidine 0.1mg given once, atarax 25mg and dilaudid 0.5mg given once. Pt is reporting severe anxiety and discomfort. Pt breifly wanted fluids stopped and refused one antibiotic administration. Pt reports that he wants to go home ASAP. Plan of care ongoing.

## 2024-04-13 ENCOUNTER — APPOINTMENT (OUTPATIENT)
Dept: MRI IMAGING | Facility: HOSPITAL | Age: 44
End: 2024-04-13
Payer: MEDICARE

## 2024-04-13 ENCOUNTER — APPOINTMENT (OUTPATIENT)
Dept: NEUROLOGY | Facility: HOSPITAL | Age: 44
End: 2024-04-13
Payer: MEDICARE

## 2024-04-13 LAB
ANION GAP SERPL CALCULATED.3IONS-SCNC: 8.9 MMOL/L (ref 5–15)
BUN SERPL-MCNC: 9 MG/DL (ref 6–20)
BUN/CREAT SERPL: 10 (ref 7–25)
CALCIUM SPEC-SCNC: 8.5 MG/DL (ref 8.6–10.5)
CHLORIDE SERPL-SCNC: 116 MMOL/L (ref 98–107)
CO2 SERPL-SCNC: 21.1 MMOL/L (ref 22–29)
CREAT SERPL-MCNC: 0.9 MG/DL (ref 0.76–1.27)
CRP SERPL-MCNC: 1 MG/DL (ref 0–0.5)
DEPRECATED RDW RBC AUTO: 40.5 FL (ref 37–54)
EGFRCR SERPLBLD CKD-EPI 2021: 108.7 ML/MIN/1.73
ERYTHROCYTE [DISTWIDTH] IN BLOOD BY AUTOMATED COUNT: 13.3 % (ref 12.3–15.4)
GLUCOSE SERPL-MCNC: 104 MG/DL (ref 65–99)
HCT VFR BLD AUTO: 26.2 % (ref 37.5–51)
HGB BLD-MCNC: 8.5 G/DL (ref 13–17.7)
IRON 24H UR-MRATE: 68 MCG/DL (ref 59–158)
IRON SATN MFR SERPL: 24 % (ref 20–50)
MCH RBC QN AUTO: 27.2 PG (ref 26.6–33)
MCHC RBC AUTO-ENTMCNC: 32.4 G/DL (ref 31.5–35.7)
MCV RBC AUTO: 84 FL (ref 79–97)
PLATELET # BLD AUTO: 244 10*3/MM3 (ref 140–450)
PMV BLD AUTO: 9.3 FL (ref 6–12)
POTASSIUM SERPL-SCNC: 3.8 MMOL/L (ref 3.5–5.2)
RBC # BLD AUTO: 3.12 10*6/MM3 (ref 4.14–5.8)
SODIUM SERPL-SCNC: 146 MMOL/L (ref 136–145)
TIBC SERPL-MCNC: 288 MCG/DL (ref 298–536)
TRANSFERRIN SERPL-MCNC: 193 MG/DL (ref 200–360)
VANCOMYCIN TROUGH SERPL-MCNC: 12.9 MCG/ML (ref 5–20)
WBC NRBC COR # BLD AUTO: 4.82 10*3/MM3 (ref 3.4–10.8)

## 2024-04-13 PROCEDURE — 25810000003 SODIUM CHLORIDE 0.9 % SOLUTION 250 ML FLEX CONT: Performed by: HOSPITALIST

## 2024-04-13 PROCEDURE — 83540 ASSAY OF IRON: CPT | Performed by: HOSPITALIST

## 2024-04-13 PROCEDURE — G0378 HOSPITAL OBSERVATION PER HR: HCPCS

## 2024-04-13 PROCEDURE — 70544 MR ANGIOGRAPHY HEAD W/O DYE: CPT

## 2024-04-13 PROCEDURE — 86140 C-REACTIVE PROTEIN: CPT | Performed by: HOSPITALIST

## 2024-04-13 PROCEDURE — 95816 EEG AWAKE AND DROWSY: CPT | Performed by: STUDENT IN AN ORGANIZED HEALTH CARE EDUCATION/TRAINING PROGRAM

## 2024-04-13 PROCEDURE — 80202 ASSAY OF VANCOMYCIN: CPT | Performed by: INTERNAL MEDICINE

## 2024-04-13 PROCEDURE — 70551 MRI BRAIN STEM W/O DYE: CPT

## 2024-04-13 PROCEDURE — 95816 EEG AWAKE AND DROWSY: CPT

## 2024-04-13 PROCEDURE — 99232 SBSQ HOSP IP/OBS MODERATE 35: CPT | Performed by: INTERNAL MEDICINE

## 2024-04-13 PROCEDURE — 25010000002 VANCOMYCIN 1 G RECONSTITUTED SOLUTION 1 EACH VIAL: Performed by: HOSPITALIST

## 2024-04-13 PROCEDURE — 85027 COMPLETE CBC AUTOMATED: CPT | Performed by: HOSPITALIST

## 2024-04-13 PROCEDURE — 80048 BASIC METABOLIC PNL TOTAL CA: CPT | Performed by: HOSPITALIST

## 2024-04-13 PROCEDURE — 99214 OFFICE O/P EST MOD 30 MIN: CPT | Performed by: PSYCHIATRY & NEUROLOGY

## 2024-04-13 PROCEDURE — 84466 ASSAY OF TRANSFERRIN: CPT | Performed by: HOSPITALIST

## 2024-04-13 RX ORDER — LORAZEPAM 1 MG/1
1 TABLET ORAL EVERY 8 HOURS
Status: COMPLETED | OUTPATIENT
Start: 2024-04-13 | End: 2024-04-14

## 2024-04-13 RX ADMIN — VANCOMYCIN HYDROCHLORIDE 1000 MG: 1 INJECTION, POWDER, LYOPHILIZED, FOR SOLUTION INTRAVENOUS at 12:55

## 2024-04-13 RX ADMIN — HYDROXYZINE HYDROCHLORIDE 25 MG: 25 TABLET ORAL at 00:12

## 2024-04-13 RX ADMIN — VANCOMYCIN HYDROCHLORIDE 1000 MG: 1 INJECTION, POWDER, LYOPHILIZED, FOR SOLUTION INTRAVENOUS at 21:58

## 2024-04-13 RX ADMIN — GABAPENTIN 300 MG: 300 CAPSULE ORAL at 21:57

## 2024-04-13 RX ADMIN — CLONIDINE HYDROCHLORIDE 0.1 MG: 0.1 TABLET ORAL at 12:55

## 2024-04-13 RX ADMIN — HYDROXYZINE HYDROCHLORIDE 25 MG: 25 TABLET ORAL at 13:51

## 2024-04-13 RX ADMIN — HYDROCODONE BITARTRATE AND ACETAMINOPHEN 1 TABLET: 5; 325 TABLET ORAL at 19:27

## 2024-04-13 RX ADMIN — HYDROCODONE BITARTRATE AND ACETAMINOPHEN 1 TABLET: 5; 325 TABLET ORAL at 12:55

## 2024-04-13 RX ADMIN — GABAPENTIN 300 MG: 300 CAPSULE ORAL at 01:00

## 2024-04-13 RX ADMIN — HYDROCODONE BITARTRATE AND ACETAMINOPHEN 1 TABLET: 5; 325 TABLET ORAL at 06:32

## 2024-04-13 RX ADMIN — LORAZEPAM 1 MG: 1 TABLET ORAL at 10:32

## 2024-04-13 RX ADMIN — GABAPENTIN 300 MG: 300 CAPSULE ORAL at 10:46

## 2024-04-13 RX ADMIN — LORAZEPAM 1 MG: 1 TABLET ORAL at 05:01

## 2024-04-13 RX ADMIN — Medication 5000 UNITS: at 10:53

## 2024-04-13 RX ADMIN — HYDROCODONE BITARTRATE AND ACETAMINOPHEN 1 TABLET: 5; 325 TABLET ORAL at 00:09

## 2024-04-13 RX ADMIN — Medication 1 PATCH: at 10:33

## 2024-04-13 RX ADMIN — LORAZEPAM 1 MG: 1 TABLET ORAL at 15:57

## 2024-04-13 NOTE — PROGRESS NOTES
Name: Ryan Marina ADMIT: 2024   : 1980  PCP: Primitivo Link MD    MRN: [id number] LOS: 0 days   AGE/SEX: 43 y.o. male  ROOM: [facility]     Subjective   Subjective   Patient complains of diffuse body pain/aches, not much better. No more episodes like the one described yesterday       Objective   Objective   Vital Signs  Temp:  [97.2 °F (36.2 °C)-98.5 °F (36.9 °C)] 97.6 °F (36.4 °C)  Heart Rate:  [50-91] 50  Resp:  [16-20] 16  BP: (107-137)/(62-80) 137/75  SpO2:  [97 %-99 %] 99 %  on   ;   Device (Oxygen Therapy): room air  Body mass index is 22.1 kg/m².  Physical Exam  Vitals reviewed.   Constitutional:       General: He is not in acute distress.  Eyes:      General: No scleral icterus.  Neck:      Vascular: No JVD.   Cardiovascular:      Rate and Rhythm: Normal rate and regular rhythm.      Heart sounds: No murmur heard.  Pulmonary:      Effort: Pulmonary effort is normal. No respiratory distress.      Breath sounds: No wheezing.   Abdominal:      General: There is no distension.      Palpations: Abdomen is soft.      Tenderness: There is no abdominal tenderness.   Musculoskeletal:      Right lower leg: No edema.      Left lower leg: No edema.      Comments: No significant tenderness at the right olecranon bursa.     Skin:     General: Skin is warm and dry.      Comments: Innumerable track marks on both arms bilaterally, most covered with eschar/necrotic tissue.  There is some mild erythema but nothing bright red, basically unchanged   Neurological:      General: No focal deficit present.      Mental Status: He is alert and oriented to person, place, and time.   Psychiatric:      Comments: More calm but flat affect.       Results Review     I reviewed the patient's new clinical results.  Results from last 7 days   Lab Units 24  0540 24  0610 24  1437   WBC 10*3/mm3 4.82 3.55 4.81   HEMOGLOBIN g/dL 8.5* 8.6* 8.9*   PLATELETS 10*3/mm3 244 221 255     Results from last 7 days  "  Lab Units 04/13/24  0540 04/12/24  0610 04/11/24  1437   SODIUM mmol/L 146* 141 139   POTASSIUM mmol/L 3.8 3.6 3.6   CHLORIDE mmol/L 116* 110* 106   CO2 mmol/L 21.1* 21.2* 24.0   BUN mg/dL 9 8 11   CREATININE mg/dL 0.90 0.98 1.17   GLUCOSE mg/dL 104* 148* 133*   EGFR mL/min/1.73 108.7 98.1 79.3     Results from last 7 days   Lab Units 04/11/24  1437   ALBUMIN g/dL 3.5   BILIRUBIN mg/dL 0.4   ALK PHOS U/L 129*   AST (SGOT) U/L 64*   ALT (SGPT) U/L 25     Results from last 7 days   Lab Units 04/13/24  0540 04/12/24  0610 04/11/24  1437   CALCIUM mg/dL 8.5* 7.9* 8.3*   ALBUMIN g/dL  --   --  3.5     Results from last 7 days   Lab Units 04/11/24  1437   PROCALCITONIN ng/mL 0.18   LACTATE mmol/L 1.4     No results found for: \"HGBA1C\", \"POCGLU\"    CT Upper Extremity Left With Contrast    Result Date: 4/12/2024  Generalized edema in the subcutaneous fat about both upper extremities consistent with cellulitis. There are areas of cutaneous irregularity suggesting cutaneous wounds. On the right, there is an olecranon bursal collection consistent with infectious bursitis measuring approximately 2.3 x 1.0 x 2.4 cm. Within the subcutaneous fat lateral to the distal humeral metaphyses and brachioradialis muscles there is coalescent edema which exhibits discontinuous peripheral enhancement consistent with early phlegmonous change or developing abscess formation such as demonstrated on the right on axial image 174 and on the left on axial image 167.  Radiation dose reduction techniques were utilized, including automated exposure control and exposure modulation based on body size.   This report was finalized on 4/12/2024 12:48 PM by Dr. Primitivo Cochran M.D on Workstation: [id number]       I have personally reviewed all medications:  Scheduled Medications  bumetanide, 2 mg, Oral, Daily  cholecalciferol, 5,000 Units, Oral, Daily  LORazepam, 1 mg, Oral, Q8H  nicotine, 1 patch, Transdermal, Q24H  vancomycin, 1,000 mg, Intravenous, " "Q12H    Infusions  Pharmacy to dose vancomycin,     Diet  Diet: Regular/House; Fluid Consistency: Thin (IDDSI 0)    I have personally reviewed:  [x]  Laboratory   [x]  Microbiology   [x]  Radiology   []  EKG/Telemetry  []  Cardiology/Vascular   []  Pathology    []  Records       Assessment/Plan     Active Hospital Problems    Diagnosis  POA    **Cellulitis of arm [L03.119]  Yes    Opioid use disorder, severe, dependence [F11.20]  Yes    Benzodiazepine abuse [F13.10]  Yes    Anemia, chronic disease [D63.8]  Unknown    CKD (chronic kidney disease) stage 2, GFR 60-89 ml/min [N18.2]  Unknown      Resolved Hospital Problems   No resolved problems to display.       43 y.o. male with longstanding IV drug use and polysubstance abuse along with history of C3 nephropathy admitted with bilateral arm cellulitis.    Discussed care with orthopedics last evening.  No acute surgical issue, would need transfer to tertiary facility if skin debridement was indicated.  At this point I am not sure there is any acute reason for this but will defer to ID once they evaluate today.  CRP is only 1.  Continue vancomycin per their previous recommendations.  He does not seem to have any evidence for bursitis on exam.    Appreciate neurology evaluation regarding questionable staring episodes/inability to move and speak yesterday.  Tapering benzodiazepine dose down given extensive Xanax abuse outpatient    Continue clonidine protocol for opiate withdrawal.      Pain control obviously an issue.  Given this potential abscess I wrote for low-dose Norco but will not escalate this any further.  Will not prescribe IV meds or any pain medicine at discharge.  Hesitant to give NSAIDs related to prior nephropathy and renal disease although currently creatinine is stable.    Access center has seen and patient has expressed little desire to pursue any treatment options.  He does express desire today to \"detox here\" because he does not want to go back to " using after discharge    Very poor overall prognosis.    SCDs      Ishan Kaur MD  [facility]  04/13/24  13:32 EDT

## 2024-04-13 NOTE — PROGRESS NOTES
LOS: 0 days     Chief Complaint:  upper extremity wounds     Interval History: Afebrile, tolerating antibiotics without vomiting diarrhea or rash.    Vital Signs  Temp:  [97.2 °F (36.2 °C)-98.5 °F (36.9 °C)] 97.6 °F (36.4 °C)  Heart Rate:  [50-91] 50  Resp:  [16-20] 16  BP: (107-137)/(62-80) 137/75    Physical Exam:  General: In no acute distress  Cardiovascular: Normal rhythm and rate  Respiratory: Breathing comfortably on room air  GI: Soft, NT/ND,   Skin: Numerous scabbed over areas in the upper extremities with areas of necrosis bilaterally positive track marks no purulence    Antibiotics:  Vancomycin dosing per pharmacy     Results Review:    Lab Results   Component Value Date    WBC 4.82 04/13/2024    HGB 8.5 (L) 04/13/2024    HCT 26.2 (L) 04/13/2024    MCV 84.0 04/13/2024     04/13/2024     Lab Results   Component Value Date    GLUCOSE 104 (H) 04/13/2024    BUN 9 04/13/2024    CREATININE 0.90 04/13/2024    EGFRIFAFRI 51 (L) 01/22/2023    BCR 10.0 04/13/2024    CO2 21.1 (L) 04/13/2024    CALCIUM 8.5 (L) 04/13/2024    ALBUMIN 3.5 04/11/2024    LABIL2 0.9 (L) 01/22/2023    AST 64 (H) 04/11/2024    ALT 25 04/11/2024       Microbiology:NGTD x 2  4/11 COVID, flu and RSV negative    4/12 CT of the upper extremity shows generalized edema in the subcutaneous fat consistent with cellulitis.  Olecranon bursal fluid collection on the right.  Lateral to the distal humeral metaphyses there is a developing phlegmon/abscess.    Assessment & Plan   #Bilateral upper extremity wounds  #IVDU with predominantly heroin and reportedly used methadone in the past    Orthopedic note reviewed.  No concern for joint involvement.  CT with concern for fluid collection in the olecranon bursa on the right with physical exam per orthopedic surgery not consistent with any abnormal findings in that area.  They recommend transfer to facility with hand/plastic surgery coverage.  Continue vancomycin dosing per pharmacy.  Follow-up pending  cultures.    We will review the patient's chart tomorrow and plan to see again on Monday.  Please do not hesitate to call us with further questions or concerns

## 2024-04-13 NOTE — PROGRESS NOTES
"[facility] Clinical Pharmacy Services: Vancomycin Monitoring Note    Ryan Marina is a 43 y.o. male who is on day 2/5 of pharmacy to dose vancomycin for Skin and Soft Tissue.    Previous Vancomycin Dose:   750 mg IV every  12  hours  Updated Cultures and Sensitivities:   Blood culture - no growth 24 hrs    Results from last 7 days   Lab Units 04/13/24  0540 04/12/24  1700   VANCOMYCIN TR mcg/mL 12.90 12.50     Vitals/Labs  Ht: 182.9 cm (72\"); Wt: 73.9 kg (162 lb 14.7 oz)   Temp Readings from Last 1 Encounters:   04/13/24 97.2 °F (36.2 °C) (Oral)     Estimated Creatinine Clearance: 110.6 mL/min (by C-G formula based on SCr of 0.9 mg/dL).       Results from last 7 days   Lab Units 04/13/24  0540 04/12/24  0610 04/11/24  1437   CREATININE mg/dL 0.90 0.98 1.17   WBC 10*3/mm3 4.82 3.55 4.81     Assessment/Plan    Current Vancomycin Dose: 750 mg IV every  12  hours; provides a predicted  mg/L.hr ; will adjust dose to 1000 mg IV Q 12 hours which provides predicted  mg/L.hr    Next Level Date and Time: Vanc Trough on 4/14/24 at 0800  We will continue to monitor patient changes and renal function     Thank you for involving pharmacy in this patient's care. Please contact pharmacy with any questions or concerns.       Criss Westfall, [facility]  Clinical Pharmacist          "

## 2024-04-13 NOTE — CONSULTS
"Neurology Consult Note    Consult Date: 4/13/2024    Referring MD: Dr. Kaur    Reason for Consult I have been asked to see the patient in neurological consultation to render advice and opinion regarding altered mental status    Ryan Marina is a 43 y.o. male with history of CHF, CKD, drug abuse currently admitted with severe cellulitis of the upper extremities.  In the setting of that he had a spell yesterday where he describes not being able to talk or move for 10 minutes.  I was consulted for evaluation of this.  He denies any prior similar episodes.  No history of seizures.  No associated convulsion and he remembers the entire event.  No previous strokes.    Past Medical History:   Diagnosis Date    Anemia     Arthritis     CHF (congestive heart failure)     Renal disorder     Vitamin D deficiency        Exam  /75 (BP Location: Right arm, Patient Position: Lying)   Pulse 50   Temp 97.6 °F (36.4 °C) (Oral)   Resp 16   Ht 182.9 cm (72\")   Wt 73.9 kg (162 lb 14.7 oz)   SpO2 99%   BMI 22.10 kg/m²   Gen: NAD, vitals reviewed  MS: oriented x3, recent/remote memory intact, normal attention/concentration, language intact, no neglect.  CN: visual acuity grossly normal, PERRL, EOMI, no facial droop, no dysarthria  Motor: 5/5 throughout upper and lower extremities, normal tone    DATA:    Lab Results   Component Value Date    GLUCOSE 104 (H) 04/13/2024    CALCIUM 8.5 (L) 04/13/2024     (H) 04/13/2024    K 3.8 04/13/2024    CO2 21.1 (L) 04/13/2024     (H) 04/13/2024    BUN 9 04/13/2024    CREATININE 0.90 04/13/2024    EGFRIFAFRI 51 (L) 01/22/2023    BCR 10.0 04/13/2024    ANIONGAP 8.9 04/13/2024     Lab Results   Component Value Date    WBC 4.82 04/13/2024    HGB 8.5 (L) 04/13/2024    HCT 26.2 (L) 04/13/2024    MCV 84.0 04/13/2024     04/13/2024       Lab review: Sodium 146, hemoglobin 8.5    Imaging review: MRI brain, MRA head without contrast ordered    Routine EEG " ordered    Diagnoses:  Generalized weakness  Transient aphasia  IV drug abuse  Bilateral upper extremity cellulitis    Comment: Unclear etiology of his symptoms. Differential includes panic attack, partial seizure, TIA. I would favor panic attack    PLAN:  MRI/MRA without contrast  Routine EEG    If above studies negative would not pursue further neurologic workup for now.

## 2024-04-13 NOTE — PLAN OF CARE
Problem: Adult Inpatient Plan of Care  Goal: Plan of Care Review  Outcome: Ongoing, Progressing  Flowsheets (Taken 4/13/2024 1807)  Progress: no change  Plan of Care Reviewed With: patient  Outcome Evaluation: COWs score is moderately high at 22. Gave patient Gabpentin and Clondine x1 per order. Norco x1 for pain. And atarax x1 for MRI scheduled. Pt is on room air. New IV placed but need to push slowly. Vanco given as order. Pt is stand by assist. NS @125. Reg diet. MRI and EEG done today     Problem: Adult Inpatient Plan of Care  Goal: Patient-Specific Goal (Individualized)  Outcome: Ongoing, Progressing  Flowsheets (Taken 4/13/2024 1807)  Patient-Specific Goals (Include Timeframe): to feel better  Individualized Care Needs: PRN and scheduled meds, assist x1

## 2024-04-13 NOTE — PLAN OF CARE
Goal Outcome Evaluation:   Patient alert and oriented. Access and drains: no IV access at this time, waiting on ultrasound placement. PRNs utilized during shift: norco 5mg PO x1, gabapentin 300mg PO x1, atarax 25mg PO x1. No clonidine interventions during shift.. No complaints of nausea during shift. No acute adverse events during shift. Plan of care ongoing.         Progress: no change

## 2024-04-14 LAB — VANCOMYCIN TROUGH SERPL-MCNC: 13.9 MCG/ML (ref 5–20)

## 2024-04-14 PROCEDURE — 80202 ASSAY OF VANCOMYCIN: CPT | Performed by: HOSPITALIST

## 2024-04-14 PROCEDURE — 99232 SBSQ HOSP IP/OBS MODERATE 35: CPT | Performed by: PSYCHIATRY & NEUROLOGY

## 2024-04-14 PROCEDURE — 25010000002 VANCOMYCIN 1 G RECONSTITUTED SOLUTION 1 EACH VIAL: Performed by: HOSPITALIST

## 2024-04-14 PROCEDURE — 25810000003 SODIUM CHLORIDE 0.9 % SOLUTION 250 ML FLEX CONT: Performed by: HOSPITALIST

## 2024-04-14 RX ORDER — LORAZEPAM 0.5 MG/1
0.5 TABLET ORAL EVERY 8 HOURS
Status: DISCONTINUED | OUTPATIENT
Start: 2024-04-14 | End: 2024-04-16

## 2024-04-14 RX ADMIN — VANCOMYCIN HYDROCHLORIDE 1000 MG: 1 INJECTION, POWDER, LYOPHILIZED, FOR SOLUTION INTRAVENOUS at 22:12

## 2024-04-14 RX ADMIN — GABAPENTIN 300 MG: 300 CAPSULE ORAL at 06:12

## 2024-04-14 RX ADMIN — VANCOMYCIN HYDROCHLORIDE 1000 MG: 1 INJECTION, POWDER, LYOPHILIZED, FOR SOLUTION INTRAVENOUS at 09:28

## 2024-04-14 RX ADMIN — LORAZEPAM 0.5 MG: 0.5 TABLET ORAL at 15:21

## 2024-04-14 RX ADMIN — CLONIDINE HYDROCHLORIDE 0.1 MG: 0.1 TABLET ORAL at 12:29

## 2024-04-14 RX ADMIN — LORAZEPAM 0.5 MG: 0.5 TABLET ORAL at 22:11

## 2024-04-14 RX ADMIN — HYDROCODONE BITARTRATE AND ACETAMINOPHEN 1 TABLET: 5; 325 TABLET ORAL at 03:10

## 2024-04-14 RX ADMIN — HYDROCODONE BITARTRATE AND ACETAMINOPHEN 1 TABLET: 5; 325 TABLET ORAL at 15:47

## 2024-04-14 RX ADMIN — HYDROCODONE BITARTRATE AND ACETAMINOPHEN 1 TABLET: 5; 325 TABLET ORAL at 09:28

## 2024-04-14 RX ADMIN — GABAPENTIN 300 MG: 300 CAPSULE ORAL at 15:21

## 2024-04-14 RX ADMIN — HYDROCODONE BITARTRATE AND ACETAMINOPHEN 1 TABLET: 5; 325 TABLET ORAL at 23:51

## 2024-04-14 RX ADMIN — LORAZEPAM 1 MG: 1 TABLET ORAL at 09:28

## 2024-04-14 RX ADMIN — LORAZEPAM 1 MG: 1 TABLET ORAL at 00:37

## 2024-04-14 RX ADMIN — Medication 5000 UNITS: at 09:28

## 2024-04-14 RX ADMIN — Medication 1 PATCH: at 09:28

## 2024-04-14 NOTE — PROGRESS NOTES
"[facility] Clinical Pharmacy Services: Vancomycin Monitoring Note    Ryan Marina is a 43 y.o. male who is on day 3/5 of pharmacy to dose vancomycin for Skin and Soft Tissue.    Previous Vancomycin Dose:   1000 mg IV every  12  hours    Results from last 7 days   Lab Units 04/14/24  0757 04/13/24  0540 04/12/24  1700   VANCOMYCIN TR mcg/mL 13.90 12.90 12.50     Vitals/Labs  Ht: 182.9 cm (72\"); Wt: 73.9 kg (162 lb 14.7 oz)   Temp Readings from Last 1 Encounters:   04/14/24 98.3 °F (36.8 °C) (Oral)     Estimated Creatinine Clearance: 110.6 mL/min (by C-G formula based on SCr of 0.9 mg/dL).       Results from last 7 days   Lab Units 04/13/24  0540 04/12/24  0610 04/11/24  1437   CREATININE mg/dL 0.90 0.98 1.17   WBC 10*3/mm3 4.82 3.55 4.81     Assessment/Plan    Current Vancomycin Dose: 1000 mg IV every  12  hours; provides a predicted  mg/L.hr   Vancomycin trough is therapeutic at 13.9 mcg/mL and AUC is 511 in therapeutic range of 400-600 mg/L.hr  We will continue to monitor patient changes and renal function     Thank you for involving pharmacy in this patient's care. Please contact pharmacy with any questions or concerns.       Criss Westfall, [facility]  Clinical Pharmacist          "

## 2024-04-14 NOTE — PROGRESS NOTES
"DOS: 2024  NAME: Ryan Marina   : 1980  PCP: Primitivo Link MD  Chief Complaint   Patient presents with    Wound Check       Chief complaint: Generalized weakness  Subjective: Patient reports another similar spell yesterday of feeling \"locked in\".  This lasted a few minutes and resolved.  He has ongoing treatment for benzodiazepine withdrawal with CIWA protocol.    Objective:  Vital signs: /75 (BP Location: Left arm, Patient Position: Lying)   Pulse 65   Temp 98.3 °F (36.8 °C) (Oral)   Resp 16   Ht 182.9 cm (72\")   Wt 73.9 kg (162 lb 14.7 oz)   SpO2 98%   BMI 22.10 kg/m²    Gen: NAD, vitals reviewed  MS: oriented x3, recent/remote memory intact, normal attention/concentration, language intact, no neglect.  CN: visual acuity grossly normal, PERRL, EOMI, no facial droop, no dysarthria  Motor: 4 extremities    Laboratory results:  Lab Results   Component Value Date    GLUCOSE 104 (H) 2024    CALCIUM 8.5 (L) 2024     (H) 2024    K 3.8 2024    CO2 21.1 (L) 2024     (H) 2024    BUN 9 2024    CREATININE 0.90 2024    EGFRIFAFRI 51 (L) 2023    BCR 10.0 2024    ANIONGAP 8.9 2024     Lab Results   Component Value Date    WBC 4.82 2024    HGB 8.5 (L) 2024    HCT 26.2 (L) 2024    MCV 84.0 2024     2024     Review and interpretation of imaging: I personally reviewed his MRI and MRA performed yesterday which were normal.  Radiology report reviewed.    EEG was normal except for excessive beta consistent with Ativan administration    Diagnoses:  Panic attack  Diazepam withdrawal  Polysubstance abuse    Comment: Patient's episodes are consistent with panic attacks which are likely in part provoked from benzodiazepine withdrawal.  Note that he does have a prior history of panic attacks in the past and does struggle with severe anxiety    Plan:  Continue CIWA protocol  No further inpatient " neurologic workup needed  Likely would benefit from an SSRI in the future for generalized anxiety and panic disorder    Management discussed with patient, nursing staff. I will see him as needed.

## 2024-04-14 NOTE — PROGRESS NOTES
"Access Center follow up d/t drugs; this writer reviewed chart, spoke with VONDA Phelps, and met with patient individually in room P497. Per RN, patient appears lethargic and flushed with frequent yawning; nursing does not believe he/pt used any substance while hospitalized.  Last COWS 22 at 12:55; patient states he wants to detox here and return home once medically stable.  Patient reports living with his dad; however, he states his dad \"tolerates\" him and is often at the [facility].  This writer listened and provided support/empathy as patient talked about his brother's overdose/death and mom's death due to cancer about 2 years ago.  Patient denies SI, HI, and/or current auditory/visual hallucinations; it is important to note, however, that patient's eye contact significantly decreased when answering questions about suicidality and experience of auditory/visual hallucinations while at [facility].  Patient reports using fentanyl intravenously; due to lack of vein access, pt has been injecting sublingually into his arms (significant scabbing noted to right forearm).  Patient also acknowledges daily illicit Xanax use; he states it helps significant anxiety which is/has been left untreated/ineffectively treated \"for years\".  This writer talked with patient about various levels of ([facility]) care including recovery support communities; patient states he feels like those people are deceitful/deceptive and declines behavioral health/[facility] referrals at present. This clinician encouraged patient to consider [facility] resources and inpatient psychiatrist consult; patient agreed to meet with psychiatrist, Dr. Mccoy (RN agrees to address/place order per attending MD).  No further needs/concerns noted at this time per pt and/or medical team; [facility] to continue following.  "

## 2024-04-14 NOTE — CONSULTS
IDENTIFYING INFORMATION: The patient is a 43-year-old white male admitted for treatment of cellulitis related to intramuscular injection of fentanyl    CHIEF COMPLAINT: None given    INFORMANT: Patient and chart    RELIABILITY: Good    HISTORY OF PRESENT ILLNESS: The patient is a 43-year-old white male admitted for treatment of cellulitis related to intramuscular injection of fentanyl.  The patient reports that he is no longer able to find veins prompting this means of fentanyl administration.  The patient also admits to abuse of illicitly obtained sedative-hypnotics.  The patient reports that he has been on both methadone and Suboxone in the past and did well on Suboxone.  He wishes to restart this medication once he leaves the hospital.  He does not express interest in residential chemical dependence treatment but states that he has completed several such treatment courses in the past.  The patient reports that he receives disability related to chronic kidney disease as well as multiple traumatic injuries in the past.  He denies current suicidal or homicidal psychotic features though he does report a history of previous suicide attempts.  He does not express any interest in initiation of antidepressant or other psychotropic medication apart from Suboxone.    PAST PSYCHIATRIC HISTORY: As above    PAST MEDICAL HISTORY: Significant for cellulitis, chronic anemia and chronic renal disease    MEDICATIONS:   Current Facility-Administered Medications   Medication Dose Route Frequency Provider Last Rate Last Admin    acetaminophen (TYLENOL) tablet 650 mg  650 mg Oral Q4H PRN Cristine Matos MD   650 mg at 04/11/24 2248    Or    acetaminophen (TYLENOL) 160 MG/5ML oral solution 650 mg  650 mg Oral Q4H PRN Cristine Matos MD        Or    acetaminophen (TYLENOL) suppository 650 mg  650 mg Rectal Q4H PRN Cristine Matos MD        sennosides-docusate (PERICOLACE) 8.6-50 MG per tablet 2 tablet  2 tablet Oral  BID PRN Cristine Matos MD        And    polyethylene glycol (MIRALAX) packet 17 g  17 g Oral Daily PRN Cristine Matos MD        And    bisacodyl (DULCOLAX) EC tablet 5 mg  5 mg Oral Daily PRN Cristine Matos MD        And    bisacodyl (DULCOLAX) suppository 10 mg  10 mg Rectal Daily PRN Cristine Matos MD        cholecalciferol (VITAMIN D3) tablet 5,000 Units  5,000 Units Oral Daily Cristine Matos MD   5,000 Units at 04/14/24 0928    cloNIDine (CATAPRES) tablet 0.1 mg  0.1 mg Oral BID PRN Jennifer Santoyo APRN   0.1 mg at 04/14/24 1229    Followed by    [START ON 4/15/2024] cloNIDine (CATAPRES) tablet 0.1 mg  0.1 mg Oral Once PRN Jennifer Santoyo APRN        gabapentin (NEURONTIN) capsule 300 mg  300 mg Oral Q8H PRN Cristine Matos MD   300 mg at 04/14/24 1521    HYDROcodone-acetaminophen (NORCO) 5-325 MG per tablet 1 tablet  1 tablet Oral Q6H PRN Ishan Kaur MD   1 tablet at 04/14/24 1547    hydrOXYzine (ATARAX) tablet 25 mg  25 mg Oral TID PRN Cait Manzo APRN   25 mg at 04/13/24 1351    LORazepam (ATIVAN) tablet 0.5 mg  0.5 mg Oral Q8H Ishan Kaur MD   0.5 mg at 04/14/24 1521    nicotine (NICODERM CQ) 14 MG/24HR patch 1 patch  1 patch Transdermal Q24H Cait Manzo APRN   1 patch at 04/14/24 0928    Pharmacy to dose vancomycin   Does not apply Continuous PRN Cristine Matos MD        sodium chloride 0.9 % flush 10 mL  10 mL Intravenous PRN Christiano Sanford MD        vancomycin (VANCOCIN) 1,000 mg in sodium chloride 0.9 % 250 mL IVPB-VTB  1,000 mg Intravenous Q12H Ishan Kaur MD   Stopped at 04/14/24 1130         ALLERGIES: None    FAMILY HISTORY: Noncontributory    SOCIAL HISTORY: Substance use history as described previously.  The patient is disabled secondary to multiple medical problems.    MENTAL STATUS EXAM: The patient is a thin ill appearing white male dressed in hospital garb.  He  is in no apparent physical distress at the time of examination.  He is awake alert and oriented all spheres.  His mood is mildly dysphoric his affect incurrent.  Speech is well coherent.  There are no deficits memory or cognition noted.  Intelligence is judged to be in the average range based on fund of knowledge, the patient is cooperative with interview.  He denies current suicidal or homicidal ideation psychotic features.  Judgement and insight are intact.    ASSETS/LIABILITIES: To be assessed/health issues    DIAGNOSTIC IMPRESSION: Opioid use disorder, sedative-hypnotic use disorder, medical problems as documented previously    PLAN: The patient states that he wishes to begin Suboxone treatment upon discharge from the hospital and does not express any interest in initiation of any other psychotropic medications or pursuit of residential chemical dependence treatment.  I will ask the access center to provide the patient with information regarding post discharge chemical dependence treatment options.  The patient reports that he will probably return to his previous Suboxone clinic.  Little else to add, I will sign off.

## 2024-04-14 NOTE — PROGRESS NOTES
Name: Ryan Marina ADMIT: 2024   : 1980  PCP: Primitivo Link MD    MRN: [id number] LOS: 0 days   AGE/SEX: 43 y.o. male  ROOM: Replaced by [facility]     Subjective   Subjective   Patient still says he does not feel well but overall looks brighter and actually admits that he is doing a lot better than he was before.  He admits to me that he thinks he is turning a corner       Objective   Objective   Vital Signs  Temp:  [97.1 °F (36.2 °C)-98.6 °F (37 °C)] 97.1 °F (36.2 °C)  Heart Rate:  [51-73] 73  Resp:  [16] 16  BP: (111-132)/(59-80) 132/76  SpO2:  [98 %-100 %] 98 %  on   ;   Device (Oxygen Therapy): room air  Body mass index is 22.1 kg/m².  Physical Exam  Vitals reviewed.   Constitutional:       General: He is not in acute distress.  Eyes:      General: No scleral icterus.  Neck:      Vascular: No JVD.   Cardiovascular:      Rate and Rhythm: Normal rate and regular rhythm.      Heart sounds: No murmur heard.  Pulmonary:      Effort: Pulmonary effort is normal. No respiratory distress.      Breath sounds: No wheezing.   Abdominal:      General: There is no distension.      Palpations: Abdomen is soft.      Tenderness: There is no abdominal tenderness.   Musculoskeletal:      Right lower leg: No edema.      Left lower leg: No edema.      Comments: No significant tenderness at the right olecranon bursa.     Skin:     General: Skin is warm and dry.      Comments: Innumerable track marks on both arms bilaterally, most covered with eschar/necrotic tissue.  There is some mild erythema on the right arm unchanged.  Left arm erythema actually looks a lot better and wounds are healing   Neurological:      General: No focal deficit present.      Mental Status: He is alert and oriented to person, place, and time.   Psychiatric:      Comments: Calm, more interactive.  Smiling some       Results Review     I reviewed the patient's new clinical results.  Results from last 7 days   Lab Units 24  0540 24  0610  "04/11/24  1437   WBC 10*3/mm3 4.82 3.55 4.81   HEMOGLOBIN g/dL 8.5* 8.6* 8.9*   PLATELETS 10*3/mm3 244 221 255     Results from last 7 days   Lab Units 04/13/24  0540 04/12/24  0610 04/11/24  1437   SODIUM mmol/L 146* 141 139   POTASSIUM mmol/L 3.8 3.6 3.6   CHLORIDE mmol/L 116* 110* 106   CO2 mmol/L 21.1* 21.2* 24.0   BUN mg/dL 9 8 11   CREATININE mg/dL 0.90 0.98 1.17   GLUCOSE mg/dL 104* 148* 133*   EGFR mL/min/1.73 108.7 98.1 79.3     Results from last 7 days   Lab Units 04/11/24  1437   ALBUMIN g/dL 3.5   BILIRUBIN mg/dL 0.4   ALK PHOS U/L 129*   AST (SGOT) U/L 64*   ALT (SGPT) U/L 25     Results from last 7 days   Lab Units 04/13/24  0540 04/12/24  0610 04/11/24  1437   CALCIUM mg/dL 8.5* 7.9* 8.3*   ALBUMIN g/dL  --   --  3.5     Results from last 7 days   Lab Units 04/11/24  1437   PROCALCITONIN ng/mL 0.18   LACTATE mmol/L 1.4     No results found for: \"HGBA1C\", \"POCGLU\"    MRI Brain Without Contrast    Result Date: 4/13/2024   Essentially unremarkable MRI of the brain.   TECHNIQUE: MRA of the head was obtained with 3D time-of-flight imaging technique. Maximum intensity projection reconstructed images were obtained.  FINDINGS:  There is normal flow related enhancement within the vertebral arteries, basilar artery, and posterior cerebral arteries. The internal carotid arteries, middle cerebral arteries, and anterior cerebral arteries also have normal flow related enhancement.  IMPRESSION:  Unremarkable MRA of the head.  This report was finalized on 4/13/2024 6:47 PM by Dr. Layo Brumfield M.D on Workstation: [id number]      MRI Angiogram Head Without Contrast    Result Date: 4/13/2024   Essentially unremarkable MRI of the brain.   TECHNIQUE: MRA of the head was obtained with 3D time-of-flight imaging technique. Maximum intensity projection reconstructed images were obtained.  FINDINGS:  There is normal flow related enhancement within the vertebral arteries, basilar artery, and posterior cerebral arteries. The " internal carotid arteries, middle cerebral arteries, and anterior cerebral arteries also have normal flow related enhancement.  IMPRESSION:  Unremarkable MRA of the head.  This report was finalized on 4/13/2024 6:47 PM by Dr. Layo Brumfield M.D on Workstation: [id number]       I have personally reviewed all medications:  Scheduled Medications  bumetanide, 2 mg, Oral, Daily  cholecalciferol, 5,000 Units, Oral, Daily  nicotine, 1 patch, Transdermal, Q24H  vancomycin, 1,000 mg, Intravenous, Q12H    Infusions  Pharmacy to dose vancomycin,     Diet  Diet: Regular/House; Fluid Consistency: Thin (IDDSI 0)    I have personally reviewed:  [x]  Laboratory   [x]  Microbiology   [x]  Radiology   []  EKG/Telemetry  []  Cardiology/Vascular   []  Pathology    []  Records       Assessment/Plan     Active Hospital Problems    Diagnosis  POA    **Cellulitis of arm [L03.119]  Yes    Opioid use disorder, severe, dependence [F11.20]  Yes    Benzodiazepine abuse [F13.10]  Yes    Anemia, chronic disease [D63.8]  Unknown    CKD (chronic kidney disease) stage 2, GFR 60-89 ml/min [N18.2]  Unknown      Resolved Hospital Problems   No resolved problems to display.       43 y.o. male with longstanding IV drug use and polysubstance abuse along with history of C3 nephropathy admitted with bilateral arm cellulitis.    Overall he is improving.  His wounds are stable, still no evidence for bursitis or uncontrolled deeper infection.  ABX per ID recommendations  - Per orthopedics, no acute surgical issue, would need transfer to tertiary facility if skin debridement was indicated.  - Low-dose Norco on board for pain, avoid IV meds.  Not using NSAID due to history of C3 nephropathy.  Do not plan to write for narcotics at discharge    Neurology note reviewed.  No evidence for seizure activity on EEG and MRI is negative.  Staring/aphasic episode potential panic attack versus occult seizure.  -  Continue tapering benzodiazepine dose down given extensive Xanax  abuse outpatient    Agree with psychiatric evaluation.  Await consult    Continue clonidine protocol for opiate withdrawal.  Since yesterday he has been expressing some hope that by going through withdrawal period here he might avoid using again at dc.    Anemia of chronic disease stable. Recheck in AM        Guarded overall prognosis.  SCDs  Dispo: Potentially home soon      Ishan Kaur MD  [facility]  04/14/24  13:49 EDT

## 2024-04-14 NOTE — PROGRESS NOTES
[facility] follow up d/t drugs; this writer reviewed chart and spoke with VONDA Phelps. Per RN, patient is doing pretty well; he slept overnight.  Last COWS 8 at 00:36; this clinician strongly encouraged inpatient psychiatrist, Dr. Mccoy, consult (RN agreed to contact on call provider for order). No further needs/concerns noted at this time per RN and/or medical team; [facility] to continue following.

## 2024-04-14 NOTE — PLAN OF CARE
Goal Outcome Evaluation:    Patient alert and oriented. Access and drains: IV. PRNs utilized during shift: norco 5mg PO x1, gabapentin 300mg PO x1. No clonidine interventions during shift. No complaints of nausea during shift. No acute adverse events during shift. Plan of care ongoing.             Progress: no change

## 2024-04-14 NOTE — PLAN OF CARE
Problem: Adult Inpatient Plan of Care  Goal: Plan of Care Review  Outcome: Ongoing, Progressing  Flowsheets (Taken 4/14/2024 5795)  Progress: no change  Plan of Care Reviewed With: patient  Outcome Evaluation: COWs score is at 11 or less today. Gave patient Gabpentin and Clondine x1 per order. Norco x1 for pain.  Pt is on room air. Pt can be bradycardic at times. Vanco given as order. Pt is stand by assist.  Reg diet, but not eating much. Psych, ID and Neuro following. Will continue to treat per MD     Problem: Adult Inpatient Plan of Care  Goal: Patient-Specific Goal (Individualized)  Outcome: Ongoing, Progressing  Flowsheets (Taken 4/13/2024 6515)  Patient-Specific Goals (Include Timeframe): to feel better  Individualized Care Needs: PRN and scheduled meds, assist x1

## 2024-04-15 LAB
ANION GAP SERPL CALCULATED.3IONS-SCNC: 11.3 MMOL/L (ref 5–15)
BUN SERPL-MCNC: 12 MG/DL (ref 6–20)
BUN/CREAT SERPL: 11.7 (ref 7–25)
CALCIUM SPEC-SCNC: 8.8 MG/DL (ref 8.6–10.5)
CHLORIDE SERPL-SCNC: 109 MMOL/L (ref 98–107)
CO2 SERPL-SCNC: 21.7 MMOL/L (ref 22–29)
CREAT SERPL-MCNC: 1.03 MG/DL (ref 0.76–1.27)
DEPRECATED RDW RBC AUTO: 44.4 FL (ref 37–54)
EGFRCR SERPLBLD CKD-EPI 2021: 92.4 ML/MIN/1.73
ERYTHROCYTE [DISTWIDTH] IN BLOOD BY AUTOMATED COUNT: 14.1 % (ref 12.3–15.4)
GLUCOSE SERPL-MCNC: 105 MG/DL (ref 65–99)
HCT VFR BLD AUTO: 32.4 % (ref 37.5–51)
HGB BLD-MCNC: 10.6 G/DL (ref 13–17.7)
MCH RBC QN AUTO: 28.2 PG (ref 26.6–33)
MCHC RBC AUTO-ENTMCNC: 32.7 G/DL (ref 31.5–35.7)
MCV RBC AUTO: 86.2 FL (ref 79–97)
PLATELET # BLD AUTO: 297 10*3/MM3 (ref 140–450)
PMV BLD AUTO: 9.4 FL (ref 6–12)
POTASSIUM SERPL-SCNC: 3.7 MMOL/L (ref 3.5–5.2)
RBC # BLD AUTO: 3.76 10*6/MM3 (ref 4.14–5.8)
SODIUM SERPL-SCNC: 142 MMOL/L (ref 136–145)
WBC NRBC COR # BLD AUTO: 6.04 10*3/MM3 (ref 3.4–10.8)

## 2024-04-15 PROCEDURE — 99231 SBSQ HOSP IP/OBS SF/LOW 25: CPT | Performed by: ORTHOPAEDIC SURGERY

## 2024-04-15 PROCEDURE — 25010000002 VANCOMYCIN 1 G RECONSTITUTED SOLUTION 1 EACH VIAL: Performed by: HOSPITALIST

## 2024-04-15 PROCEDURE — 80048 BASIC METABOLIC PNL TOTAL CA: CPT | Performed by: HOSPITALIST

## 2024-04-15 PROCEDURE — 97530 THERAPEUTIC ACTIVITIES: CPT

## 2024-04-15 PROCEDURE — 99232 SBSQ HOSP IP/OBS MODERATE 35: CPT | Performed by: INTERNAL MEDICINE

## 2024-04-15 PROCEDURE — 85027 COMPLETE CBC AUTOMATED: CPT | Performed by: HOSPITALIST

## 2024-04-15 PROCEDURE — 25810000003 SODIUM CHLORIDE 0.9 % SOLUTION 250 ML FLEX CONT: Performed by: HOSPITALIST

## 2024-04-15 RX ADMIN — GABAPENTIN 300 MG: 300 CAPSULE ORAL at 20:10

## 2024-04-15 RX ADMIN — VANCOMYCIN HYDROCHLORIDE 1000 MG: 1 INJECTION, POWDER, LYOPHILIZED, FOR SOLUTION INTRAVENOUS at 10:05

## 2024-04-15 RX ADMIN — LORAZEPAM 0.5 MG: 0.5 TABLET ORAL at 08:18

## 2024-04-15 RX ADMIN — GABAPENTIN 300 MG: 300 CAPSULE ORAL at 02:05

## 2024-04-15 RX ADMIN — Medication 5000 UNITS: at 08:18

## 2024-04-15 RX ADMIN — VANCOMYCIN HYDROCHLORIDE 1000 MG: 1 INJECTION, POWDER, LYOPHILIZED, FOR SOLUTION INTRAVENOUS at 22:22

## 2024-04-15 RX ADMIN — LORAZEPAM 0.5 MG: 0.5 TABLET ORAL at 23:12

## 2024-04-15 RX ADMIN — Medication 1 PATCH: at 08:17

## 2024-04-15 RX ADMIN — HYDROXYZINE HYDROCHLORIDE 25 MG: 25 TABLET ORAL at 02:08

## 2024-04-15 RX ADMIN — LORAZEPAM 0.5 MG: 0.5 TABLET ORAL at 15:30

## 2024-04-15 RX ADMIN — GABAPENTIN 300 MG: 300 CAPSULE ORAL at 10:05

## 2024-04-15 RX ADMIN — HYDROCODONE BITARTRATE AND ACETAMINOPHEN 1 TABLET: 5; 325 TABLET ORAL at 12:13

## 2024-04-15 RX ADMIN — HYDROCODONE BITARTRATE AND ACETAMINOPHEN 1 TABLET: 5; 325 TABLET ORAL at 06:34

## 2024-04-15 RX ADMIN — CLONIDINE HYDROCHLORIDE 0.1 MG: 0.1 TABLET ORAL at 02:47

## 2024-04-15 RX ADMIN — HYDROCODONE BITARTRATE AND ACETAMINOPHEN 1 TABLET: 5; 325 TABLET ORAL at 18:05

## 2024-04-15 NOTE — PROGRESS NOTES
Name: yRan Marina ADMIT: 2024   : 1980  PCP: Primitivo Link MD    MRN: [id number] LOS: 1 days   AGE/SEX: 43 y.o. male  ROOM: [facility]     Subjective   Subjective   Patient seen this morning, feeling better..  Bilateral upper extremity wounds unchanged, no fevers no chills.  No new drainage or swelling.    Review of Systems   As above  Objective   Objective   Vital Signs  Temp:  [97 °F (36.1 °C)-98.9 °F (37.2 °C)] 98.9 °F (37.2 °C)  Heart Rate:  [53-90] 71  Resp:  [16-18] 18  BP: (120-145)/(70-85) 132/77  SpO2:  [97 %-99 %] 99 %  on   ;   Device (Oxygen Therapy): room air  Body mass index is 20.63 kg/m².  Physical Exam    General: Alert and oriented x3, no acute distress  HEENT: Normocephalic, atraumatic  CV: Regular rate and rhythm, no murmurs rubs or gallops  Lungs: Clear to auscultation bilaterally, no crackles or wheezes  Abdomen: Soft, nontender, nondistended  Extremities: Multiple track marks in bilateral upper extremities with necrotic tissue/eschar.  Erythema in right upper extremity improved per report.  No new drainage/open wounds  per patient.     Results Review     I reviewed the patient's new clinical results.  Results from last 7 days   Lab Units 04/15/24  0802 24  0540 24  0610 04/11/24  1437   WBC 10*3/mm3 6.04 4.82 3.55 4.81   HEMOGLOBIN g/dL 10.6* 8.5* 8.6* 8.9*   PLATELETS 10*3/mm3 297 244 221 255     Results from last 7 days   Lab Units 04/15/24  0802 24  0540 24  0610 24  1437   SODIUM mmol/L 142 146* 141 139   POTASSIUM mmol/L 3.7 3.8 3.6 3.6   CHLORIDE mmol/L 109* 116* 110* 106   CO2 mmol/L 21.7* 21.1* 21.2* 24.0   BUN mg/dL 12 9 8 11   CREATININE mg/dL 1.03 0.90 0.98 1.17   GLUCOSE mg/dL 105* 104* 148* 133*   Estimated Creatinine Clearance: 90.3 mL/min (by C-G formula based on SCr of 1.03 mg/dL).  Results from last 7 days   Lab Units 24  1437   ALBUMIN g/dL 3.5   BILIRUBIN mg/dL 0.4   ALK PHOS U/L 129*   AST (SGOT) U/L 64*   ALT (SGPT)  "U/L 25     Results from last 7 days   Lab Units 04/15/24  0802 04/13/24  0540 04/12/24  0610 04/11/24  1437   CALCIUM mg/dL 8.8 8.5* 7.9* 8.3*   ALBUMIN g/dL  --   --   --  3.5     Results from last 7 days   Lab Units 04/11/24  1437   PROCALCITONIN ng/mL 0.18   LACTATE mmol/L 1.4     COVID19   Date Value Ref Range Status   04/11/2024 Not Detected Not Detected - Ref. Range Final     SARS-CoV-2, [facility]SCAR   Date Value Ref Range Status   12/15/2022 NEGATIVE Negative Final     Comment:     The 2019-CoV rRT-PCR Assay is only for use under a Food and Drug Administration Emergency Use Authorization. The performance characteristics of the assay were verified by the Clinical Laboratory at [facility]. Results should be used in   conjunction with the patient's clinical symptoms, medical history, and other clinical/laboratory findings to determine an overall clinical diagnosis. Negative results do not preclude infection with SARS-CoV-2 (COVID-19).    Test parameters have not been validated for screening asymptomatic patients.     No results found for: \"HGBA1C\", \"POCGLU\"        MRI Brain Without Contrast, MRI Angiogram Head Without Contrast  Narrative: MRI OF THE BRAIN WITHOUT CONTRAST AND MRA OF THE HEAD WITHOUT CONTRAST     CLINICAL HISTORY: Transient aphasia and generalized weakness for 10  minutes.     TECHNIQUE: MRI of the brain was obtained with sagittal T1, axial T1,  axial FLAIR, axial T2, axial diffusion, and axial susceptibility  weighted images.     FINDINGS:     The axial FLAIR sequence is compromised by motion artifact. The  ventricles, sulci, and cisterns are age appropriate. There are no  abnormal foci of restricted diffusion within the brain parenchyma. The  major intracranial flow related signal voids are within normal limits.  There are no abnormal foci of susceptibility artifact. No significant  white matter abnormalities are identified. The midline intracranial  anatomy is within normal limits.   "   Incidental note is made of a trace amount of nonspecific fluid signal  intensity within the right mastoid air cells that is statistically  likely representative of an incidental mastoid effusion. Minor mucosal  thickening is noted within the ethmoid air cells.     Impression:    Essentially unremarkable MRI of the brain.        TECHNIQUE: MRA of the head was obtained with 3D time-of-flight imaging  technique. Maximum intensity projection reconstructed images were  obtained.     FINDINGS:     There is normal flow related enhancement within the vertebral arteries,  basilar artery, and posterior cerebral arteries. The internal carotid  arteries, middle cerebral arteries, and anterior cerebral arteries also  have normal flow related enhancement.     IMPRESSION:     Unremarkable MRA of the head.     This report was finalized on 4/13/2024 6:47 PM by Dr. Layo Brumfield M.D  on Workstation: [id number]     EEG  Date of onset: 4/13/24  Date of offset: 4/13/24    Indication: transient alteration of awareness    Technical description:  This is a 21 channel digital EEG recording that   began on 1521 and ended on 1547.     Background:  Up to 11 Hz alpha activity is noted over the posterior head   regions that is symmetric, well formed and reactive to eye closure.  The   patient enters the drowsy state and sleeps during the record.  Sleep   activities are symmetric and contain sleep spindles.  Hyperventilation was   not performed and photic stimulation did not induce an abnormal driving   response.  There are no marked continuous asymmetries between the two   hemispheres.  No interictal epileptiform activity is present. Excessive   beta is seen.    The EKG monitor shows a heart rate that varies within normal limits.    Clinical Interpretation:  This routine EEG recording is normal in the   awake and sleep states.  No potentially epileptogenic activity, seizure   activity, or focal slowing is present. In addition, excessive beta is    seen; this is usually a medication effect, often seen with medications   such as benzodiazepines.    Scheduled Medications  cholecalciferol, 5,000 Units, Oral, Daily  LORazepam, 0.5 mg, Oral, Q8H  nicotine, 1 patch, Transdermal, Q24H  vancomycin, 1,000 mg, Intravenous, Q12H    Infusions  Pharmacy to dose vancomycin,     Diet  Diet: Regular/House; Fluid Consistency: Thin (IDDSI 0)    I have personally reviewed     [x]  Laboratory   [x]  Microbiology   [x]  Radiology   []  EKG/Telemetry  []  Cardiology/Vascular   []  Pathology    []  Records       Assessment/Plan     Active Hospital Problems    Diagnosis  POA    **Cellulitis of arm [L03.119]  Yes    Opioid use disorder, severe, dependence [F11.20]  Yes    Benzodiazepine abuse [F13.10]  Yes    Anemia, chronic disease [D63.8]  Unknown    CKD (chronic kidney disease) stage 2, GFR 60-89 ml/min [N18.2]  Unknown      Resolved Hospital Problems   No resolved problems to display.       Patient is a 43-year-old male history of IV drug use, CKD , presented  to the hospital with complaints of fevers, chills and malaise.    Bilateral upper extremity wounds  -CT with concern for fluid collection in the olecranon bursa on the right   -On IV vancomycin  -Orthopedic surgery and ID following  -Referral placed to [facility] per orthopedic surgery recommendations      Anemia of chronic disease  -Iron panel 04/13/2024 consistent with anemia of chronic disease  -Hemoglobin stable  -Previously folate level was low 06/2023, continue supplement, marlyn-Vitamin B12 low end of normal, will repeat vitamin B12 and folate in a.m.      CKD stage II  -Creatinine stable, monitor    Postoperative abuse  -Urine drug screen was positive for benzodiazepines, fentanyl  -Access following  -Neurology evaluated due to due to status/aphasic episode,.  No evidence for seizure activity on EEG and MRI is negative.  Staring/aphasic episode potential panic attack versus occult seizure.  -Continue  tapering benzodiazepine dose down given extensive Xanax abuse outpatient  -May benefit from SSRIs in the future per neurology        SCDs for DVT prophylaxis.  Full code.  Discussed with patient.  Anticipate discharge home in 1-2 days.  If cleared by consultants and pending final antibiotic recommendations per ID          Copied text in this note has been reviewed and is accurate as of 04/15/24.         Dictated utilizing Dragon dictation        Bee Rees MD  [facility]  04/15/24  12:58 EDT

## 2024-04-15 NOTE — PROGRESS NOTES
Discharge Planning Assessment  [facility]     Patient Name: Ryan Marina  MRN: [id number]  Today's Date: 4/15/2024    Admit Date: 4/11/2024        Discharge Needs Assessment    No documentation.                  Discharge Plan       Row Name 04/15/24 1327       Plan    Plan Comments Spoke with Trish with [facility] [phone] and he has an appointment on 5/1/24 @ 08:30 at the [address] [place] [address], [address], [place], [place] TARSHA Duncan RN, CCP                  Continued Care and Services - Admitted Since 4/11/2024    No active coordination exists for this encounter.       Expected Discharge Date and Time       Expected Discharge Date Expected Discharge Time    Apr 15, 2024            Demographic Summary    No documentation.                  Functional Status    No documentation.                  Psychosocial    No documentation.                  Abuse/Neglect    No documentation.                  Legal    No documentation.                  Substance Abuse    No documentation.                  Patient Forms    No documentation.                     Jie Duncan RN

## 2024-04-15 NOTE — PROGRESS NOTES
LOS: 1 day     Chief Complaint:  upper extremity wounds     Interval History: Afebrile, tolerating antibiotics without vomiting diarrhea or rash.  Has episodes of panic.  Reports some nausea no vomiting    Vital Signs  Temp:  [97 °F (36.1 °C)-98.9 °F (37.2 °C)] 98.9 °F (37.2 °C)  Heart Rate:  [53-90] 90  Resp:  [16] 16  BP: (120-145)/(70-85) 133/83    Physical Exam:  General: In no acute distress  Cardiovascular: Normal rhythm and rate  Respiratory: Breathing comfortably on room air  GI: Soft, NT/ND,   Skin: Numerous scabbed over areas in the upper extremities with areas of necrosis bilaterally positive track marks no purulence    Antibiotics:  Vancomycin dosing per pharmacy     Results Review:    Lab Results   Component Value Date    WBC 6.04 04/15/2024    HGB 10.6 (L) 04/15/2024    HCT 32.4 (L) 04/15/2024    MCV 86.2 04/15/2024     04/15/2024     Lab Results   Component Value Date    GLUCOSE 105 (H) 04/15/2024    BUN 12 04/15/2024    CREATININE 1.03 04/15/2024    EGFRIFAFRI 51 (L) 01/22/2023    BCR 11.7 04/15/2024    CO2 21.7 (L) 04/15/2024    CALCIUM 8.8 04/15/2024    ALBUMIN 3.5 04/11/2024    LABIL2 0.9 (L) 01/22/2023    AST 64 (H) 04/11/2024    ALT 25 04/11/2024       Microbiology:  4/11 BCx NGTD x 2  4/11 COVID, flu and RSV negative    Assessment & Plan   #Bilateral upper extremity wounds  #IVDU with predominantly heroin and reportedly used methadone in the past    Patient will need evaluation by hand surgery/plastics.  At this time there is no evidence of deep infection.  Continue vancomycin dosing per pharmacy.  Today is day 5 of empiric therapy.  Recommend a total of 7-day course.  Can discharge on oral Zyvox 600 mg p.o. twice daily if the patient gets discharged prior to completing his antibiotics.    ID will sign off.  Please do not hesitate to call us with further questions or concerns

## 2024-04-15 NOTE — PROGRESS NOTES
Orthopedic Progress Note      Patient: Ryan Marina    YOB: 1980    Medical Record Number: [id number]    Attending Physician: Bee Rees MD    Date of Admission: 2024  1:42 PM    Admitting Dx:  Narcotic dependence [F11.20]  Cellulitis of arm [L03.119]  Chronic anemia [D64.9]  Cellulitis of left arm [L03.114]  Cellulitis of right arm [L03.113]  IV drug abuse [F19.10]  Non-traumatic rhabdomyolysis [M62.82]      Patient seen and examined.  He is resting comfortably.  States he feels much improved.  He was glad that he stayed as well.      Current Problem List:   Cellulitis of arm    Opioid use disorder, severe, dependence    Benzodiazepine abuse    Anemia, chronic disease    CKD (chronic kidney disease) stage 2, GFR 60-89 ml/min      Past Medical History:   Diagnosis Date    Anemia     Arthritis     CHF (congestive heart failure)     Renal disorder     Vitamin D deficiency        Current Medications:  Scheduled Meds:cholecalciferol, 5,000 Units, Oral, Daily  LORazepam, 0.5 mg, Oral, Q8H  nicotine, 1 patch, Transdermal, Q24H  vancomycin, 1,000 mg, Intravenous, Q12H      PRN Meds:.  acetaminophen **OR** acetaminophen **OR** acetaminophen    senna-docusate sodium **AND** polyethylene glycol **AND** bisacodyl **AND** bisacodyl    [] cloNIDine **FOLLOWED BY** [] cloNIDine **FOLLOWED BY** [] cloNIDine **FOLLOWED BY** [] cloNIDine **FOLLOWED BY** cloNIDine    gabapentin    HYDROcodone-acetaminophen    hydrOXYzine    Pharmacy to dose vancomycin    [COMPLETED] Insert Peripheral IV **AND** sodium chloride    SUBJECTIVE: 43 y.o.  male    OBJECTIVE:   Vitals:    04/15/24 0243 04/15/24 0325 04/15/24 0700 04/15/24 1015   BP: 145/80 131/79 133/83 132/77   BP Location: Left arm Left arm Left arm Left arm   Patient Position: Sitting Lying Lying Lying   Pulse: 74 65 90 71   Resp:   16 18   Temp:   98.9 °F (37.2 °C)    TempSrc:   Oral    SpO2:   98% 99%   Weight:   69 kg (152  lb 1.9 oz)    Height:         I/O last 3 completed shifts:  In: 730 [P.O.:480; IV Piggyback:250]  Out: -     Diagnostic Tests:  Lab Results (last 72 hours)       Procedure Component Value Units Date/Time    Basic Metabolic Panel [[id number]]  (Abnormal) Collected: 04/15/24 0802    Specimen: Blood Updated: 04/15/24 0851     Glucose 105 mg/dL      BUN 12 mg/dL      Creatinine 1.03 mg/dL      Sodium 142 mmol/L      Potassium 3.7 mmol/L      Chloride 109 mmol/L      CO2 21.7 mmol/L      Calcium 8.8 mg/dL      BUN/Creatinine Ratio 11.7     Anion Gap 11.3 mmol/L      eGFR 92.4 mL/min/1.73     Narrative:      GFR Normal >60  Chronic Kidney Disease <60  Kidney Failure <15      CBC (No Diff) [[id number]]  (Abnormal) Collected: 04/15/24 0802    Specimen: Blood Updated: 04/15/24 0830     WBC 6.04 10*3/mm3      RBC 3.76 10*6/mm3      Hemoglobin 10.6 g/dL      Hematocrit 32.4 %      MCV 86.2 fL      MCH 28.2 pg      MCHC 32.7 g/dL      RDW 14.1 %      RDW-SD 44.4 fl      MPV 9.4 fL      Platelets 297 10*3/mm3     Blood Culture - Blood, Arm, Right [[id number]]  (Normal) Collected: 04/11/24 1437    Specimen: Blood from Arm, Right Updated: 04/14/24 1500     Blood Culture No growth at 3 days    Blood Culture - Blood, Arm, Left [[id number]]  (Normal) Collected: 04/11/24 1445    Specimen: Blood from Arm, Left Updated: 04/14/24 1500     Blood Culture No growth at 3 days    Vancomycin, Trough Obtain Vancomycin trough 30 minutes PRIOR to dose. Make sure Vancomycin is not infusing.  Thank you! [[id number]]  (Normal) Collected: 04/14/24 0757    Specimen: Blood Updated: 04/14/24 0836     Vancomycin Trough 13.90 mcg/mL     Narrative:      Therapeutic Ranges for Vancomycin    Vancomycin Random   5.0-40.0 mcg/mL  Vancomycin Trough   5.0-20.0 mcg/mL  Vancomycin Peak     20.0-40.0 mcg/mL    Basic Metabolic Panel [[id number]]  (Abnormal) Collected: 04/13/24 0540    Specimen: Blood Updated: 04/13/24 0623     Glucose 104 mg/dL      BUN 9 mg/dL       Creatinine 0.90 mg/dL      Sodium 146 mmol/L      Potassium 3.8 mmol/L      Chloride 116 mmol/L      CO2 21.1 mmol/L      Calcium 8.5 mg/dL      BUN/Creatinine Ratio 10.0     Anion Gap 8.9 mmol/L      eGFR 108.7 mL/min/1.73     Narrative:      GFR Normal >60  Chronic Kidney Disease <60  Kidney Failure <15      Iron Profile [[id number]]  (Abnormal) Collected: 04/13/24 0540    Specimen: Blood Updated: 04/13/24 0618     Iron 68 mcg/dL      Iron Saturation (TSAT) 24 %      Transferrin 193 mg/dL      TIBC 288 mcg/dL     Vancomycin, Trough [[id number]]  (Normal) Collected: 04/13/24 0540    Specimen: Blood Updated: 04/13/24 0618     Vancomycin Trough 12.90 mcg/mL     Narrative:      Therapeutic Ranges for Vancomycin    Vancomycin Random   5.0-40.0 mcg/mL  Vancomycin Trough   5.0-20.0 mcg/mL  Vancomycin Peak     20.0-40.0 mcg/mL    C-reactive Protein [[id number]]  (Abnormal) Collected: 04/13/24 0540    Specimen: Blood Updated: 04/13/24 0618     C-Reactive Protein 1.00 mg/dL     CBC (No Diff) [[id number]]  (Abnormal) Collected: 04/13/24 0540    Specimen: Blood Updated: 04/13/24 0556     WBC 4.82 10*3/mm3      RBC 3.12 10*6/mm3      Hemoglobin 8.5 g/dL      Hematocrit 26.2 %      MCV 84.0 fL      MCH 27.2 pg      MCHC 32.4 g/dL      RDW 13.3 %      RDW-SD 40.5 fl      MPV 9.3 fL      Platelets 244 10*3/mm3     Urine Drug Screen - Urine, Clean Catch [[id number]]  (Abnormal) Collected: 04/12/24 1756    Specimen: Urine, Clean Catch Updated: 04/12/24 1831     Amphet/Methamphet, Screen Negative     Barbiturates Screen, Urine Negative     Benzodiazepine Screen, Urine Positive     Cocaine Screen, Urine Negative     Opiate Screen Negative     THC, Screen, Urine Negative     Methadone Screen, Urine Negative     Oxycodone Screen, Urine Negative     Fentanyl, Urine Positive    Narrative:      Negative Thresholds Per Drugs Screened:    Amphetamines                 500 ng/ml  Barbiturates                 200 ng/ml  Benzodiazepines               100 ng/ml  Cocaine                      300 ng/ml  Methadone                    300 ng/ml  Opiates                      300 ng/ml  Oxycodone                    100 ng/ml  THC                           50 ng/ml  Fentanyl                       5 ng/ml      The Normal Value for all drugs tested is negative. This report includes final unconfirmed screening results to be used for medical treatment purposes only. Unconfirmed results must not be used for non-medical purposes such as employment or legal testing. Clinical consideration should be applied to any drug of abuse test, particularly when unconfirmed results are used.            Urinalysis With Microscopic If Indicated (No Culture) - Urine, Clean Catch [[id number]]  (Normal) Collected: 04/12/24 1756    Specimen: Urine, Clean Catch Updated: 04/12/24 1817     Color, UA Yellow     Appearance, UA Clear     pH, UA 7.5     Specific Gravity, UA 1.027     Glucose, UA Negative     Ketones, UA Negative     Bilirubin, UA Negative     Blood, UA Negative     Protein, UA Negative     Leuk Esterase, UA Negative     Nitrite, UA Negative     Urobilinogen, UA 1.0 E.U./dL    Narrative:      Urine microscopic not indicated.    Vancomycin, Trough [[id number]]  (Normal) Collected: 04/12/24 1700    Specimen: Blood Updated: 04/12/24 1749     Vancomycin Trough 12.50 mcg/mL     Narrative:      Therapeutic Ranges for Vancomycin    Vancomycin Random   5.0-40.0 mcg/mL  Vancomycin Trough   5.0-20.0 mcg/mL  Vancomycin Peak     20.0-40.0 mcg/mL             PHYSICAL EXAM:  Feeling better today.  Erythema and swelling to RUE better.  Left upper extremity also doing well.  He has no tenderness to the elbows.  Minimal swelling in the right olecranon bursa but no redness or breaks in skin.  He has full elbow range of motion.  Wounds, scabs/eschar appear to be stable condition.  No drainage noted.  Neurovascular exam remains within normal limits.  Minimal pain.       ASSESSMENT & PLAN:  Have discussed  with the patient that he will need to follow up with a dedicated hand surgeon.  Referral placed for [facility].  If surgery is needed would also recommend plastic surgeon.  Patient is improving on antibiotics.  If symptoms reoccur the would recommend repeat CT or MRI of arm to evaluate for possible abscess.  I did go over all this with the patient and that we will establish her recommend referral for hand surgery, plastics.  He is to be mindful as if any symptoms recur or things change or worsen further evaluation with possible repeat imaging of the upper extremities could be warranted however he seems to be much improved to continue to improve from when initially seen.  He is understanding and all questions were answered and understood.        Date: 4/15/2024    VONDA Solis MD

## 2024-04-15 NOTE — THERAPY DISCHARGE NOTE
Patient Name: Ryan Marina  : 1980    MRN: [id number]                              Today's Date: 4/15/2024       Admit Date: 2024    Visit Dx:     ICD-10-CM ICD-9-CM   1. Cellulitis of left arm  L03.114 682.3   2. Cellulitis of right arm  L03.113 682.3   3. IV drug abuse  F19.10 305.90   4. Chronic anemia  D64.9 285.9   5. Non-traumatic rhabdomyolysis  M62.82 728.88   6. 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    Vitamin B12 deficiency    Vitamin D deficiency    History of acute bronchitis    History of acute sinusitis    History of Depression with anxiety    Rhabdomyolysis    Cellulitis of arm    Opioid use disorder, severe, dependence    Benzodiazepine abuse    Anemia, chronic disease    CKD (chronic kidney disease) stage 2, GFR 60-89 ml/min     Past Medical History:   Diagnosis Date    Anemia     Arthritis     CHF (congestive heart failure)     Renal disorder     Vitamin D deficiency      Past Surgical History:   Procedure Laterality Date    CHOLECYSTECTOMY  10/13/2009    10/13/2009--laparoscopic cholecystectomy.    FRACTURE SURGERY      KNEE ACL RECONSTRUCTION       and --anterior cruciate ligament reconstruction right knee.      General Information       Row Name 04/15/24 1120          Physical Therapy Time and Intention    Document Type therapy note (daily note)  -CW     Mode of Treatment physical therapy;individual therapy  -CW       Row Name 04/15/24 1120          General Information    Patient Profile Reviewed yes  -CW     Existing Precautions/Restrictions fall  -CW       Row Name 04/15/24 1120          Cognition    Orientation Status (Cognition) oriented x 4  -CW       Row Name 04/15/24 1120          Safety Issues,  Functional Mobility    Impairments Affecting Function (Mobility) pain  -CW               User Key  (r) = Recorded By, (t) = Taken By, (c) = Cosigned By      Initials Name Provider Type    Anamaria Marrufo PT Physical Therapist                   Mobility       Row Name 04/15/24 1131          Bed Mobility    Bed Mobility supine-sit;sit-supine  -CW     Supine-Sit [place] (Bed Mobility) independent  -CW     Sit-Supine [place] (Bed Mobility) independent  -CW     Assistive Device (Bed Mobility) head of bed elevated  -CW       Row Name 04/15/24 1131          Sit-Stand Transfer    Sit-Stand [place] (Transfers) standby assist  -CW     Comment, (Sit-Stand Transfer) no AD  -CW       Row Name 04/15/24 1131          Gait/Stairs (Locomotion)    [place] Level (Gait) verbal cues;contact guard  -CW     Distance in Feet (Gait) 400  -CW     Deviations/Abnormal Patterns (Gait) praveen decreased;gait speed decreased;antalgic  -CW     Comment, (Gait/Stairs) Slight antalgic gait due to ankle pain  -CW               User Key  (r) = Recorded By, (t) = Taken By, (c) = Cosigned By      Initials Name Provider Type    Anamaria Marrufo PT Physical Therapist                   Obj/Interventions       Row Name 04/15/24 1141          Balance    Balance Assessment standing static balance;standing dynamic balance  -CW     Static Standing Balance standby assist  -CW     Dynamic Standing Balance standby assist  -CW     Position/Device Used, Standing Balance supported  -CW               User Key  (r) = Recorded By, (t) = Taken By, (c) = Cosigned By      Initials Name Provider Type    Anamaria Marrufo PT Physical Therapist                   Goals/Plan    No documentation.                  Clinical Impression       Row Name 04/15/24 1141          Pain    Pretreatment Pain Rating 0/10 - no pain  -CW     Pre/Posttreatment Pain Comment ankle pain with weight bearing  -CW     Pain Intervention(s)  Repositioned;Rest;Ambulation/increased activity  -CW       Row Name 04/15/24 1141          Plan of Care Review    Plan of Care Reviewed With patient  -CW     Outcome Evaluation Pt seen for PT this morning. Pt mobilzing well today, he completed bed mobility independently and ambulated 400' sba without AD. Slight antalgic gait noted but no balance deficits observed. No further PT needs identified, PT signing off. Notified RN. Plans home for DC.  -CW       Row Name 04/15/24 1141          Vital Signs    O2 Delivery Pre Treatment room air  -CW     O2 Delivery Intra Treatment room air  -CW     O2 Delivery Post Treatment room air  -CW       Row Name 04/15/24 1141          Positioning and Restraints    Pre-Treatment Position in bed  -CW     Post Treatment Position bed  -CW     In Bed notified nsg;call light within reach;encouraged to call for assist;exit alarm on;fowlers  alarm on per RN  -CW               User Key  (r) = Recorded By, (t) = Taken By, (c) = Cosigned By      Initials Name Provider Type    CW Anamaria Cazares, PT Physical Therapist                   Outcome Measures       Row Name 04/15/24 1150 04/15/24 0817       How much help from another person do you currently need...    Turning from your back to your side while in flat bed without using bedrails? 4  -CW 4  -ELDER    Moving from lying on back to sitting on the side of a flat bed without bedrails? 4  -CW 4  -ELDER    Moving to and from a bed to a chair (including a wheelchair)? 4  -CW 4  -ELDER    Standing up from a chair using your arms (e.g., wheelchair, bedside chair)? 4  -CW 4  -ELDER    Climbing 3-5 steps with a railing? 4  -CW 3  -ELDER    To walk in hospital room? 4  -CW 3  -ELDER    AM-PAC 6 Clicks Score (PT) 24  -CW 22  -ELDER    Highest Level of Mobility Goal 8 --> Walked 250 feet or more  -CW 7 --> Walk 25 feet or more  -ELDER      Row Name 04/15/24 1150          Functional Assessment    Outcome Measure Options AM-PAC 6 Clicks Basic Mobility (PT)  -CW                User Key  (r) = Recorded By, (t) = Taken By, (c) = Cosigned By      Initials Name Provider Type    Rani Donaldson, RN Registered Nurse    Anamaria Marrufo PT Physical Therapist                                 Physical Therapy Education       Title: PT OT SLP Therapies (In Progress)       Topic: Physical Therapy (In Progress)       Point: Mobility training (Done)       Learning Progress Summary             Patient Acceptance, E, VU by CW at 4/15/2024 1153    Acceptance, E, VU by CW at 4/12/2024 1536                         Point: Home exercise program (Not Started)       Learner Progress:  Not documented in this visit.              Point: Body mechanics (Not Started)       Learner Progress:  Not documented in this visit.              Point: Precautions (Not Started)       Learner Progress:  Not documented in this visit.                              User Key       Initials Effective Dates Name Provider Type Discipline     12/13/22 -  Anamaria Cazares PT Physical Therapist PT                  PT Recommendation and Plan  Planned Therapy Interventions (PT): balance training, bed mobility training, gait training, ROM (range of motion), strengthening, patient/family education, transfer training  Plan of Care Reviewed With: patient  Outcome Evaluation: Pt seen for PT this morning. Pt mobilzing well today, he completed bed mobility independently and ambulated 400' sba without AD. Slight antalgic gait noted but no balance deficits observed. No further PT needs identified, PT signing off. Notified RN. Plans home for DC.     Time Calculation:         PT Charges       Row Name 04/15/24 1155             Time Calculation    Start Time 1019  -CW      Stop Time 1031  -CW      Time Calculation (min) 12 min  -CW      PT Received On 04/15/24  -CW                User Key  (r) = Recorded By, (t) = Taken By, (c) = Cosigned By      Initials Name Provider Type    Anamaria Marrufo PT Physical Therapist                   Therapy Charges for Today       Code Description Service Date Service Provider Modifiers Qty    [id number]  PT THERAPEUTIC ACT EA 15 MIN 4/15/2024 Anamaria Cazares, PT GP 1            PT G-Codes  Outcome Measure Options: AM-PAC 6 Clicks Basic Mobility (PT)  AM-PAC 6 Clicks Score (PT): 24  AM-PAC 6 Clicks Score (OT): 23  PT Discharge Summary  Anticipated Discharge Disposition (PT): home with assist, home with home health    Anamaria Cazares, TONIO  4/15/2024

## 2024-04-15 NOTE — PLAN OF CARE
Goal Outcome Evaluation:  Plan of Care Reviewed With: patient           Outcome Evaluation: Pt seen for PT this morning. Pt mobilzing well today, he completed bed mobility independently and ambulated 400' sba without AD. Slight antalgic gait noted but no balance deficits observed. No further PT needs identified, PT signing off. Notified RN. Plans home for DC.      Anticipated Discharge Disposition (PT): home with assist, home with home health

## 2024-04-15 NOTE — PROGRESS NOTES
Discharge Planning Assessment  [facility]     Patient Name: Ryan Marina  MRN: [id number]  Today's Date: 4/15/2024    Admit Date: 4/11/2024    Plan: home and his father will provide the transportation at the time of discharge   Discharge Needs Assessment       Row Name 04/15/24 1342       Living Environment    Living Arrangement Comments lives with his father       Resource/Environmental Concerns    Resource/Environmental Concerns other (see comments)       Transition Planning    Patient/Family Anticipates Transition to home with family       Discharge Needs Assessment    Outpatient/Agency/Support Group Needs outpatient substance abuse treatment    Current Discharge Risk chronically ill;substance use/abuse      Row Name 04/15/24 1336       Living Environment    People in Home parent(s)    Name(s) of People in Home lives with his Dad    Current Living Arrangements home    Potentially Unsafe Housing Conditions none    Quality of Family Relationships helpful;involved;supportive    Able to Return to Prior Arrangements yes       Resource/Environmental Concerns    Resource/Environmental Concerns none    Transportation Concerns rides, unreliable from others       Transition Planning    Patient/Family Anticipates Transition to home with family    Patient/Family Anticipated Services at Transition none       Discharge Needs Assessment    Equipment Currently Used at Home cane, straight;cane, quad tip;bp cuff;crutches, axillary    Concerns to be Addressed decision-making    Equipment Needed After Discharge none                   Discharge Plan       Row Name 04/15/24 7363       Plan    Plan home and his father will provide the transportation at the time of discharge    Plan Comments Spoke with the patient and verified the face sheet. He states his father can provide the transportation at discharge. He lives in his father's home. Updated him on his appointment with [facility] and he states he can get transportation to  the office it is only 10 minutes away from his home. He denies needing any DME or discharge planning at this time. [facility] is following. TARSHA Duncan Rn, CCP.      Row Name 04/15/24 1327       Plan    Plan Comments Spoke with Trish with [facility] [phone] and he has an appointment on 5/1/24 @ 08:30 at the [address] [place] [address] at [facility] on the 4th floor. TARSHA Duncan RN, CCP                  Continued Care and Services - Admitted Since 4/11/2024    No active coordination exists for this encounter.       Expected Discharge Date and Time       Expected Discharge Date Expected Discharge Time    Apr 18, 2024            Demographic Summary       Row Name 04/15/24 1336       General Information    Admission Type inpatient    Arrived From emergency department    Required Notices Provided Important Message from Medicare    Referral Source admission list    Reason for Consult discharge planning    Preferred Language English       Contact Information    Permission Granted to Share Info With     Contact Information Obtained for                    Functional Status       Row Name 04/15/24 1343       Functional Status, IADL    Medications independent      Row Name 04/15/24 1340       Functional Status    Usual Activity Tolerance moderate    Current Activity Tolerance moderate       Functional Status, IADL    Meal Preparation independent    Housekeeping independent    Laundry independent    Shopping independent    IADL Comments IADL's on disability       Mental Status    General Appearance WDL WDL       Mental Status Summary    Recent Changes in Mental Status/Cognitive Functioning no changes       Employment/    Employment Status disabled    Employment/ Comments none                   Psychosocial    No documentation.                  Abuse/Neglect    No documentation.                  Legal    No documentation.                  Substance Abuse    No  documentation.                  Patient Forms    No documentation.                     Jie Duncan RN

## 2024-04-15 NOTE — DISCHARGE INSTR - APPOINTMENTS
Spoke with Trish with [facility] [phone] and he has an appointment on 5/1/24 @ 08:30 at the [address] [place] [address], [address], [place], [place] TARSHA Duncan RN, CCP

## 2024-04-15 NOTE — PLAN OF CARE
Vss, RA, up with standby assist, A&O, cooperative. PRN norco, gabapentin for c/o pain. IV abx. No current signs of distress

## 2024-04-15 NOTE — NURSING NOTE
[facility] follow up regarding drug use: Last COWS score of 11. Pt has declined [facility] resources but [facility] has encouraged on multiple encounters. Some agitation overnight. Following.

## 2024-04-15 NOTE — PLAN OF CARE
Goal Outcome Evaluation:   Patient alert and oriented. Access and drains: IV. PRNs utilized during shift: norco 5mg PO x1, gabapentin 300mg PO x1, 25mg atarax PO x1, 0.1mg clonidine PO x1 for COWs score of 11. No complaints of nausea during shift. No acute adverse events during shift. Plan of care ongoing.             Progress: no change

## 2024-04-16 ENCOUNTER — READMISSION MANAGEMENT (OUTPATIENT)
Dept: CALL CENTER | Facility: HOSPITAL | Age: 44
End: 2024-04-16
Payer: MEDICARE

## 2024-04-16 VITALS
WEIGHT: 152.12 LBS | RESPIRATION RATE: 16 BRPM | BODY MASS INDEX: 20.6 KG/M2 | DIASTOLIC BLOOD PRESSURE: 89 MMHG | OXYGEN SATURATION: 99 % | TEMPERATURE: 97.6 F | SYSTOLIC BLOOD PRESSURE: 130 MMHG | HEART RATE: 60 BPM | HEIGHT: 72 IN

## 2024-04-16 LAB
ANION GAP SERPL CALCULATED.3IONS-SCNC: 10 MMOL/L (ref 5–15)
BACTERIA SPEC AEROBE CULT: NORMAL
BACTERIA SPEC AEROBE CULT: NORMAL
BUN SERPL-MCNC: 14 MG/DL (ref 6–20)
BUN/CREAT SERPL: 12.3 (ref 7–25)
CALCIUM SPEC-SCNC: 9.2 MG/DL (ref 8.6–10.5)
CHLORIDE SERPL-SCNC: 109 MMOL/L (ref 98–107)
CO2 SERPL-SCNC: 21 MMOL/L (ref 22–29)
CREAT SERPL-MCNC: 1.14 MG/DL (ref 0.76–1.27)
DEPRECATED RDW RBC AUTO: 43.1 FL (ref 37–54)
EGFRCR SERPLBLD CKD-EPI 2021: 81.8 ML/MIN/1.73
ERYTHROCYTE [DISTWIDTH] IN BLOOD BY AUTOMATED COUNT: 14.3 % (ref 12.3–15.4)
FOLATE SERPL-MCNC: 12.9 NG/ML (ref 4.78–24.2)
GLUCOSE SERPL-MCNC: 101 MG/DL (ref 65–99)
HCT VFR BLD AUTO: 32.8 % (ref 37.5–51)
HGB BLD-MCNC: 10.9 G/DL (ref 13–17.7)
MCH RBC QN AUTO: 28.2 PG (ref 26.6–33)
MCHC RBC AUTO-ENTMCNC: 33.2 G/DL (ref 31.5–35.7)
MCV RBC AUTO: 84.8 FL (ref 79–97)
PLATELET # BLD AUTO: 374 10*3/MM3 (ref 140–450)
PMV BLD AUTO: 9.3 FL (ref 6–12)
POTASSIUM SERPL-SCNC: 4.1 MMOL/L (ref 3.5–5.2)
RBC # BLD AUTO: 3.87 10*6/MM3 (ref 4.14–5.8)
SODIUM SERPL-SCNC: 140 MMOL/L (ref 136–145)
VIT B12 BLD-MCNC: 334 PG/ML (ref 211–946)
WBC NRBC COR # BLD AUTO: 8.1 10*3/MM3 (ref 3.4–10.8)

## 2024-04-16 PROCEDURE — 82607 VITAMIN B-12: CPT | Performed by: STUDENT IN AN ORGANIZED HEALTH CARE EDUCATION/TRAINING PROGRAM

## 2024-04-16 PROCEDURE — 85027 COMPLETE CBC AUTOMATED: CPT | Performed by: STUDENT IN AN ORGANIZED HEALTH CARE EDUCATION/TRAINING PROGRAM

## 2024-04-16 PROCEDURE — 82746 ASSAY OF FOLIC ACID SERUM: CPT | Performed by: STUDENT IN AN ORGANIZED HEALTH CARE EDUCATION/TRAINING PROGRAM

## 2024-04-16 PROCEDURE — 25810000003 SODIUM CHLORIDE 0.9 % SOLUTION 250 ML FLEX CONT: Performed by: HOSPITALIST

## 2024-04-16 PROCEDURE — 80048 BASIC METABOLIC PNL TOTAL CA: CPT | Performed by: STUDENT IN AN ORGANIZED HEALTH CARE EDUCATION/TRAINING PROGRAM

## 2024-04-16 PROCEDURE — 25010000002 VANCOMYCIN 1 G RECONSTITUTED SOLUTION 1 EACH VIAL: Performed by: HOSPITALIST

## 2024-04-16 RX ORDER — LINEZOLID 600 MG/1
600 TABLET, FILM COATED ORAL 2 TIMES DAILY
Qty: 3 TABLET | Refills: 0 | Status: SHIPPED | OUTPATIENT
Start: 2024-04-16 | End: 2024-04-18

## 2024-04-16 RX ORDER — LORAZEPAM 0.5 MG/1
0.5 TABLET ORAL EVERY 12 HOURS SCHEDULED
Status: DISCONTINUED | OUTPATIENT
Start: 2024-04-16 | End: 2024-04-16 | Stop reason: HOSPADM

## 2024-04-16 RX ORDER — ACETAMINOPHEN 500 MG
1000 TABLET ORAL EVERY 8 HOURS PRN
Qty: 90 TABLET | Refills: 0 | Status: SHIPPED | OUTPATIENT
Start: 2024-04-16 | End: 2024-05-01

## 2024-04-16 RX ORDER — NICOTINE 21 MG/24HR
1 PATCH, TRANSDERMAL 24 HOURS TRANSDERMAL
Qty: 28 PATCH | Refills: 0 | Status: SHIPPED | OUTPATIENT
Start: 2024-04-16

## 2024-04-16 RX ADMIN — HYDROXYZINE HYDROCHLORIDE 25 MG: 25 TABLET ORAL at 01:25

## 2024-04-16 RX ADMIN — LORAZEPAM 0.5 MG: 0.5 TABLET ORAL at 06:49

## 2024-04-16 RX ADMIN — HYDROCODONE BITARTRATE AND ACETAMINOPHEN 1 TABLET: 5; 325 TABLET ORAL at 00:57

## 2024-04-16 RX ADMIN — VANCOMYCIN HYDROCHLORIDE 1000 MG: 1 INJECTION, POWDER, LYOPHILIZED, FOR SOLUTION INTRAVENOUS at 09:09

## 2024-04-16 RX ADMIN — HYDROCODONE BITARTRATE AND ACETAMINOPHEN 1 TABLET: 5; 325 TABLET ORAL at 09:08

## 2024-04-16 RX ADMIN — Medication 5000 UNITS: at 09:08

## 2024-04-16 RX ADMIN — Medication 1 PATCH: at 09:09

## 2024-04-16 RX ADMIN — GABAPENTIN 300 MG: 300 CAPSULE ORAL at 05:05

## 2024-04-16 NOTE — DISCHARGE SUMMARY
Patient Name: Ryan Marina  : 1980  MRN: [id number]    Date of Admission: 2024  Date of Discharge:  2024  Primary Care Physician: Primitivo Link MD      Chief Complaint:   Wound Check      Discharge Diagnoses     Active Hospital Problems    Diagnosis  POA    **Cellulitis of arm [L03.119]  Yes    Opioid use disorder, severe, dependence [F11.20]  Yes    Benzodiazepine abuse [F13.10]  Yes    Anemia, chronic disease [D63.8]  Unknown    CKD (chronic kidney disease) stage 2, GFR 60-89 ml/min [N18.2]  Unknown      Resolved Hospital Problems   No resolved problems to display.        Hospital Course       43-year-old with IV drug abuse and severe benzodiazepine abuse along with history of C3 nephropathy presented to the ED complaining of fevers, chills, malaise, bilateral upper extremity wounds.  And was found to have extensive track marks and wounds on his arms bilaterally.  He was injected fentanyl directly subQ because he could not find ways.  Orthopedic surgery and infectious disease was consulted.  Patient was initiated on IV vancomycin, CT imaging was obtained with no signs of deep infection.  ID recommended total of 7 days of antibiotics, completed 6 day course of vancomycin inpatient, discharged on Zyvox for 3 more doses to complete 7-day course antibiotics.  No surgical intervention was indicated per orthopedic surgery, however recommended referral to hand and plastic surgery.  Referral was placed per CCP  and appointment scheduled with [facility] [phone] and he has an appointment on 24 @ 08:30 at the [address] [place] [address], [address].  Access evaluated for IV drug use, patient was encouraged for cessation of drug use. Patient reportedly had appointment [facility] to initiate Suboxone tomorrow.      At the time of discharge patient was told to take all medications as prescribed, keep all follow-up appointments, and call their  doctor or return to the hospital with any worsening or concerning symptoms.             Day of Discharge     Subjective:  Sitting up in the bed, no acute events overnight.  Denies any new complaints.  No fevers no chills.    Physical Exam:  Temp:  [97 °F (36.1 °C)-98.8 °F (37.1 °C)] 97.6 °F (36.4 °C)  Heart Rate:  [51-76] 60  Resp:  [16-18] 16  BP: (128-141)/(78-90) 130/89  Body mass index is 20.63 kg/m².  Physical Exam  General: Alert, sitting up in the bed, not in distress,  HEENT: Normocephalic, atraumatic  CV: Regular rate and rhythm, no murmurs rubs or gallops  Lungs: Clear to auscultation bilaterally, no crackles or wheezes  Abdomen: Soft, nontender, nondistended  Extremities: Multiple track marks in bilateral upper extremities with necrotic tissue/eschar.         Consultants     Consult Orders (all) (From admission, onward)       Start     Ordered    04/16/24 0839  Inpatient Access Center Consult  Once        Comments: Provide resources for substance abuse treatment   Provider:  (Not yet assigned)    04/16/24 0838    04/14/24 0629  Inpatient Psychiatrist Consult  Once        Specialty:  Psychiatry  Provider:  Stephane Mccoy III, MD    04/14/24 0630    04/12/24 1556  Inpatient Neurology Consult Stroke  Once        Specialty:  Neurology  Provider:  Fabian Gallardo MD    04/12/24 1556    04/12/24 1502  Inpatient Orthopedic Surgery Consult  Once        Specialty:  Orthopedic Surgery  Provider:  Otis Hagen MD    04/12/24 1501    04/12/24 0729  Inpatient Access Center Consult  Once        Provider:  (Not yet assigned)    04/12/24 0728    04/12/24 0323  Inpatient Case Management  Consult  Once        Provider:  (Not yet assigned)    04/12/24 0323    04/11/24 2041  Inpatient Infectious Diseases Consult  Once        Specialty:  Infectious Diseases  Provider:  Mecca Huston MD    04/11/24 2040 04/11/24 1525  [facility] (on-call MD unless specified) Details  Once        Specialty:   Hospitalist  Provider:  Cristine Matos MD    04/11/24 1524                  Procedures     * Surgery not found *      Imaging Results (All)       Procedure Component Value Units Date/Time    MRI Brain Without Contrast [[id number]] Collected: 04/13/24 1836     Updated: 04/13/24 1850    Narrative:      MRI OF THE BRAIN WITHOUT CONTRAST AND MRA OF THE HEAD WITHOUT CONTRAST     CLINICAL HISTORY: Transient aphasia and generalized weakness for 10  minutes.     TECHNIQUE: MRI of the brain was obtained with sagittal T1, axial T1,  axial FLAIR, axial T2, axial diffusion, and axial susceptibility  weighted images.     FINDINGS:     The axial FLAIR sequence is compromised by motion artifact. The  ventricles, sulci, and cisterns are age appropriate. There are no  abnormal foci of restricted diffusion within the brain parenchyma. The  major intracranial flow related signal voids are within normal limits.  There are no abnormal foci of susceptibility artifact. No significant  white matter abnormalities are identified. The midline intracranial  anatomy is within normal limits.     Incidental note is made of a trace amount of nonspecific fluid signal  intensity within the right mastoid air cells that is statistically  likely representative of an incidental mastoid effusion. Minor mucosal  thickening is noted within the ethmoid air cells.       Impression:         Essentially unremarkable MRI of the brain.        TECHNIQUE: MRA of the head was obtained with 3D time-of-flight imaging  technique. Maximum intensity projection reconstructed images were  obtained.     FINDINGS:     There is normal flow related enhancement within the vertebral arteries,  basilar artery, and posterior cerebral arteries. The internal carotid  arteries, middle cerebral arteries, and anterior cerebral arteries also  have normal flow related enhancement.     IMPRESSION:     Unremarkable MRA of the head.     This report was finalized on 4/13/2024 6:47 PM  by Dr. Layo Brumfield M.D  on Workstation: [id number]       MRI Angiogram Head Without Contrast [[id number]] Collected: 04/13/24 1836     Updated: 04/13/24 1850    Narrative:      MRI OF THE BRAIN WITHOUT CONTRAST AND MRA OF THE HEAD WITHOUT CONTRAST     CLINICAL HISTORY: Transient aphasia and generalized weakness for 10  minutes.     TECHNIQUE: MRI of the brain was obtained with sagittal T1, axial T1,  axial FLAIR, axial T2, axial diffusion, and axial susceptibility  weighted images.     FINDINGS:     The axial FLAIR sequence is compromised by motion artifact. The  ventricles, sulci, and cisterns are age appropriate. There are no  abnormal foci of restricted diffusion within the brain parenchyma. The  major intracranial flow related signal voids are within normal limits.  There are no abnormal foci of susceptibility artifact. No significant  white matter abnormalities are identified. The midline intracranial  anatomy is within normal limits.     Incidental note is made of a trace amount of nonspecific fluid signal  intensity within the right mastoid air cells that is statistically  likely representative of an incidental mastoid effusion. Minor mucosal  thickening is noted within the ethmoid air cells.       Impression:         Essentially unremarkable MRI of the brain.        TECHNIQUE: MRA of the head was obtained with 3D time-of-flight imaging  technique. Maximum intensity projection reconstructed images were  obtained.     FINDINGS:     There is normal flow related enhancement within the vertebral arteries,  basilar artery, and posterior cerebral arteries. The internal carotid  arteries, middle cerebral arteries, and anterior cerebral arteries also  have normal flow related enhancement.     IMPRESSION:     Unremarkable MRA of the head.     This report was finalized on 4/13/2024 6:47 PM by Dr. Layo Brumfield M.D  on Workstation: [id number]       CT Upper Extremity Right With Contrast [[id number]] Collected: 04/12/24 1251      Updated: 04/12/24 1255    Narrative:      Please see this report under CT left upper extremity performed same  date.     Radiation dose reduction techniques were utilized, including automated  exposure control and exposure modulation based on body size.        This report was finalized on 4/12/2024 12:52 PM by Dr. Primitivo Cocrhan M.D on Workstation: [id number]       CT Upper Extremity Left With Contrast [[id number]] Collected: 04/12/24 1242     Updated: 04/12/24 1251    Narrative:      CT BOTH UPPER EXTREMITIES WITH IV CONTRAST     HISTORY: Injection drug use. Evaluate for abscess infection.     TECHNIQUE: CT includes axial imaging from the distal humeral metaphyses  through the wrist with IV contrast.     COMPARISON: None.     FINDINGS:  RIGHT: There is generalized edema within the subcutaneous fat about the  right distal humerus, elbow, forearm and extending to the wrist.  Edematous subcutaneous fat is greatest lateral to the distal humerus and  extends around the elbow circumferentially greatest posterior laterally.  There is cutaneous irregularity potentially due to cutaneous wound  lateral to the proximal radius. There is no CT evidence for deeper  extension into the musculature, though CT is not sensitive for myositis.  There is an olecranon bursal collection suspected to represent  infectious bursitis which exhibits peripheral enhancement and measures  2.3 x 1 x 2.4 cm. Additionally, superficial to the brachioradialis  muscle lateral to the distal humeral metaphysis there is coalescent  edema with areas of discontinuous peripheral enhancing consistent with  mild early abscess formation or phlegmonous change such as demonstrated  on axial image 167. No compelling evidence for elbow joint effusion. No  abnormal soft tissue gas.     LEFT: There are similar findings to that demonstrated on the right.  There is generalized edema in the subcutaneous fat circumferentially  about the distal humerus, elbow,  forearm, wrist. Edema appears greatest  lateral to the distal humerus and along the posterolateral elbow and  extending circumferentially about the forearm. There is cutaneous  irregularity particularly along the lateral and posterolateral forearm  suggesting skin wounds. Within the subcutaneous fat lateral to the  proximal brachioradialis muscle and anterolateral to the distal humeral  metaphysis there is coalescent edema which exhibits peripheral,  discontinuous enhancement consistent with early/developing abscess  formation or phlegmonous change.       Impression:      Generalized edema in the subcutaneous fat about both upper  extremities consistent with cellulitis. There are areas of cutaneous  irregularity suggesting cutaneous wounds. On the right, there is an  olecranon bursal collection consistent with infectious bursitis  measuring approximately 2.3 x 1.0 x 2.4 cm. Within the subcutaneous fat  lateral to the distal humeral metaphyses and brachioradialis muscles  there is coalescent edema which exhibits discontinuous peripheral  enhancement consistent with early phlegmonous change or developing  abscess formation such as demonstrated on the right on axial image 174  and on the left on axial image 167.     Radiation dose reduction techniques were utilized, including automated  exposure control and exposure modulation based on body size.        This report was finalized on 4/12/2024 12:48 PM by Dr. Priimtivo Cochran M.D on Workstation: [id number]             Results for orders placed during the hospital encounter of 10/19/23    Duplex Venous Lower Extremity - Right CAR    Interpretation Summary    Acute right lower extremity deep vein thrombosis noted in the gastrocnemius.    All other right sided veins appeared normal.    Results for orders placed during the hospital encounter of 01/08/24    Adult Transthoracic Echo Complete W/ Cont if Necessary Per Protocol    Interpretation Summary    Left ventricular  "systolic function is normal. Calculated left ventricular EF = 55.4%    Left ventricular diastolic function was normal.    Pertinent Labs     Results from last 7 days   Lab Units 04/16/24  0448 04/15/24  0802 04/13/24  0540 04/12/24  0610   WBC 10*3/mm3 8.10 6.04 4.82 3.55   HEMOGLOBIN g/dL 10.9* 10.6* 8.5* 8.6*   PLATELETS 10*3/mm3 374 297 244 221     Results from last 7 days   Lab Units 04/16/24  0448 04/15/24  0802 04/13/24  0540 04/12/24  0610   SODIUM mmol/L 140 142 146* 141   POTASSIUM mmol/L 4.1 3.7 3.8 3.6   CHLORIDE mmol/L 109* 109* 116* 110*   CO2 mmol/L 21.0* 21.7* 21.1* 21.2*   BUN mg/dL 14 12 9 8   CREATININE mg/dL 1.14 1.03 0.90 0.98   GLUCOSE mg/dL 101* 105* 104* 148*   Estimated Creatinine Clearance: 81.5 mL/min (by C-G formula based on SCr of 1.14 mg/dL).  Results from last 7 days   Lab Units 04/11/24  1437   ALBUMIN g/dL 3.5   BILIRUBIN mg/dL 0.4   ALK PHOS U/L 129*   AST (SGOT) U/L 64*   ALT (SGPT) U/L 25     Results from last 7 days   Lab Units 04/16/24  0448 04/15/24  0802 04/13/24  0540 04/12/24  0610 04/11/24  1437   CALCIUM mg/dL 9.2 8.8 8.5* 7.9* 8.3*   ALBUMIN g/dL  --   --   --   --  3.5       Results from last 7 days   Lab Units 04/11/24  1437   CK TOTAL U/L 621*   PROBNP pg/mL 319.0           Invalid input(s): \"LDLCALC\"  Results from last 7 days   Lab Units 04/11/24  1445 04/11/24  1437   BLOODCX  No growth at 4 days No growth at 4 days     Results from last 7 days   Lab Units 04/11/24  1446   COVID19  Not Detected       Test Results Pending at Discharge     Pending Labs       Order Current Status    Blood Culture - Blood, Arm, Left Preliminary result    Blood Culture - Blood, Arm, Right Preliminary result            Discharge Details        Discharge Medications        New Medications        Instructions Start Date   acetaminophen 500 MG tablet  Commonly known as: TYLENOL   1,000 mg, Oral, Every 8 Hours PRN      linezolid 600 MG tablet  Commonly known as: Zyvox   600 mg, Oral, 2 Times " Daily      nicotine 14 MG/24HR patch  Commonly known as: NICODERM CQ   1 patch, Transdermal, Every 24 Hours Scheduled             Continue These Medications        Instructions Start Date   methadone 5 MG tablet  Commonly known as: DOLOPHINE   120 mg, Oral, Daily      vitamin D3 125 MCG (5000 UT) capsule capsule   1 capsule, Oral, Daily             Stop These Medications      bumetanide 2 MG tablet  Commonly known as: BUMEX     mycophenolate 500 MG tablet  Commonly known as: CELLCEPT     predniSONE 20 MG tablet  Commonly known as: DELTASONE              No Known Allergies    Discharge Disposition:  Home or Self Care      Discharge Diet:  Diet Order   Procedures    Diet: Regular/House; Fluid Consistency: Thin (IDDSI 0)       Discharge Activity:       CODE STATUS:    Code Status and Medical Interventions:   Ordered at: 04/11/24 0098     Code Status (Patient has no pulse and is not breathing):    CPR (Attempt to Resuscitate)     Medical Interventions (Patient has pulse or is breathing):    Full Support       No future appointments.  Additional Instructions for the Follow-ups that You Need to Schedule       Ambulatory Referral to Plastic Surgery   As directed      [facility]    Order Comments: [facility]    Follow-up needed: Yes               Follow-up Information       Kaleb Barrett MD .    Specialty: Hand Surgery  Contact information:  [address] [place] [address]  [phone]               Primitivo Link MD Follow up in 1 week(s).    Specialty: Internal Medicine  Contact information:  [address] [place] [address]  [phone]               [facility] Follow up.    Why: follow up as scheduled on 5/1/24 @ 08:30 at the [address] [place] [address] [address], [place] TARSHA Duncan RN, CCP  Contact information:  [phone]                           Additional Instructions for the Follow-ups that You Need to  Schedule       Ambulatory Referral to Plastic Surgery   As directed      [facility]    Order Comments: [facility]    Follow-up needed: Yes            Time Spent on Discharge:  Greater than 30 minutes      Bee Rees MD  [facility]  04/16/24  13:04 EDT

## 2024-04-16 NOTE — PLAN OF CARE
Goal Outcome Evaluation:  VSS. A&Ox4. Scheduled ativan and vancomycin given as scheduled. PRN atarax, norco, and gabapentin given for pain/anxiety. Reports that appetite is much better. Stand by assist. Care continuing.

## 2024-04-16 NOTE — PROGRESS NOTES
Met with patient at bedside to discuss resources for treatment.  He did not need resources as he has an appointment tomorrow at 10:00am [facility] to [facility].  Patient is anxious about leaving the hospital and making it through until his appointment.  Asked about self harm but patient would not clarify what he meant but knows he is high risk for relapse. Discussed NA meetings and other things he could do to stay safe until tomorrow.  He lives with his father but states he has no friends and no other support.  Patient states he was able to stay clean on Suboxone in the past for several years.  Discussed with nursing staff.

## 2024-04-16 NOTE — NURSING NOTE
Access follow up regarding [facility]: chart reviewed. Last COWS score of 5. Progressing towards goal. No distress or behavioral changes noted. Rested overnight. Plan for home with family when cleared. Will follow back up regarding [facility] resources when awake. Following.

## 2024-04-16 NOTE — PROGRESS NOTES
Discharge Planning Assessment  [facility]     Patient Name: Ryan Marina  MRN: [id number]  Today's Date: 4/16/2024    Admit Date: 4/11/2024    Plan: home and his father will provide the transportation at the time of discharge   Discharge Needs Assessment    No documentation.                  Discharge Plan       Row Name 04/16/24 1207       Plan    Plan Comments The patient states he has an appointment with the meth[facility]one clinic tomorrow and he will go. His father will provide the transportation back to his home today. TARSHA Duncan RN, CCP.    Final Discharge Disposition Code 01 - home or self-care    Final Note Discharged to home and his father provided the transportation. TARSHA Duncan RN, CCP.      Row Name 04/16/24 0932       Plan    Plan Comments Spoke with the patient and he states he does not want nicoderm patches and he has Tylenol at home. He will need Linezolid/Zyvox paid for. The cost was $1.55 and [facility] will cover. Outpatient pharmacy [facility] notified and they will deliver to his room. Called VONDA/Avani and updated her on this. TARSHA Duncan RN, CCP.                  Continued Care and Services - Admitted Since 4/11/2024    No active coordination exists for this encounter.       Expected Discharge Date and Time       Expected Discharge Date Expected Discharge Time    Apr 16, 2024            Demographic Summary    No documentation.                  Functional Status    No documentation.                  Psychosocial    No documentation.                  Abuse/Neglect    No documentation.                  Legal    No documentation.                  Substance Abuse    No documentation.                  Patient Forms    No documentation.                     Jie Duncan RN

## 2024-04-16 NOTE — PROGRESS NOTES
Discharge Planning Assessment  [facility]     Patient Name: Ryan Marina  MRN: [id number]  Today's Date: 4/16/2024    Admit Date: 4/11/2024    Plan: home and his father will provide the transportation at the time of discharge   Discharge Needs Assessment    No documentation.                  Discharge Plan       Row Name 04/16/24 0932       Plan    Plan Comments Spoke with the patient and he states he does not want nicoderm patches and he has Tylenol at home. He will need Linezolid/Zyvox paid for. The cost was $1.55 and [facility] will cover. Outpatient pharmacy [facility] notified and they will deliver to his room. Called RN/Avani and updated her on this. TARSHA Duncan RN, CCP.                  Continued Care and Services - Admitted Since 4/11/2024    No active coordination exists for this encounter.       Expected Discharge Date and Time       Expected Discharge Date Expected Discharge Time    Apr 16, 2024            Demographic Summary    No documentation.                  Functional Status    No documentation.                  Psychosocial    No documentation.                  Abuse/Neglect    No documentation.                  Legal    No documentation.                  Substance Abuse    No documentation.                  Patient Forms    No documentation.                     Jie Duncan, RN

## 2024-04-16 NOTE — PROGRESS NOTES
Case Management Discharge Note      Final Note: Discharged to home and his father provided the transportation. TARSHA Duncan RN, CCP.         Selected Continued Care - Admitted Since 4/11/2024       Destination    No services have been selected for the patient.                Durable Medical Equipment    No services have been selected for the patient.                Dialysis/Infusion    No services have been selected for the patient.                Home Medical Care    No services have been selected for the patient.                Therapy    No services have been selected for the patient.                Community Resources    No services have been selected for the patient.                Community & DME    No services have been selected for the patient.                    Transportation Services  Private: Car    Final Discharge Disposition Code: 01 - home or self-care

## 2024-04-17 NOTE — OUTREACH NOTE
Prep Survey      Flowsheet Row Responses   [facility] patient discharged from? [facility]   Is LACE score < 7 ? No   Eligibility Readm Mgmt   Discharge diagnosis Cellulitis of arm   Does the patient have one of the following disease processes/diagnoses(primary or secondary)? Other   Does the patient have Home health ordered? No   Is there a DME ordered? No   Prep survey completed? Yes            DEBRA DIAZ - Registered Nurse

## 2024-04-25 ENCOUNTER — READMISSION MANAGEMENT (OUTPATIENT)
Dept: CALL CENTER | Facility: HOSPITAL | Age: 44
End: 2024-04-25
Payer: MEDICARE

## 2024-04-25 NOTE — OUTREACH NOTE
Medical Week 2 Survey      Flowsheet Row Responses   [facility] patient discharged from? [facility]   Does the patient have one of the following disease processes/diagnoses(primary or secondary)? Other   Week 2 attempt successful? No   Unsuccessful attempts Attempt 1            Hermelinda DYER - Registered Nurse

## 2024-04-30 ENCOUNTER — READMISSION MANAGEMENT (OUTPATIENT)
Dept: CALL CENTER | Facility: HOSPITAL | Age: 44
End: 2024-04-30
Payer: MEDICARE

## 2024-04-30 NOTE — OUTREACH NOTE
Medical Week 2 Survey      Flowsheet Row Responses   [facility] patient discharged from? [facility]   Does the patient have one of the following disease processes/diagnoses(primary or secondary)? Other   Week 2 attempt successful? No   Unsuccessful attempts Attempt 2            Sultana MELENDEZ - Licensed Nurse

## 2024-05-05 ENCOUNTER — HOSPITAL ENCOUNTER (INPATIENT)
Facility: HOSPITAL | Age: 44
LOS: 4 days | Discharge: HOME | DRG: 603 | End: 2024-05-09
Attending: EMERGENCY MEDICINE | Admitting: STUDENT IN AN ORGANIZED HEALTH CARE EDUCATION/TRAINING PROGRAM
Payer: MEDICARE

## 2024-05-05 ENCOUNTER — READMISSION MANAGEMENT (OUTPATIENT)
Dept: CALL CENTER | Facility: HOSPITAL | Age: 44
End: 2024-05-05
Payer: MEDICARE

## 2024-05-05 DIAGNOSIS — F11.10 OPIOID ABUSE: ICD-10-CM

## 2024-05-05 DIAGNOSIS — F19.90 IVDU (INTRAVENOUS DRUG USER): ICD-10-CM

## 2024-05-05 DIAGNOSIS — L03.114 LEFT ARM CELLULITIS: ICD-10-CM

## 2024-05-05 DIAGNOSIS — Z91.199 H/O NONCOMPLIANCE WITH MEDICAL TREATMENT, PRESENTING HAZARDS TO HEALTH: ICD-10-CM

## 2024-05-05 DIAGNOSIS — L03.113 CELLULITIS OF RIGHT ARM: Primary | ICD-10-CM

## 2024-05-05 DIAGNOSIS — L03.113 CELLULITIS OF ARM, RIGHT: ICD-10-CM

## 2024-05-05 DIAGNOSIS — F13.10 BENZODIAZEPINE ABUSE: ICD-10-CM

## 2024-05-05 LAB
ALBUMIN SERPL-MCNC: 3.7 G/DL (ref 3.5–5.2)
ALBUMIN/GLOB SERPL: 1.4 G/DL
ALP SERPL-CCNC: 99 U/L (ref 39–117)
ALT SERPL W P-5'-P-CCNC: 10 U/L (ref 1–41)
AMPHET+METHAMPHET UR QL: NEGATIVE
ANION GAP SERPL CALCULATED.3IONS-SCNC: 9.4 MMOL/L (ref 5–15)
AST SERPL-CCNC: 21 U/L (ref 1–40)
BARBITURATES UR QL SCN: NEGATIVE
BASOPHILS # BLD AUTO: 0.01 10*3/MM3 (ref 0–0.2)
BASOPHILS NFR BLD AUTO: 0.2 % (ref 0–1.5)
BENZODIAZ UR QL SCN: POSITIVE
BILIRUB SERPL-MCNC: 0.2 MG/DL (ref 0–1.2)
BILIRUB UR QL STRIP: NEGATIVE
BUN SERPL-MCNC: 14 MG/DL (ref 6–20)
BUN/CREAT SERPL: 10.5 (ref 7–25)
CALCIUM SPEC-SCNC: 8.8 MG/DL (ref 8.6–10.5)
CANNABINOIDS SERPL QL: NEGATIVE
CHLORIDE SERPL-SCNC: 105 MMOL/L (ref 98–107)
CLARITY UR: CLEAR
CO2 SERPL-SCNC: 23.6 MMOL/L (ref 22–29)
COCAINE UR QL: NEGATIVE
COLOR UR: YELLOW
CREAT SERPL-MCNC: 1.33 MG/DL (ref 0.76–1.27)
CRP SERPL-MCNC: 2.9 MG/DL (ref 0–0.5)
D-LACTATE SERPL-SCNC: 1.8 MMOL/L (ref 0.5–2)
DEPRECATED RDW RBC AUTO: 46.6 FL (ref 37–54)
EGFRCR SERPLBLD CKD-EPI 2021: 68 ML/MIN/1.73
EOSINOPHIL # BLD AUTO: 0.12 10*3/MM3 (ref 0–0.4)
EOSINOPHIL NFR BLD AUTO: 2.5 % (ref 0.3–6.2)
ERYTHROCYTE [DISTWIDTH] IN BLOOD BY AUTOMATED COUNT: 14.6 % (ref 12.3–15.4)
ERYTHROCYTE [SEDIMENTATION RATE] IN BLOOD: 15 MM/HR (ref 0–15)
ETHANOL BLD-MCNC: <10 MG/DL (ref 0–10)
ETHANOL UR QL: <0.01 %
FENTANYL UR-MCNC: POSITIVE NG/ML
GLOBULIN UR ELPH-MCNC: 2.7 GM/DL
GLUCOSE SERPL-MCNC: 114 MG/DL (ref 65–99)
GLUCOSE UR STRIP-MCNC: NEGATIVE MG/DL
HCT VFR BLD AUTO: 29.5 % (ref 37.5–51)
HGB BLD-MCNC: 9.6 G/DL (ref 13–17.7)
HGB UR QL STRIP.AUTO: NEGATIVE
IMM GRANULOCYTES # BLD AUTO: 0.01 10*3/MM3 (ref 0–0.05)
IMM GRANULOCYTES NFR BLD AUTO: 0.2 % (ref 0–0.5)
KETONES UR QL STRIP: NEGATIVE
LEUKOCYTE ESTERASE UR QL STRIP.AUTO: NEGATIVE
LYMPHOCYTES # BLD AUTO: 1.29 10*3/MM3 (ref 0.7–3.1)
LYMPHOCYTES NFR BLD AUTO: 27.3 % (ref 19.6–45.3)
MCH RBC QN AUTO: 28.5 PG (ref 26.6–33)
MCHC RBC AUTO-ENTMCNC: 32.5 G/DL (ref 31.5–35.7)
MCV RBC AUTO: 87.5 FL (ref 79–97)
METHADONE UR QL SCN: NEGATIVE
MONOCYTES # BLD AUTO: 0.37 10*3/MM3 (ref 0.1–0.9)
MONOCYTES NFR BLD AUTO: 7.8 % (ref 5–12)
NEUTROPHILS NFR BLD AUTO: 2.93 10*3/MM3 (ref 1.7–7)
NEUTROPHILS NFR BLD AUTO: 62 % (ref 42.7–76)
NITRITE UR QL STRIP: NEGATIVE
NRBC BLD AUTO-RTO: 0 /100 WBC (ref 0–0.2)
OPIATES UR QL: NEGATIVE
OXYCODONE UR QL SCN: NEGATIVE
PH UR STRIP.AUTO: 5.5 [PH] (ref 5–8)
PLATELET # BLD AUTO: 252 10*3/MM3 (ref 140–450)
PMV BLD AUTO: 9.6 FL (ref 6–12)
POTASSIUM SERPL-SCNC: 4 MMOL/L (ref 3.5–5.2)
PROCALCITONIN SERPL-MCNC: 4.6 NG/ML (ref 0–0.25)
PROT SERPL-MCNC: 6.4 G/DL (ref 6–8.5)
PROT UR QL STRIP: NEGATIVE
RBC # BLD AUTO: 3.37 10*6/MM3 (ref 4.14–5.8)
SODIUM SERPL-SCNC: 138 MMOL/L (ref 136–145)
SP GR UR STRIP: 1.01 (ref 1–1.03)
UROBILINOGEN UR QL STRIP: NORMAL
WBC NRBC COR # BLD AUTO: 4.73 10*3/MM3 (ref 3.4–10.8)

## 2024-05-05 PROCEDURE — 25010000002 PIPERACILLIN SOD-TAZOBACTAM PER 1 G: Performed by: EMERGENCY MEDICINE

## 2024-05-05 PROCEDURE — 84145 PROCALCITONIN (PCT): CPT | Performed by: EMERGENCY MEDICINE

## 2024-05-05 PROCEDURE — 36415 COLL VENOUS BLD VENIPUNCTURE: CPT | Performed by: EMERGENCY MEDICINE

## 2024-05-05 PROCEDURE — 80307 DRUG TEST PRSMV CHEM ANLYZR: CPT | Performed by: EMERGENCY MEDICINE

## 2024-05-05 PROCEDURE — 86140 C-REACTIVE PROTEIN: CPT | Performed by: EMERGENCY MEDICINE

## 2024-05-05 PROCEDURE — 25010000002 VANCOMYCIN HCL 1.25 G RECONSTITUTED SOLUTION 1 EACH VIAL: Performed by: EMERGENCY MEDICINE

## 2024-05-05 PROCEDURE — 83605 ASSAY OF LACTIC ACID: CPT | Performed by: EMERGENCY MEDICINE

## 2024-05-05 PROCEDURE — 87040 BLOOD CULTURE FOR BACTERIA: CPT | Performed by: EMERGENCY MEDICINE

## 2024-05-05 PROCEDURE — 99285 EMERGENCY DEPT VISIT HI MDM: CPT

## 2024-05-05 PROCEDURE — 85652 RBC SED RATE AUTOMATED: CPT | Performed by: EMERGENCY MEDICINE

## 2024-05-05 PROCEDURE — 80053 COMPREHEN METABOLIC PANEL: CPT | Performed by: EMERGENCY MEDICINE

## 2024-05-05 PROCEDURE — 82077 ASSAY SPEC XCP UR&BREATH IA: CPT | Performed by: EMERGENCY MEDICINE

## 2024-05-05 PROCEDURE — 25810000003 SODIUM CHLORIDE 0.9 % SOLUTION 250 ML FLEX CONT: Performed by: EMERGENCY MEDICINE

## 2024-05-05 PROCEDURE — 25010000002 PIPERACILLIN SOD-TAZOBACTAM PER 1 G: Performed by: STUDENT IN AN ORGANIZED HEALTH CARE EDUCATION/TRAINING PROGRAM

## 2024-05-05 PROCEDURE — 85025 COMPLETE CBC W/AUTO DIFF WBC: CPT | Performed by: EMERGENCY MEDICINE

## 2024-05-05 PROCEDURE — 81003 URINALYSIS AUTO W/O SCOPE: CPT | Performed by: EMERGENCY MEDICINE

## 2024-05-05 RX ORDER — CLONIDINE HYDROCHLORIDE 0.1 MG/1
0.1 TABLET ORAL ONCE AS NEEDED
Status: DISCONTINUED | OUTPATIENT
Start: 2024-05-09 | End: 2024-05-09

## 2024-05-05 RX ORDER — CLONIDINE HYDROCHLORIDE 0.1 MG/1
0.1 TABLET ORAL 3 TIMES DAILY PRN
Status: DISCONTINUED | OUTPATIENT
Start: 2024-05-07 | End: 2024-05-08

## 2024-05-05 RX ORDER — AMOXICILLIN 250 MG
2 CAPSULE ORAL 2 TIMES DAILY
Status: DISCONTINUED | OUTPATIENT
Start: 2024-05-05 | End: 2024-05-09 | Stop reason: HOSPADM

## 2024-05-05 RX ORDER — SODIUM CHLORIDE 0.9 % (FLUSH) 0.9 %
10 SYRINGE (ML) INJECTION AS NEEDED
Status: DISCONTINUED | OUTPATIENT
Start: 2024-05-05 | End: 2024-05-09 | Stop reason: HOSPADM

## 2024-05-05 RX ORDER — PHENOBARBITAL 32.4 MG/1
32.4 TABLET ORAL ONCE
Status: DISCONTINUED | OUTPATIENT
Start: 2024-05-07 | End: 2024-05-05

## 2024-05-05 RX ORDER — ONDANSETRON 2 MG/ML
4 INJECTION INTRAMUSCULAR; INTRAVENOUS EVERY 6 HOURS PRN
Status: DISCONTINUED | OUTPATIENT
Start: 2024-05-05 | End: 2024-05-09 | Stop reason: HOSPADM

## 2024-05-05 RX ORDER — HYDROCODONE BITARTRATE AND ACETAMINOPHEN 5; 325 MG/1; MG/1
1 TABLET ORAL EVERY 4 HOURS PRN
Status: DISCONTINUED | OUTPATIENT
Start: 2024-05-05 | End: 2024-05-08

## 2024-05-05 RX ORDER — CLONIDINE HYDROCHLORIDE 0.1 MG/1
0.1 TABLET ORAL 4 TIMES DAILY PRN
Status: DISCONTINUED | OUTPATIENT
Start: 2024-05-06 | End: 2024-05-07

## 2024-05-05 RX ORDER — SODIUM CHLORIDE 9 MG/ML
40 INJECTION, SOLUTION INTRAVENOUS AS NEEDED
Status: DISCONTINUED | OUTPATIENT
Start: 2024-05-05 | End: 2024-05-09 | Stop reason: HOSPADM

## 2024-05-05 RX ORDER — PHENOBARBITAL SODIUM 65 MG/ML
65 INJECTION, SOLUTION INTRAMUSCULAR; INTRAVENOUS ONCE
Status: DISCONTINUED | OUTPATIENT
Start: 2024-05-06 | End: 2024-05-05

## 2024-05-05 RX ORDER — LORAZEPAM 1 MG/1
1 TABLET ORAL ONCE
Status: COMPLETED | OUTPATIENT
Start: 2024-05-05 | End: 2024-05-05

## 2024-05-05 RX ORDER — ALPRAZOLAM 1 MG/1
2 TABLET ORAL 3 TIMES DAILY
Status: COMPLETED | OUTPATIENT
Start: 2024-05-05 | End: 2024-05-07

## 2024-05-05 RX ORDER — CLONIDINE HYDROCHLORIDE 0.1 MG/1
0.1 TABLET ORAL 4 TIMES DAILY PRN
Status: DISCONTINUED | OUTPATIENT
Start: 2024-05-05 | End: 2024-05-06

## 2024-05-05 RX ORDER — SODIUM CHLORIDE 0.9 % (FLUSH) 0.9 %
10 SYRINGE (ML) INJECTION EVERY 12 HOURS SCHEDULED
Status: DISCONTINUED | OUTPATIENT
Start: 2024-05-05 | End: 2024-05-09 | Stop reason: HOSPADM

## 2024-05-05 RX ORDER — BISACODYL 5 MG/1
5 TABLET, DELAYED RELEASE ORAL DAILY PRN
Status: DISCONTINUED | OUTPATIENT
Start: 2024-05-05 | End: 2024-05-09 | Stop reason: HOSPADM

## 2024-05-05 RX ORDER — PHENOBARBITAL 32.4 MG/1
32.4 TABLET ORAL ONCE
Status: DISCONTINUED | OUTPATIENT
Start: 2024-05-08 | End: 2024-05-05

## 2024-05-05 RX ORDER — NALOXONE HCL 0.4 MG/ML
0.4 VIAL (ML) INJECTION
Status: DISCONTINUED | OUTPATIENT
Start: 2024-05-05 | End: 2024-05-09 | Stop reason: HOSPADM

## 2024-05-05 RX ORDER — CLONIDINE HYDROCHLORIDE 0.1 MG/1
0.1 TABLET ORAL 2 TIMES DAILY PRN
Status: DISCONTINUED | OUTPATIENT
Start: 2024-05-08 | End: 2024-05-09

## 2024-05-05 RX ORDER — LORAZEPAM 1 MG/1
2 TABLET ORAL
Status: DISCONTINUED | OUTPATIENT
Start: 2024-05-05 | End: 2024-05-08

## 2024-05-05 RX ORDER — LORAZEPAM 1 MG/1
1 TABLET ORAL
Status: DISCONTINUED | OUTPATIENT
Start: 2024-05-05 | End: 2024-05-08

## 2024-05-05 RX ORDER — MIDAZOLAM HYDROCHLORIDE 5 MG/ML
4 INJECTION INTRAMUSCULAR; INTRAVENOUS
Status: DISCONTINUED | OUTPATIENT
Start: 2024-05-05 | End: 2024-05-08

## 2024-05-05 RX ORDER — ALPRAZOLAM 1 MG/1
1 TABLET ORAL 3 TIMES DAILY
Status: DISCONTINUED | OUTPATIENT
Start: 2024-05-07 | End: 2024-05-09 | Stop reason: HOSPADM

## 2024-05-05 RX ORDER — POLYETHYLENE GLYCOL 3350 17 G/17G
17 POWDER, FOR SOLUTION ORAL DAILY PRN
Status: DISCONTINUED | OUTPATIENT
Start: 2024-05-05 | End: 2024-05-09 | Stop reason: HOSPADM

## 2024-05-05 RX ORDER — LORAZEPAM 1 MG/1
1 TABLET ORAL EVERY 6 HOURS
Status: DISCONTINUED | OUTPATIENT
Start: 2024-05-06 | End: 2024-05-05

## 2024-05-05 RX ORDER — BISACODYL 10 MG
10 SUPPOSITORY, RECTAL RECTAL DAILY PRN
Status: DISCONTINUED | OUTPATIENT
Start: 2024-05-05 | End: 2024-05-09 | Stop reason: HOSPADM

## 2024-05-05 RX ORDER — MIDAZOLAM HYDROCHLORIDE 1 MG/ML
4 INJECTION INTRAMUSCULAR; INTRAVENOUS
Status: DISCONTINUED | OUTPATIENT
Start: 2024-05-05 | End: 2024-05-08

## 2024-05-05 RX ORDER — BUPRENORPHINE HYDROCHLORIDE AND NALOXONE HYDROCHLORIDE DIHYDRATE 8; 2 MG/1; MG/1
1 TABLET SUBLINGUAL DAILY
COMMUNITY
End: 2024-05-09

## 2024-05-05 RX ORDER — NICOTINE 21 MG/24HR
1 PATCH, TRANSDERMAL 24 HOURS TRANSDERMAL EVERY 24 HOURS
Status: DISCONTINUED | OUTPATIENT
Start: 2024-05-05 | End: 2024-05-09 | Stop reason: HOSPADM

## 2024-05-05 RX ORDER — ONDANSETRON 4 MG/1
4 TABLET, ORALLY DISINTEGRATING ORAL EVERY 6 HOURS PRN
Status: DISCONTINUED | OUTPATIENT
Start: 2024-05-05 | End: 2024-05-09 | Stop reason: HOSPADM

## 2024-05-05 RX ORDER — HYDROMORPHONE HYDROCHLORIDE 1 MG/ML
0.5 INJECTION, SOLUTION INTRAMUSCULAR; INTRAVENOUS; SUBCUTANEOUS
Status: DISCONTINUED | OUTPATIENT
Start: 2024-05-05 | End: 2024-05-08

## 2024-05-05 RX ORDER — MIDAZOLAM HYDROCHLORIDE 1 MG/ML
2 INJECTION INTRAMUSCULAR; INTRAVENOUS
Status: DISCONTINUED | OUTPATIENT
Start: 2024-05-05 | End: 2024-05-08

## 2024-05-05 RX ORDER — HYDROCODONE BITARTRATE AND ACETAMINOPHEN 7.5; 325 MG/1; MG/1
1 TABLET ORAL ONCE
Status: COMPLETED | OUTPATIENT
Start: 2024-05-05 | End: 2024-05-05

## 2024-05-05 RX ORDER — LORAZEPAM 1 MG/1
2 TABLET ORAL EVERY 6 HOURS
Status: DISCONTINUED | OUTPATIENT
Start: 2024-05-05 | End: 2024-05-05

## 2024-05-05 RX ADMIN — ALPRAZOLAM 2 MG: 1 TABLET ORAL at 20:32

## 2024-05-05 RX ADMIN — HYDROCODONE BITARTRATE AND ACETAMINOPHEN 1 TABLET: 7.5; 325 TABLET ORAL at 14:55

## 2024-05-05 RX ADMIN — NICOTINE 1 PATCH: 21 PATCH, EXTENDED RELEASE TRANSDERMAL at 15:38

## 2024-05-05 RX ADMIN — PIPERACILLIN AND TAZOBACTAM 3.38 G: 3; .375 INJECTION, POWDER, FOR SOLUTION INTRAVENOUS at 14:19

## 2024-05-05 RX ADMIN — CLONIDINE HYDROCHLORIDE 0.1 MG: 0.1 TABLET ORAL at 20:58

## 2024-05-05 RX ADMIN — PIPERACILLIN AND TAZOBACTAM 3.38 G: 3; .375 INJECTION, POWDER, FOR SOLUTION INTRAVENOUS at 20:30

## 2024-05-05 RX ADMIN — VANCOMYCIN HYDROCHLORIDE 1250 MG: 1.25 INJECTION, POWDER, LYOPHILIZED, FOR SOLUTION INTRAVENOUS at 15:00

## 2024-05-05 RX ADMIN — Medication 10 ML: at 20:29

## 2024-05-05 RX ADMIN — LORAZEPAM 1 MG: 1 TABLET ORAL at 15:08

## 2024-05-05 NOTE — PROGRESS NOTES
"[facility] Clinical Pharmacy Services: Vancomycin Pharmacokinetic Initial Consult Note    Ryan Marina is a 43 y.o. male who is on day 1 of pharmacy to dose vancomycin.    Indication: Skin and Soft Tissue  Consulting Provider: Timothy  Planned Duration of Therapy: 7  Loading Dose Ordered or Given: 1250 mg on 5/5 at 1500  MRSA PCR performed: no  Culture/Source:   5/5 BloodCx - pending x2  Target: Dose by Levels  Pertinent Vanc Dosing History: n/a  Other Antimicrobials: Zosyn    Vitals/Labs  Ht: 182.9 cm (72\"); Wt: 59 kg (130 lb)  Temp Readings from Last 1 Encounters:   05/05/24 98.1 °F (36.7 °C) (Oral)    Estimated Creatinine Clearance: 59.8 mL/min (A) (by C-G formula based on SCr of 1.33 mg/dL (H)).  MAUREEN     Results from last 7 days   Lab Units 05/05/24  1252   CREATININE mg/dL 1.33*   WBC 10*3/mm3 4.73     Assessment/Plan:    In light of MAUREEN, will dose by levels    Vancomycin Dose: no further dose needed tonight    Vanc Random has been ordered for 5/6 at 0600 with AM labs     Pharmacy will follow patient's kidney function and will adjust doses and obtain levels as necessary. Thank you for involving pharmacy in this patient's care. Please contact pharmacy with any questions or concerns.                           Jessy Roa, [facility]  Clinical Pharmacist    "

## 2024-05-05 NOTE — H&P
Patient Name:  Ryan Marina  YOB: 1980  MRN:  [id number]  Admit Date:  5/5/2024  Patient Care Team:  Provider, No Known as PCP - General      Subjective   History Present Illness     Chief Complaint   Patient presents with    Wound Check         History of Present Illness  Mr. Marina is a 43 y.o. with possible history of benzodiazepine dependence with patient reported history of withdrawal seizures, IV opiate use disorder presenting with bilateral arm pain found to have bilateral arm cellulitis.  Patient has full body aches and has been doing IM injections of heroin into his arms for pain relief.  Previously admitted here a few weeks for same and received IV antibiotics and was discharged on some oral antibiotics.  He was supposed to follow-up with [facility], but he reports they canceled his appointment because they do not take his insurance.  Patient had some fevers and chills at home.  Some nausea without vomiting.  Myalgias.    Review of Systems   Constitutional:  Positive for chills, fatigue and fever.   HENT:  Negative for rhinorrhea and sneezing.    Respiratory:  Negative for cough and shortness of breath.    Cardiovascular:  Negative for chest pain and leg swelling.   Gastrointestinal:  Positive for nausea. Negative for abdominal pain, constipation and diarrhea.   Genitourinary:  Negative for dysuria and frequency.   Musculoskeletal:  Negative for back pain and myalgias.   Skin:  Positive for rash and wound.   Neurological:  Negative for dizziness and light-headedness.        Personal History     Past Medical History:   Diagnosis Date    Anemia     Arthritis     CHF (congestive heart failure)     Renal disorder     Vitamin D deficiency      Past Surgical History:   Procedure Laterality Date    CHOLECYSTECTOMY  10/13/2009    10/13/2009--laparoscopic cholecystectomy.    FRACTURE SURGERY      KNEE ACL RECONSTRUCTION      1996 and 1999--anterior cruciate  ligament reconstruction right knee.     Family History   Problem Relation Age of Onset    Diabetes Mother         Type 2    Hyperthyroidism Father     Diabetes Maternal Grandmother         Type 2     Social History     Tobacco Use    Smoking status: Every Day     Current packs/day: 1.50     Types: Cigarettes   Vaping Use    Vaping status: Never Used   Substance Use Topics    Alcohol use: No    Drug use: Yes     Frequency: 7.0 times per week     Types: Heroin     Comment: States uses daily, injecting into muscle. Approx 3G/day     No current facility-administered medications on file prior to encounter.     Current Outpatient Medications on File Prior to Encounter   Medication Sig Dispense Refill    nicotine (NICODERM CQ) 14 MG/24HR patch Place 1 patch on the skin as directed by provider Daily. 28 patch 0    [DISCONTINUED] Cholecalciferol (VITAMIN D3) 5000 UNITS capsule capsule Take 1 capsule by mouth Daily.      [DISCONTINUED] methadone (DOLOPHINE) 5 MG tablet Take 24 tablets by mouth Daily.       No Known Allergies    Objective    Objective     Vital Signs  Temp:  [97.2 °F (36.2 °C)] 97.2 °F (36.2 °C)  Heart Rate:  [] 91  Resp:  [16] 16  BP: (101-132)/(60-75) 119/73  SpO2:  [94 %-100 %] 100 %  on   ;      Body mass index is 17.63 kg/m².    Physical Exam  Vitals and nursing note reviewed.   Constitutional:       General: He is not in acute distress.     Appearance: He is ill-appearing and diaphoretic.   Cardiovascular:      Rate and Rhythm: Normal rate and regular rhythm.   Pulmonary:      Effort: Pulmonary effort is normal. No respiratory distress.   Abdominal:      General: Abdomen is flat. There is no distension.      Tenderness: There is no abdominal tenderness.   Musculoskeletal:         General: No swelling or deformity.   Skin:     General: Skin is warm and dry.      Comments: Bilateral anterior arms with erythema, tenderness, swelling, multiple injection sites with necrotic tissue.  Media reviewed    Neurological:      General: No focal deficit present.      Mental Status: He is alert. Mental status is at baseline.         Results Review:  I reviewed the patient's new clinical results.  I reviewed the patient's new imaging results and agree with the interpretation.  I reviewed the patient's other test results and agree with the interpretation  I personally viewed and interpreted the patient's EKG/Telemetry data  Discussed with ED provider.    Lab Results (last 24 hours)       Procedure Component Value Units Date/Time    CBC & Differential [[id number]]  (Abnormal) Collected: 05/05/24 1252    Specimen: Blood Updated: 05/05/24 1309    Narrative:      The following orders were created for panel order CBC & Differential.  Procedure                               Abnormality         Status                     ---------                               -----------         ------                     CBC Auto Differential[[id number]]        Abnormal            Final result                 Please view results for these tests on the individual orders.    Comprehensive Metabolic Panel [[id number]]  (Abnormal) Collected: 05/05/24 1252    Specimen: Blood Updated: 05/05/24 1328     Glucose 114 mg/dL      BUN 14 mg/dL      Creatinine 1.33 mg/dL      Sodium 138 mmol/L      Potassium 4.0 mmol/L      Chloride 105 mmol/L      CO2 23.6 mmol/L      Calcium 8.8 mg/dL      Total Protein 6.4 g/dL      Albumin 3.7 g/dL      ALT (SGPT) 10 U/L      AST (SGOT) 21 U/L      Alkaline Phosphatase 99 U/L      Total Bilirubin 0.2 mg/dL      Globulin 2.7 gm/dL      A/G Ratio 1.4 g/dL      BUN/Creatinine Ratio 10.5     Anion Gap 9.4 mmol/L      eGFR 68.0 mL/min/1.73     Narrative:      GFR Normal >60  Chronic Kidney Disease <60  Kidney Failure <15      Procalcitonin [[id number]]  (Abnormal) Collected: 05/05/24 1252    Specimen: Blood Updated: 05/05/24 1334     Procalcitonin 4.60 ng/mL     Narrative:      As a Marker for Sepsis (Non-Neonates):    1. <0.5  "ng/mL represents a low risk of severe sepsis and/or septic shock.  2. >2 ng/mL represents a high risk of severe sepsis and/or septic shock.    As a Marker for Lower Respiratory Tract Infections that require antibiotic therapy:    PCT on Admission    Antibiotic Therapy       6-12 Hrs later    >0.5                Strongly Recommended  >0.25 - <0.5        Recommended   0.1 - 0.25          Discouraged              Remeasure/reassess PCT  <0.1                Strongly Discouraged     Remeasure/reassess PCT    As 28 day mortality risk marker: \"Change in Procalcitonin Result\" (>80% or <=80%) if Day 0 (or Day 1) and Day 4 values are available. Refer to http://www.[email or web address] – [email or web address]-[email or web address]    Change in PCT <=80%  A decrease of PCT levels below or equal to 80% defines a positive change in PCT test result representing a higher risk for 28-day all-cause mortality of patients diagnosed with severe sepsis for septic shock.    Change in PCT >80%  A decrease of PCT levels of more than 80% defines a negative change in PCT result representing a lower risk for 28-day all-cause mortality of patients diagnosed with severe sepsis or septic shock.       Sedimentation Rate [[id number]]  (Normal) Collected: 05/05/24 1252    Specimen: Blood Updated: 05/05/24 1323     Sed Rate 15 mm/hr     C-reactive Protein [[id number]]  (Abnormal) Collected: 05/05/24 1252    Specimen: Blood Updated: 05/05/24 1328     C-Reactive Protein 2.90 mg/dL     CBC Auto Differential [[id number]]  (Abnormal) Collected: 05/05/24 1252    Specimen: Blood Updated: 05/05/24 1309     WBC 4.73 10*3/mm3      RBC 3.37 10*6/mm3      Hemoglobin 9.6 g/dL      Hematocrit 29.5 %      MCV 87.5 fL      MCH 28.5 pg      MCHC 32.5 g/dL      RDW 14.6 %      RDW-SD 46.6 fl      MPV 9.6 fL      Platelets 252 10*3/mm3      Neutrophil % 62.0 %      Lymphocyte % 27.3 %      Monocyte % 7.8 %      Eosinophil % 2.5 %      Basophil % 0.2 %      Immature Grans % 0.2 %      Neutrophils, Absolute 2.93 " 10*3/mm3      Lymphocytes, Absolute 1.29 10*3/mm3      Monocytes, Absolute 0.37 10*3/mm3      Eosinophils, Absolute 0.12 10*3/mm3      Basophils, Absolute 0.01 10*3/mm3      Immature Grans, Absolute 0.01 10*3/mm3      nRBC 0.0 /100 WBC     Ethanol [[id number]] Collected: 05/05/24 1252    Specimen: Blood Updated: 05/05/24 1345     Ethanol <10 mg/dL      Ethanol % <0.010 %     Blood Culture - Blood, Arm, Left [[id number]] Collected: 05/05/24 1313    Specimen: Blood from Arm, Left Updated: 05/05/24 1317    Lactic Acid, Plasma [[id number]]  (Normal) Collected: 05/05/24 1322    Specimen: Blood Updated: 05/05/24 1346     Lactate 1.8 mmol/L     Blood Culture - Blood, Arm, Left [[id number]] Collected: 05/05/24 1413    Specimen: Blood from Arm, Left Updated: 05/05/24 1418    Urine Drug Screen - Urine, Clean Catch [[id number]]  (Abnormal) Collected: 05/05/24 1418    Specimen: Urine, Clean Catch Updated: 05/05/24 1447     Amphet/Methamphet, Screen Negative     Barbiturates Screen, Urine Negative     Benzodiazepine Screen, Urine Positive     Cocaine Screen, Urine Negative     Opiate Screen Negative     THC, Screen, Urine Negative     Methadone Screen, Urine Negative     Oxycodone Screen, Urine Negative     Fentanyl, Urine Positive    Narrative:      Negative Thresholds Per Drugs Screened:    Amphetamines                 500 ng/ml  Barbiturates                 200 ng/ml  Benzodiazepines              100 ng/ml  Cocaine                      300 ng/ml  Methadone                    300 ng/ml  Opiates                      300 ng/ml  Oxycodone                    100 ng/ml  THC                           50 ng/ml  Fentanyl                       5 ng/ml      The Normal Value for all drugs tested is negative. This report includes final unconfirmed screening results to be used for medical treatment purposes only. Unconfirmed results must not be used for non-medical purposes such as employment or legal testing. Clinical consideration should be  applied to any drug of abuse test, particularly when unconfirmed results are used.            Urinalysis With Microscopic If Indicated (No Culture) - Urine, Clean Catch [[id number]]  (Normal) Collected: 05/05/24 1418    Specimen: Urine, Clean Catch Updated: 05/05/24 1430     Color, UA Yellow     Appearance, UA Clear     pH, UA 5.5     Specific Gravity, UA 1.014     Glucose, UA Negative     Ketones, UA Negative     Bilirubin, UA Negative     Blood, UA Negative     Protein, UA Negative     Leuk Esterase, UA Negative     Nitrite, UA Negative     Urobilinogen, UA 0.2 E.U./dL    Narrative:      Urine microscopic not indicated.            Imaging Results (Last 24 Hours)       ** No results found for the last 24 hours. **            Results for orders placed during the hospital encounter of 01/08/24    Adult Transthoracic Echo Complete W/ Cont if Necessary Per Protocol    Interpretation Summary    Left ventricular systolic function is normal. Calculated left ventricular EF = 55.4%    Left ventricular diastolic function was normal.      No orders to display        Assessment/Plan     Active Hospital Problems    Diagnosis  POA    **Right arm cellulitis [L03.113]  Yes    Cellulitis of arm, right [L03.113]  Yes    Opioid use disorder, severe, dependence [F11.20]  Yes    Benzodiazepine abuse [F13.10]  Yes    CKD (chronic kidney disease) stage 2, GFR 60-89 ml/min [N18.2]  Yes    History of Depression with anxiety [F41.8]  Yes      Resolved Hospital Problems   No resolved problems to display.     Bilateral arm cellulitis from heroin injection  -Blood cultures pending  -IV vancomycin and Zosyn  -Previously seen by surgery and ID here last admission for same.  Attempted to go to [facility], but reports they would not take his insurance  -ID consulted and Ortho/hand surgery for the morning  -Pain control    Benzo abuse with patient reported history of withdrawal seizures  -Patient reports 20-30  2mg Xanax a day  -Discussed with pharmacy, Plan Xanax 2mg TID scheduled for 2 days and then Xanax 1mg for 2 days. CIWA protocol in addition to scheduled Xanax.    Opioid use disorder  -Clonidine detox order set    C3 nephropathy  CKD stage 2  -Cr near baseline, continue to monitor    Nicotine use disorder-nicotine patch.      I discussed the patient's findings and my recommendations with patient and ED provider.    VTE Prophylaxis - SCDs.  Code Status - Full code.       Kei Aguirre MD  [facility]  05/05/24  15:24 EDT

## 2024-05-05 NOTE — ED NOTES
Patient to ER via car from home for wound on R arm  Patient has multiple wounds on R arm that are black     Reports fever x 3 days 104 took tylenol this morning    Patient was her aprox 10 days ago for sepsis     Was admitted to  yesterday but left because no beds

## 2024-05-05 NOTE — ED PROVIDER NOTES
EMERGENCY DEPARTMENT ENCOUNTER    Room Number:  N442/1  Date of encounter:  5/5/2024  PCP: Provider, No Known  Historian: Patient  Relevant information and history provided by sources other than the patient will be included below and in the ED Course.  Review of pertinent past medical records may also be included in record below and ED Course.    HPI:  Chief Complaint: Worsening rash in pain in arms and all over body  A complete HPI/ROS/PMH/PSH/SH/FH are unobtainable due to: Not applicable  Context: Ryan Marina is a 43 y.o. male who presents to the ED c/o this is a patient has a history of IV and IM drug use.  Has had a history of cellulitis to his upper extremities in the forearms bilaterally secondary to injections I am of heroin.  Was recently seen here at [facility].  Please see medical history reviewed below.  He states that he went to the appointment and [facility] but he did not see anyone because they said that he did not have insurance.  He went to [facility] yesterday he was going to be admitted here he stayed in the ED for several hours and then ultimately left AMA because he felt like he was not getting adequate service.  He comes here to our emergency department today.  He complains of increased redness in his right arm is concerned he is got an infection again.  He states that he is spiking fevers again his last fever was 104 that reports last night.  His last time that he is injected heroin was this morning.  He also complains of some chills and shaking.  Complains of, diffuse body aches at times with periods of nausea.        Previous Episodes: Yes same thing in the past when he had cellulitis in his arms.  Current Symptoms: See above    MEDICAL HISTORY REVIEWED  Can see this patient was discharged 4/16/2024.  He had cellulitis of the arm from opioid use disorder with severe dependence as well as benzodiazepine abuse and anemia.  Also suffers from chronic kidney disease.   He is got a history again of IV drug use he had bilateral upper extremity wounds with extensive track marks he injects fentanyl directly into his arms he was started on IV vancomycin CT scan was performed on this admission which showed no deep infection and recommended a total of 7 days of antibiotics he did complete 6 days as an inpatient of antibiotics.  No surgery was needed patient did have an appointment with [facility] on May 1, 2024 at 8:30 AM.  Reviewed the discharge summary medicine list at discharge.      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    Vitamin B12 deficiency 04/21/2016    Vitamin D deficiency 04/21/2016    History of acute bronchitis 04/21/2016    History of acute sinusitis 04/21/2016    History of Depression with anxiety 04/21/2016    Rhabdomyolysis 01/08/2024    Cellulitis of arm 04/11/2024    Opioid use disorder, severe, dependence 04/12/2024    Benzodiazepine abuse 04/12/2024    Anemia, chronic disease 04/12/2024    CKD (chronic kidney disease) stage 2, GFR 60-89 ml/min 04/12/2024     Resolved Ambulatory Problems     Diagnosis Date Noted    No Resolved Ambulatory Problems     Past Medical History:   Diagnosis Date    Anemia     Arthritis     CHF (congestive heart failure)     Renal disorder          PAST SURGICAL HISTORY  Past Surgical History:   Procedure Laterality Date    CHOLECYSTECTOMY  10/13/2009    10/13/2009--laparoscopic cholecystectomy.    FRACTURE SURGERY      KNEE ACL RECONSTRUCTION      1996 and 1999--anterior cruciate ligament reconstruction right knee.         FAMILY HISTORY  Family History   Problem Relation Age of Onset     Diabetes Mother         Type 2    Hyperthyroidism Father     Diabetes Maternal Grandmother         Type 2         SOCIAL HISTORY  Social History     Socioeconomic History    Marital status: Single   Tobacco Use    Smoking status: Every Day     Current packs/day: 1.50     Types: Cigarettes   Vaping Use    Vaping status: Never Used   Substance and Sexual Activity    Alcohol use: No    Drug use: Yes     Frequency: 7.0 times per week     Types: Heroin     Comment: States uses daily, injecting into muscle. Approx 3G/day    Sexual activity: Defer         ALLERGIES  Patient has no known allergies.        REVIEW OF SYSTEMS  Review of Systems     All systems reviewed and negative except for those discussed in HPI.       PHYSICAL EXAM    I have reviewed the triage vital signs and nursing notes.    ED Triage Vitals   Temp Heart Rate Resp BP SpO2   05/05/24 1153 05/05/24 1153 05/05/24 1153 05/05/24 1205 05/05/24 1153   97.2 °F (36.2 °C) 115 16 132/73 98 %      Temp src Heart Rate Source Patient Position BP Location FiO2 (%)   -- -- -- -- --              GENERAL: This is a male that looks older than his stated age.  .Vital signs on my initial evaluation he is tachycardic with his heart rate in the low 100s.  He is afebrile with a normal blood pressure normal oxygen saturation.  HENT: nares patent  Head/neck/ face are symmetric without gross deformity, signs of trauma, or swelling  EYES: no scleral icterus, no conjunctival pallor.  NECK: Supple, no meningismus  CV: regular rhythm with tachycardia with heart rate in low 100s.  RESPIRATORY: normal effort and no respiratory distress.  To auscultation bilaterally  ABDOMEN: soft and nontender.  MUSCULOSKELETAL: Please see clinical photographs below.  I do not see any definitive drainage.  I do not feel any definitive fluctuance or abscess.  His right arm is worse than his left it is warm.  He has tenderness bilateral palpation to his arm.  He does have intact distal pulses there is no  cyanosis.  Capillary refill is intact.  He does not have compartment syndrome.            NEURO: alert and appropriate, moves all extremities, follows commands.  No focal motor or sensory changes  SKIN: warm, dry    Vital signs and nursing notes reviewed.        LAB RESULTS  Recent Results (from the past 24 hour(s))   Comprehensive Metabolic Panel    Collection Time: 05/05/24 12:52 PM    Specimen: Blood   Result Value Ref Range    Glucose 114 (H) 65 - 99 mg/dL    BUN 14 6 - 20 mg/dL    Creatinine 1.33 (H) 0.76 - 1.27 mg/dL    Sodium 138 136 - 145 mmol/L    Potassium 4.0 3.5 - 5.2 mmol/L    Chloride 105 98 - 107 mmol/L    CO2 23.6 22.0 - 29.0 mmol/L    Calcium 8.8 8.6 - 10.5 mg/dL    Total Protein 6.4 6.0 - 8.5 g/dL    Albumin 3.7 3.5 - 5.2 g/dL    ALT (SGPT) 10 1 - 41 U/L    AST (SGOT) 21 1 - 40 U/L    Alkaline Phosphatase 99 39 - 117 U/L    Total Bilirubin 0.2 0.0 - 1.2 mg/dL    Globulin 2.7 gm/dL    A/G Ratio 1.4 g/dL    BUN/Creatinine Ratio 10.5 7.0 - 25.0    Anion Gap 9.4 5.0 - 15.0 mmol/L    eGFR 68.0 >60.0 mL/min/1.73   Procalcitonin    Collection Time: 05/05/24 12:52 PM    Specimen: Blood   Result Value Ref Range    Procalcitonin 4.60 (H) 0.00 - 0.25 ng/mL   Sedimentation Rate    Collection Time: 05/05/24 12:52 PM    Specimen: Blood   Result Value Ref Range    Sed Rate 15 0 - 15 mm/hr   C-reactive Protein    Collection Time: 05/05/24 12:52 PM    Specimen: Blood   Result Value Ref Range    C-Reactive Protein 2.90 (H) 0.00 - 0.50 mg/dL   CBC Auto Differential    Collection Time: 05/05/24 12:52 PM    Specimen: Blood   Result Value Ref Range    WBC 4.73 3.40 - 10.80 10*3/mm3    RBC 3.37 (L) 4.14 - 5.80 10*6/mm3    Hemoglobin 9.6 (L) 13.0 - 17.7 g/dL    Hematocrit 29.5 (L) 37.5 - 51.0 %    MCV 87.5 79.0 - 97.0 fL    MCH 28.5 26.6 - 33.0 pg    MCHC 32.5 31.5 - 35.7 g/dL    RDW 14.6 12.3 - 15.4 %    RDW-SD 46.6 37.0 - 54.0 fl    MPV 9.6 6.0 - 12.0 fL    Platelets 252 140 - 450 10*3/mm3    Neutrophil % 62.0 42.7 -  76.0 %    Lymphocyte % 27.3 19.6 - 45.3 %    Monocyte % 7.8 5.0 - 12.0 %    Eosinophil % 2.5 0.3 - 6.2 %    Basophil % 0.2 0.0 - 1.5 %    Immature Grans % 0.2 0.0 - 0.5 %    Neutrophils, Absolute 2.93 1.70 - 7.00 10*3/mm3    Lymphocytes, Absolute 1.29 0.70 - 3.10 10*3/mm3    Monocytes, Absolute 0.37 0.10 - 0.90 10*3/mm3    Eosinophils, Absolute 0.12 0.00 - 0.40 10*3/mm3    Basophils, Absolute 0.01 0.00 - 0.20 10*3/mm3    Immature Grans, Absolute 0.01 0.00 - 0.05 10*3/mm3    nRBC 0.0 0.0 - 0.2 /100 WBC   Ethanol    Collection Time: 05/05/24 12:52 PM    Specimen: Blood   Result Value Ref Range    Ethanol <10 0 - 10 mg/dL    Ethanol % <0.010 %   Lactic Acid, Plasma    Collection Time: 05/05/24  1:22 PM    Specimen: Blood   Result Value Ref Range    Lactate 1.8 0.5 - 2.0 mmol/L   Urine Drug Screen - Urine, Clean Catch    Collection Time: 05/05/24  2:18 PM    Specimen: Urine, Clean Catch   Result Value Ref Range    Amphet/Methamphet, Screen Negative Negative    Barbiturates Screen, Urine Negative Negative    Benzodiazepine Screen, Urine Positive (A) Negative    Cocaine Screen, Urine Negative Negative    Opiate Screen Negative Negative    THC, Screen, Urine Negative Negative    Methadone Screen, Urine Negative Negative    Oxycodone Screen, Urine Negative Negative    Fentanyl, Urine Positive (A) Negative   Urinalysis With Microscopic If Indicated (No Culture) - Urine, Clean Catch    Collection Time: 05/05/24  2:18 PM    Specimen: Urine, Clean Catch   Result Value Ref Range    Color, UA Yellow Yellow, Straw    Appearance, UA Clear Clear    pH, UA 5.5 5.0 - 8.0    Specific Gravity, UA 1.014 1.005 - 1.030    Glucose, UA Negative Negative    Ketones, UA Negative Negative    Bilirubin, UA Negative Negative    Blood, UA Negative Negative    Protein, UA Negative Negative    Leuk Esterase, UA Negative Negative    Nitrite, UA Negative Negative    Urobilinogen, UA 0.2 E.U./dL 0.2 - 1.0 E.U./dL       Ordered the above labs and  independently reviewed the results.        RADIOLOGY  No Radiology Exams Resulted Within Past 24 Hours    I ordered the above noted radiological studies. Reviewed by me and discussed with radiologist.  See dictation for official radiology interpretation.      PROCEDURES    Procedures      MEDICATIONS GIVEN IN ER    Medications   sodium chloride 0.9 % flush 10 mL (has no administration in time range)   sodium chloride 0.9 % flush 10 mL (10 mL Intravenous Given 5/5/24 2029)   sodium chloride 0.9 % flush 10 mL (has no administration in time range)   sodium chloride 0.9 % infusion 40 mL (has no administration in time range)   HYDROcodone-acetaminophen (NORCO) 5-325 MG per tablet 1 tablet (has no administration in time range)   HYDROmorphone (DILAUDID) injection 0.5 mg (has no administration in time range)     And   naloxone (NARCAN) injection 0.4 mg (has no administration in time range)   sennosides-docusate (PERICOLACE) 8.6-50 MG per tablet 2 tablet (2 tablets Oral Not Given 5/5/24 2033)     And   polyethylene glycol (MIRALAX) packet 17 g (has no administration in time range)     And   bisacodyl (DULCOLAX) EC tablet 5 mg (has no administration in time range)     And   bisacodyl (DULCOLAX) suppository 10 mg (has no administration in time range)   nicotine (NICODERM CQ) 21 MG/24HR patch 1 patch (1 patch Transdermal Medication Applied 5/5/24 1538)   nicotine polacrilex (NICORETTE) gum 4 mg (has no administration in time range)   Magnesium Standard Dose Replacement - Follow Nurse / BPA Driven Protocol (has no administration in time range)   LORazepam (ATIVAN) tablet 1 mg (has no administration in time range)     Or   midazolam (VERSED) injection 2 mg (has no administration in time range)     Or   LORazepam (ATIVAN) tablet 2 mg (has no administration in time range)     Or   midazolam (VERSED) injection 4 mg (has no administration in time range)     Or   midazolam (VERSED) injection 4 mg (has no administration in time range)      Or   midazolam (VERSED) injection 4 mg (has no administration in time range)   cloNIDine (CATAPRES) tablet 0.1 mg (0.1 mg Oral Given 5/5/24 2058)     Followed by   cloNIDine (CATAPRES) tablet 0.1 mg (has no administration in time range)     Followed by   cloNIDine (CATAPRES) tablet 0.1 mg (has no administration in time range)     Followed by   cloNIDine (CATAPRES) tablet 0.1 mg (has no administration in time range)     Followed by   cloNIDine (CATAPRES) tablet 0.1 mg (has no administration in time range)   ondansetron ODT (ZOFRAN-ODT) disintegrating tablet 4 mg (has no administration in time range)     Or   ondansetron (ZOFRAN) injection 4 mg (has no administration in time range)   Pharmacy to Dose Zosyn (has no administration in time range)   Pharmacy to dose vancomycin (has no administration in time range)   ALPRAZolam (XANAX) tablet 2 mg (2 mg Oral Given 5/5/24 2032)     Followed by   ALPRAZolam (XANAX) tablet 1 mg (has no administration in time range)   piperacillin-tazobactam (ZOSYN) 3.375 g IVPB in 100 mL NS MBP (CD) (3.375 g Intravenous New Bag 5/5/24 2030)   Vancomycin Pharmacy Intermittent/Pulse Dosing (has no administration in time range)   Vancomycin HCl 1,250 mg in sodium chloride 0.9 % 250 mL VTB (1,250 mg Intravenous New Bag 5/5/24 1500)   piperacillin-tazobactam (ZOSYN) 3.375 g IVPB in 100 mL NS MBP (CD) (0 g Intravenous Stopped 5/5/24 1456)   HYDROcodone-acetaminophen (NORCO) 7.5-325 MG per tablet 1 tablet (1 tablet Oral Given 5/5/24 1455)   LORazepam (ATIVAN) tablet 1 mg (1 mg Oral Given 5/5/24 1508)         All labs have been independently reviewed by me.  All radiology studies have been reviewed by me and I discussed with radiologist dictating the report when indicated below.  All EKG's independently viewed and interpreted by me.  Discussion below represents my analysis of pertinent findings related to patient's condition, differential diagnosis, treatment plan and final  disposition.        PROGRESS, DATA ANALYSIS, CONSULTS, AND MEDICAL DECISION MAKING    This is a gentleman who unfortunately suffers from recurrent drug abuse consistent with I the and IM injections of heroin.  He has had history of cellulitis in his arms that is likely what he is got recurrent.  His right arm is worse than his left.  His last use of heroin was this morning so I think it is unlikely the chills and bodyaches are due to withdrawal.  He reports a fever of 104 last night.  He has no primary care doctor.  Him and start him on some IV vancomycin is what he was started on last admission.  Will check inflammatory markers.  All questions answered at this time.      ED Course as of 05/05/24 2102   Sun May 05, 2024   1235 First look:  43-year-old male with a history of IV and subcutaneous injectable drug use presents with complaint of continued pain bilateral upper extremities and body aches.  Exam: Chronic ill-appearing but not overtly toxic.  No respiratory distress.  Innumerable subacute lesions bilateral upper extremities with some hyperemia.  Plan: Laboratory evaluation. [RS]   1321 Hemoglobin(!): 9.6  Patient's hemoglobin 2 weeks ago was about the 10 range. [MM]   1332 C-Reactive Protein(!): 2.90 [MM]   1332 WBC: 4.73 [MM]   1332 Sed Rate: 15 [MM]   1355 Procalcitonin(!): 4.60 [MM]   1513 I did discuss the case with Dr. Kei Haile.  Informed of the patient's presenting symptoms and he agrees to admit the patient to the hospital.  It is okay with him if I go ahead and give the patient a dose of hydrocodone here. [MM]      ED Course User Index  [MM] Ryan Davis MD  [RS] Christiano Sanford MD       AS OF 21:02 EDT VITALS:    BP - 117/67  HR - 84  TEMP - 98.6 °F (37 °C) (Oral)  02 SATS - 99%    SOCIAL DETERMINANTS OF HEALTH THAT IMPACT OR LIMIT CARE (For example..Homelessness,safe discharge, inability to obtain care, follow up, or prescriptions):      DIAGNOSIS  Final diagnoses:   Cellulitis of right arm:  Recurrent from recurrent injections of IM heroin   Opioid abuse   Benzodiazepine abuse   H/O noncompliance with medical treatment, presenting hazards to health         DISPOSITION  I have reviewed the test results with my patient and explained the current treatment plan.  I answered all the patient's questions and concerns.  The patient is hemodynamically stable and is in no distress and is appropriate to be admitted to a medical/surgical floor bed at this time.            DICTATED UTILIZING DRAGON DICTATION    Note Disclaimer: At [facility], we believe that sharing information builds trust and better relationships. You are receiving this note because you recently visited [facility]. It is possible you will see health information before a provider has talked with you about it. This kind of information can be easy to misunderstand. To help you fully understand what it means for your health, we urge you to discuss this note with your provider.       Ryan Davis MD  05/05/24 1658

## 2024-05-05 NOTE — Clinical Note
Level of Care: Telemetry [5]   Diagnosis: Right arm cellulitis [[id number]]   Admitting Physician: WHIT BAUM [[id number]]   Attending Physician: WHIT BAUM [[id number]]   Certification: I Certify That Inpatient Hospital Services Are Medically Necessary For Greater Than 2 Midnights

## 2024-05-05 NOTE — ED NOTES
Nursing report ED to floor  Ryan Marina  43 y.o.  male    HPI :  HPI (Adult)  Stated Reason for Visit: wound check    Chief Complaint  Chief Complaint   Patient presents with    Wound Check       Admitting doctor:   Kei Aguirre MD    Admitting diagnosis:   There were no encounter diagnoses.    Code status:   Current Code Status       Date Active Code Status Order ID Comments User Context       5/5/2024 1506 CPR (Attempt to Resuscitate) [id number]  Kei Aguirre MD ED        Question Answer    Code Status (Patient has no pulse and is not breathing) CPR (Attempt to Resuscitate)    Medical Interventions (Patient has pulse or is breathing) Full Support                    Allergies:   Patient has no known allergies.    Isolation:   No active isolations    Intake and Output    Intake/Output Summary (Last 24 hours) at 5/5/2024 1509  Last data filed at 5/5/2024 1456  Gross per 24 hour   Intake 100 ml   Output --   Net 100 ml       Weight:       05/05/24  1205   Weight: 59 kg (130 lb)       Most recent vitals:   Vitals:    05/05/24 1330 05/05/24 1400 05/05/24 1429 05/05/24 1459   BP: 106/64 108/69 120/75 119/73   Pulse: 87 82 95 89   Resp:  16  16   Temp:       SpO2: 100% 100% 94% 100%   Weight:       Height:           Active LDAs/IV Access:   Lines, Drains & Airways       Active LDAs       Name Placement date Placement time Site Days    Peripheral IV 05/05/24 1248 Anterior;Right;Upper Arm 05/05/24  1248  Arm  less than 1                    Labs (abnormal labs have a star):   Labs Reviewed   COMPREHENSIVE METABOLIC PANEL - Abnormal; Notable for the following components:       Result Value    Glucose 114 (*)     Creatinine 1.33 (*)     All other components within normal limits    Narrative:     GFR Normal >60  Chronic Kidney Disease <60  Kidney Failure <15     PROCALCITONIN - Abnormal; Notable for the following components:    Procalcitonin 4.60 (*)     All other components within normal limits    Narrative:     As a Marker  "for Sepsis (Non-Neonates):    1. <0.5 ng/mL represents a low risk of severe sepsis and/or septic shock.  2. >2 ng/mL represents a high risk of severe sepsis and/or septic shock.    As a Marker for Lower Respiratory Tract Infections that require antibiotic therapy:    PCT on Admission    Antibiotic Therapy       6-12 Hrs later    >0.5                Strongly Recommended  >0.25 - <0.5        Recommended   0.1 - 0.25          Discouraged              Remeasure/reassess PCT  <0.1                Strongly Discouraged     Remeasure/reassess PCT    As 28 day mortality risk marker: \"Change in Procalcitonin Result\" (>80% or <=80%) if Day 0 (or Day 1) and Day 4 values are available. Refer to [email or web address]-pct-[email or web address]    Change in PCT <=80%  A decrease of PCT levels below or equal to 80% defines a positive change in PCT test result representing a higher risk for 28-day all-cause mortality of patients diagnosed with severe sepsis for septic shock.    Change in PCT >80%  A decrease of PCT levels of more than 80% defines a negative change in PCT result representing a lower risk for 28-day all-cause mortality of patients diagnosed with severe sepsis or septic shock.      C-REACTIVE PROTEIN - Abnormal; Notable for the following components:    C-Reactive Protein 2.90 (*)     All other components within normal limits   CBC WITH AUTO DIFFERENTIAL - Abnormal; Notable for the following components:    RBC 3.37 (*)     Hemoglobin 9.6 (*)     Hematocrit 29.5 (*)     All other components within normal limits   URINE DRUG SCREEN - Abnormal; Notable for the following components:    Benzodiazepine Screen, Urine Positive (*)     Fentanyl, Urine Positive (*)     All other components within normal limits    Narrative:     Negative Thresholds Per Drugs Screened:    Amphetamines                 500 ng/ml  Barbiturates                 200 ng/ml  Benzodiazepines              100 ng/ml  Cocaine                      300 ng/ml  Methadone       "              300 ng/ml  Opiates                      300 ng/ml  Oxycodone                    100 ng/ml  THC                           50 ng/ml  Fentanyl                       5 ng/ml      The Normal Value for all drugs tested is negative. This report includes final unconfirmed screening results to be used for medical treatment purposes only. Unconfirmed results must not be used for non-medical purposes such as employment or legal testing. Clinical consideration should be applied to any drug of abuse test, particularly when unconfirmed results are used.           LACTIC ACID, PLASMA - Normal   SEDIMENTATION RATE - Normal   URINALYSIS W/ MICROSCOPIC IF INDICATED (NO CULTURE) - Normal    Narrative:     Urine microscopic not indicated.   BLOOD CULTURE   BLOOD CULTURE   ETHANOL   CBC AND DIFFERENTIAL    Narrative:     The following orders were created for panel order CBC & Differential.  Procedure                               Abnormality         Status                     ---------                               -----------         ------                     CBC Auto Differential[[id number]]        Abnormal            Final result                 Please view results for these tests on the individual orders.       EKG:   No orders to display       Meds given in ED:   Medications   sodium chloride 0.9 % flush 10 mL (has no administration in time range)   Vancomycin HCl 1,250 mg in sodium chloride 0.9 % 250 mL VTB (1,250 mg Intravenous New Bag 5/5/24 1500)   sodium chloride 0.9 % flush 10 mL (has no administration in time range)   sodium chloride 0.9 % flush 10 mL (has no administration in time range)   sodium chloride 0.9 % infusion 40 mL (has no administration in time range)   HYDROcodone-acetaminophen (NORCO) 5-325 MG per tablet 1 tablet (has no administration in time range)   HYDROmorphone (DILAUDID) injection 0.5 mg (has no administration in time range)     And   naloxone (NARCAN) injection 0.4 mg (has no administration  in time range)   sennosides-docusate (PERICOLACE) 8.6-50 MG per tablet 2 tablet (has no administration in time range)     And   polyethylene glycol (MIRALAX) packet 17 g (has no administration in time range)     And   bisacodyl (DULCOLAX) EC tablet 5 mg (has no administration in time range)     And   bisacodyl (DULCOLAX) suppository 10 mg (has no administration in time range)   nicotine (NICODERM CQ) 21 MG/24HR patch 1 patch (has no administration in time range)   nicotine polacrilex (NICORETTE) gum 4 mg (has no administration in time range)   Magnesium Standard Dose Replacement - Follow Nurse / BPA Driven Protocol (has no administration in time range)   LORazepam (ATIVAN) tablet 2 mg (has no administration in time range)     Followed by   LORazepam (ATIVAN) tablet 1 mg (has no administration in time range)   LORazepam (ATIVAN) tablet 1 mg (has no administration in time range)     Or   midazolam (VERSED) injection 2 mg (has no administration in time range)     Or   LORazepam (ATIVAN) tablet 2 mg (has no administration in time range)     Or   midazolam (VERSED) injection 4 mg (has no administration in time range)     Or   midazolam (VERSED) injection 4 mg (has no administration in time range)     Or   midazolam (VERSED) injection 4 mg (has no administration in time range)   PHENobarbital 310.7 mg in sodium chloride 0.9 % 100 mL IVPB (has no administration in time range)     Followed by   PHENobarbital 232.7 mg in sodium chloride 0.9 % 100 mL IVPB (has no administration in time range)     Followed by   PHENobarbital 232.7 mg in sodium chloride 0.9 % 100 mL IVPB (has no administration in time range)   PHENobarbital injection 65 mg (has no administration in time range)     Followed by   PHENobarbital injection 65 mg (has no administration in time range)     Followed by   PHENobarbital tablet 32.4 mg (has no administration in time range)     Followed by   PHENobarbital tablet 32.4 mg (has no administration in time range)      Followed by   PHENobarbital tablet 32.4 mg (has no administration in time range)   cloNIDine (CATAPRES) tablet 0.1 mg (has no administration in time range)     Followed by   cloNIDine (CATAPRES) tablet 0.1 mg (has no administration in time range)     Followed by   cloNIDine (CATAPRES) tablet 0.1 mg (has no administration in time range)     Followed by   cloNIDine (CATAPRES) tablet 0.1 mg (has no administration in time range)     Followed by   cloNIDine (CATAPRES) tablet 0.1 mg (has no administration in time range)   ondansetron ODT (ZOFRAN-ODT) disintegrating tablet 4 mg (has no administration in time range)     Or   ondansetron (ZOFRAN) injection 4 mg (has no administration in time range)   piperacillin-tazobactam (ZOSYN) 3.375 g IVPB in 100 mL NS MBP (CD) (0 g Intravenous Stopped 5/5/24 1456)   HYDROcodone-acetaminophen (NORCO) 7.5-325 MG per tablet 1 tablet (1 tablet Oral Given 5/5/24 1455)   LORazepam (ATIVAN) tablet 1 mg (1 mg Oral Given 5/5/24 1508)       Imaging results:  No radiology results for the last day    Ambulatory status:   - ambulates with cane.    Social issues:   Social History     Socioeconomic History    Marital status: Single   Tobacco Use    Smoking status: Every Day     Current packs/day: 1.50     Types: Cigarettes   Vaping Use    Vaping status: Never Used   Substance and Sexual Activity    Alcohol use: No    Drug use: Yes     Frequency: 7.0 times per week     Types: Heroin     Comment: States uses daily, injecting into muscle. Approx 3G/day    Sexual activity: Defer       Peripheral Neurovascular  Peripheral Neurovascular (Adult)  Peripheral Neurovascular WDL: .WDL except  Additional Documentation: Edema (Group)  Edema  Edema: arm, left, arm, right  Arm, Left Edema: 4+ (Severe), other (see comments) (Wounds/Redness)  Arm, Right Edema: 4+ (Severe), other (see comments) (Wounds/Redness)    Neuro Cognitive  Neuro Cognitive (Adult)  Cognitive/Neuro/Behavioral WDL: WDL    Learning  Learning  "Assessment (Adult)  Learning Readiness and Ability: financial/insurance concerns noted, psychosocial barrier noted    Respiratory  Respiratory WDL  Respiratory WDL: WDL    Abdominal Pain       Pain Assessments  Pain (Adult)  (0-10) Pain Rating: Rest: 10 (\"everywhere\")    NIH Stroke Scale       Kelsey Guerra RN  05/05/24 15:09 EDT    "

## 2024-05-05 NOTE — PROGRESS NOTES
[facility] Clinical Pharmacy Services: Piperacillin-Tazobactam Consult    Pt Name: Ryan Marina   : 1980    Indication: Skin and Soft Tissue    Relevant clinical data and objective history reviewed:    Past Medical History:   Diagnosis Date    Anemia     Arthritis     CHF (congestive heart failure)     Renal disorder     Vitamin D deficiency      Creatinine   Date Value Ref Range Status   2024 1.33 (H) 0.76 - 1.27 mg/dL Final   2024 1.14 0.76 - 1.27 mg/dL Final   04/15/2024 1.03 0.76 - 1.27 mg/dL Final   2023 1.77 (H) 0.73 - 1.18 mg/dL Final   11/15/2022 2.11 (H) 0.73 - 1.18 mg/dL Final   10/14/2022 1.35 (H) 0.73 - 1.18 mg/dL Final     BUN   Date Value Ref Range Status   2024 14 6 - 20 mg/dL Final   2023 36 (H) 9 - 21 mg/dL Final     Estimated Creatinine Clearance: 59.8 mL/min (A) (by C-G formula based on SCr of 1.33 mg/dL (H)).    Lab Results   Component Value Date    WBC 4.73 2024     Temp Readings from Last 3 Encounters:   24 98.1 °F (36.7 °C) (Oral)   24 97.6 °F (36.4 °C) (Oral)   01/10/24 97.9 °F (36.6 °C) (Oral)      Assessment/Plan  Estimated CrCl >20 mL/min at this time; BMI 17.63 kg/m2  Will start piperacillin-tazobactam 3.375 g IV every 8 hours for 7 days    Pharmacy will continue to follow daily while on piperacillin-tazobactam and adjust as needed. Thank you for this consult.    Jessy Roa [facility]  Clinical Pharmacist

## 2024-05-05 NOTE — PLAN OF CARE
Patient admitted today from the ER. COWS and CIWA protocol in place. IV antibiotics ordered. ID, Ortho, and access consulted. +tox screen. Patient expresses no other needs at this time. Call light within reach.

## 2024-05-06 LAB
ANION GAP SERPL CALCULATED.3IONS-SCNC: 8.2 MMOL/L (ref 5–15)
BUN SERPL-MCNC: 8 MG/DL (ref 6–20)
BUN/CREAT SERPL: 7.7 (ref 7–25)
CALCIUM SPEC-SCNC: 8.8 MG/DL (ref 8.6–10.5)
CHLORIDE SERPL-SCNC: 108 MMOL/L (ref 98–107)
CO2 SERPL-SCNC: 22.8 MMOL/L (ref 22–29)
CREAT SERPL-MCNC: 1.04 MG/DL (ref 0.76–1.27)
DEPRECATED RDW RBC AUTO: 43.1 FL (ref 37–54)
EGFRCR SERPLBLD CKD-EPI 2021: 91.4 ML/MIN/1.73
ERYTHROCYTE [DISTWIDTH] IN BLOOD BY AUTOMATED COUNT: 14.2 % (ref 12.3–15.4)
GLUCOSE SERPL-MCNC: 108 MG/DL (ref 65–99)
HCT VFR BLD AUTO: 28 % (ref 37.5–51)
HGB BLD-MCNC: 9.2 G/DL (ref 13–17.7)
MCH RBC QN AUTO: 27.3 PG (ref 26.6–33)
MCHC RBC AUTO-ENTMCNC: 32.9 G/DL (ref 31.5–35.7)
MCV RBC AUTO: 83.1 FL (ref 79–97)
PLATELET # BLD AUTO: 270 10*3/MM3 (ref 140–450)
PMV BLD AUTO: 9.3 FL (ref 6–12)
POTASSIUM SERPL-SCNC: 4.2 MMOL/L (ref 3.5–5.2)
RBC # BLD AUTO: 3.37 10*6/MM3 (ref 4.14–5.8)
SODIUM SERPL-SCNC: 139 MMOL/L (ref 136–145)
VANCOMYCIN SERPL-MCNC: 8.4 MCG/ML (ref 5–40)
WBC NRBC COR # BLD AUTO: 4.92 10*3/MM3 (ref 3.4–10.8)

## 2024-05-06 PROCEDURE — 85027 COMPLETE CBC AUTOMATED: CPT | Performed by: STUDENT IN AN ORGANIZED HEALTH CARE EDUCATION/TRAINING PROGRAM

## 2024-05-06 PROCEDURE — 80202 ASSAY OF VANCOMYCIN: CPT | Performed by: STUDENT IN AN ORGANIZED HEALTH CARE EDUCATION/TRAINING PROGRAM

## 2024-05-06 PROCEDURE — 99223 1ST HOSP IP/OBS HIGH 75: CPT | Performed by: STUDENT IN AN ORGANIZED HEALTH CARE EDUCATION/TRAINING PROGRAM

## 2024-05-06 PROCEDURE — 25010000002 VANCOMYCIN 10 G RECONSTITUTED SOLUTION: Performed by: INTERNAL MEDICINE

## 2024-05-06 PROCEDURE — 25010000002 HYDROMORPHONE PER 4 MG: Performed by: STUDENT IN AN ORGANIZED HEALTH CARE EDUCATION/TRAINING PROGRAM

## 2024-05-06 PROCEDURE — 25010000002 ONDANSETRON PER 1 MG: Performed by: STUDENT IN AN ORGANIZED HEALTH CARE EDUCATION/TRAINING PROGRAM

## 2024-05-06 PROCEDURE — 25010000002 PIPERACILLIN SOD-TAZOBACTAM PER 1 G: Performed by: STUDENT IN AN ORGANIZED HEALTH CARE EDUCATION/TRAINING PROGRAM

## 2024-05-06 PROCEDURE — 25010000002 CEFTRIAXONE PER 250 MG: Performed by: STUDENT IN AN ORGANIZED HEALTH CARE EDUCATION/TRAINING PROGRAM

## 2024-05-06 PROCEDURE — 25810000003 SODIUM CHLORIDE 0.9 % SOLUTION: Performed by: INTERNAL MEDICINE

## 2024-05-06 PROCEDURE — 80048 BASIC METABOLIC PNL TOTAL CA: CPT | Performed by: STUDENT IN AN ORGANIZED HEALTH CARE EDUCATION/TRAINING PROGRAM

## 2024-05-06 RX ORDER — VANCOMYCIN/0.9 % SOD CHLORIDE 1.5G/250ML
1500 PLASTIC BAG, INJECTION (ML) INTRAVENOUS EVERY 24 HOURS
Status: DISCONTINUED | OUTPATIENT
Start: 2024-05-06 | End: 2024-05-08

## 2024-05-06 RX ORDER — CYCLOBENZAPRINE HCL 10 MG
10 TABLET ORAL 3 TIMES DAILY PRN
Status: DISCONTINUED | OUTPATIENT
Start: 2024-05-06 | End: 2024-05-09 | Stop reason: HOSPADM

## 2024-05-06 RX ORDER — GABAPENTIN 400 MG/1
400 CAPSULE ORAL 4 TIMES DAILY
Status: DISCONTINUED | OUTPATIENT
Start: 2024-05-06 | End: 2024-05-09 | Stop reason: HOSPADM

## 2024-05-06 RX ADMIN — VANCOMYCIN HYDROCHLORIDE 1500 MG: 10 INJECTION, POWDER, LYOPHILIZED, FOR SOLUTION INTRAVENOUS at 12:33

## 2024-05-06 RX ADMIN — PIPERACILLIN AND TAZOBACTAM 3.38 G: 3; .375 INJECTION, POWDER, FOR SOLUTION INTRAVENOUS at 06:01

## 2024-05-06 RX ADMIN — CEFTRIAXONE 2000 MG: 2 INJECTION, POWDER, FOR SOLUTION INTRAMUSCULAR; INTRAVENOUS at 09:51

## 2024-05-06 RX ADMIN — CLONIDINE HYDROCHLORIDE 0.1 MG: 0.1 TABLET ORAL at 10:00

## 2024-05-06 RX ADMIN — GABAPENTIN 400 MG: 400 CAPSULE ORAL at 23:29

## 2024-05-06 RX ADMIN — ALPRAZOLAM 2 MG: 1 TABLET ORAL at 15:40

## 2024-05-06 RX ADMIN — LORAZEPAM 2 MG: 1 TABLET ORAL at 01:29

## 2024-05-06 RX ADMIN — HYDROCODONE BITARTRATE AND ACETAMINOPHEN 1 TABLET: 5; 325 TABLET ORAL at 09:51

## 2024-05-06 RX ADMIN — CYCLOBENZAPRINE 10 MG: 10 TABLET, FILM COATED ORAL at 23:29

## 2024-05-06 RX ADMIN — ALPRAZOLAM 2 MG: 1 TABLET ORAL at 23:29

## 2024-05-06 RX ADMIN — SENNOSIDES AND DOCUSATE SODIUM 2 TABLET: 50; 8.6 TABLET ORAL at 09:51

## 2024-05-06 RX ADMIN — GABAPENTIN 400 MG: 400 CAPSULE ORAL at 12:33

## 2024-05-06 RX ADMIN — CLONIDINE HYDROCHLORIDE 0.1 MG: 0.1 TABLET ORAL at 01:30

## 2024-05-06 RX ADMIN — HYDROCODONE BITARTRATE AND ACETAMINOPHEN 1 TABLET: 5; 325 TABLET ORAL at 01:30

## 2024-05-06 RX ADMIN — ONDANSETRON 4 MG: 2 INJECTION INTRAMUSCULAR; INTRAVENOUS at 09:56

## 2024-05-06 RX ADMIN — Medication 10 ML: at 09:51

## 2024-05-06 RX ADMIN — HYDROMORPHONE HYDROCHLORIDE 0.5 MG: 1 INJECTION, SOLUTION INTRAMUSCULAR; INTRAVENOUS; SUBCUTANEOUS at 13:32

## 2024-05-06 RX ADMIN — HYDROCODONE BITARTRATE AND ACETAMINOPHEN 1 TABLET: 5; 325 TABLET ORAL at 23:29

## 2024-05-06 RX ADMIN — NICOTINE 1 PATCH: 21 PATCH, EXTENDED RELEASE TRANSDERMAL at 15:41

## 2024-05-06 RX ADMIN — GABAPENTIN 400 MG: 400 CAPSULE ORAL at 18:17

## 2024-05-06 RX ADMIN — ALPRAZOLAM 2 MG: 1 TABLET ORAL at 09:51

## 2024-05-06 NOTE — PLAN OF CARE
Problem: Adult Inpatient Plan of Care  Goal: Plan of Care Review  5/6/2024 0525 by Kate Zacarias, Nursing Student  Outcome: Ongoing, Progressing  Flowsheets (Taken 5/6/2024 0525)  Plan of Care Reviewed With: patient (Pended)  Outcome Evaluation: Patient admitted  5/5 from the ER for arm cellulitis. pt asking for meds to prevent detox, COWS and CIWA scores noted, medicated per MAR and protocol in place. Pt appeared to be sleeping with unlabored and easy respirations throughout the shift and has been awakened for each assessment. Pt has not used the call bell to notify staff of any C/O's of pain or discomfort. R and L forearm reddened with multiple scabs, no drainage noted,  no seizures noted. IV antibiotics continued. Safety maintained. (Pended)  5/6/2024 0433 by Kate Zacarias, Nursing Student  Outcome: Ongoing, Progressing  Flowsheets (Taken 5/6/2024 0433)  Plan of Care Reviewed With: patient   Goal Outcome Evaluation:  Plan of Care Reviewed With: (P) patient

## 2024-05-06 NOTE — CONSULTS
Orthopaedic Consultation      Patient: Ryan Marina    Date of Admission: 2024 12:11 PM    YOB: 1980    Medical Record Number: [id number]    Attending Physician:  Veto Fernando*    Consulting Physician:  Mustapha Salazar PA-C        Chief Complaints: Right arm and left arm cellulitis with multitude of wounds    History of Present Illness: 43 y.o. male admitted to [facility] with right and left arm cellulitis with multitude of wounds.  History of IVDU. I was consulted for further evaluation and treatment.  Patient reports several months of right and left arm pain.  History of IV drug use.  Reports he was seen in the emergency room previously and given a appointment with [facility].  However, states his appointment was canceled and he was told they do not take his insurance.  Patient returned to the emergency room due to the pain in his arms.  Patient states that his wounds do correspond to heroin injection sites.  Reports drainage from wounds intermittently.    Allergies: No Known Allergies    Medications:   Home Medications:  Medications Prior to Admission   Medication Sig Dispense Refill Last Dose    nicotine (NICODERM CQ) 14 MG/24HR patch Place 1 patch on the skin as directed by provider Daily. 28 patch 0 2024       Current Medications:  Scheduled Meds:ALPRAZolam, 2 mg, Oral, TID   Followed by  [START ON 2024] ALPRAZolam, 1 mg, Oral, TID  cefTRIAXone, 2,000 mg, Intravenous, Q24H  gabapentin, 400 mg, Oral, 4x Daily  nicotine, 1 patch, Transdermal, Q24H  senna-docusate sodium, 2 tablet, Oral, BID  sodium chloride, 10 mL, Intravenous, Q12H  vancomycin, 1,500 mg, Intravenous, Q24H      Continuous Infusions:Pharmacy to dose vancomycin,       PRN Meds:.  senna-docusate sodium **AND** polyethylene glycol **AND** bisacodyl **AND** bisacodyl    [] cloNIDine **FOLLOWED BY** cloNIDine **FOLLOWED BY** [START ON 2024] cloNIDine **FOLLOWED BY** [START ON  5/8/2024] cloNIDine **FOLLOWED BY** [START ON 5/9/2024] cloNIDine    cyclobenzaprine    HYDROcodone-acetaminophen    HYDROmorphone **AND** naloxone    LORazepam **OR** midazolam **OR** LORazepam **OR** midazolam **OR** midazolam **OR** midazolam    Magnesium Standard Dose Replacement - Follow Nurse / BPA Driven Protocol    nicotine polacrilex    ondansetron ODT **OR** ondansetron    Pharmacy to dose vancomycin    [COMPLETED] Insert Peripheral IV **AND** sodium chloride    sodium chloride    sodium chloride    Past Medical History:   Diagnosis Date    Anemia     Arthritis     CHF (congestive heart failure)     Renal disorder     Vitamin D deficiency      Past Surgical History:   Procedure Laterality Date    CHOLECYSTECTOMY  10/13/2009    10/13/2009--laparoscopic cholecystectomy.    FRACTURE SURGERY      KNEE ACL RECONSTRUCTION      1996 and 1999--anterior cruciate ligament reconstruction right knee.     Social History     Occupational History    Occupation: Works at [employer]   Tobacco Use    Smoking status: Every Day     Current packs/day: 1.50     Types: Cigarettes    Smokeless tobacco: Not on file   Vaping Use    Vaping status: Never Used   Substance and Sexual Activity    Alcohol use: No    Drug use: Yes     Frequency: 7.0 times per week     Types: Heroin     Comment: States uses daily, injecting into muscle. Approx 3G/day    Sexual activity: Defer      Social History     Social History Narrative    Not on file     Family History   Problem Relation Age of Onset    Diabetes Mother         Type 2    Hyperthyroidism Father     Diabetes Maternal Grandmother         Type 2         Review of Systems:   No other pertinent positives or negatives other than what is mentioned in the HPI and below.  Constitutional: Negative for fatigue, fever, or weight loss  HEENT: No active headache.  Pulmonary: Patient denies SOA.  Cardiovascular: Patient denies any chest pain.  Gastrointestinal:  Patient denies active  vomiting or diarrhea.  Musculoskeletal: Positive for bilateral forearm cellulitis and multitude of wounds in various stages of healing.  Neurological: Patient denies active dizziness or loss of consciousness.  Skin: Patient denies any active bleeding.    Vital signs in last 24 hours:  Temp:  [98.1 °F (36.7 °C)-99.5 °F (37.5 °C)] 98.1 °F (36.7 °C)  Heart Rate:  [80-99] 80  Resp:  [18] 18  BP: (102-121)/(67-70) 105/70  Vitals:    05/05/24 1927 05/05/24 2339 05/06/24 0718 05/06/24 1321   BP: 117/67 121/68 102/68 105/70   BP Location: Right arm Right arm Left arm Left arm   Patient Position: Lying Lying Lying Lying   Pulse: 84 99 90 80   Resp: 18 18 18 18   Temp: 98.6 °F (37 °C) 99.5 °F (37.5 °C) 98.4 °F (36.9 °C) 98.1 °F (36.7 °C)   TempSrc: Oral Oral Oral Oral   SpO2: 99% 97% 99% 100%   Weight:       Height:              Physical Exam: 43 y.o. male         General Appearance:  Alert, cooperative, in no acute distress    HEENT:    Atraumatic, Pupils are equal   Neck:   Cervical spine midline, no appreciable JVD   Lungs:     Breathing non-labored and chest rise symmetric    Heart:   Abdomen:     Rectal:       Extremities:   Pulses  Neurovascular:   Skin:   Musculoskeletal:      Pulse regular    Soft, Non-tender or distended    Deferred        No clubbing, cyanosis, or edema    Intact    Cranial nerves 2 - 12 grossly intact, sensation intact    No skin lesions  Bilateral forearm dorsal aspect cellulitis and multitude of wounds, right deeper and more severe in appearance than left.  Tense edema and tender to palpation.  Compartment compressible.  Neurovascularly intact distally  strength 3-5 bilaterally full elbow range of motion.  Wrist motion intact.     Diagnostic Tests:    Results from last 7 days   Lab Units 05/06/24  0440   WBC 10*3/mm3 4.92   HEMOGLOBIN g/dL 9.2*   HEMATOCRIT % 28.0*   PLATELETS 10*3/mm3 270     Results from last 7 days   Lab Units 05/06/24  0440   SODIUM mmol/L 139   POTASSIUM mmol/L 4.2    CHLORIDE mmol/L 108*   CO2 mmol/L 22.8   BUN mg/dL 8   CREATININE mg/dL 1.04   GLUCOSE mg/dL 108*   CALCIUM mg/dL 8.8             Assessment:    Right arm cellulitis    History of Depression with anxiety    Opioid use disorder, severe, dependence    Benzodiazepine abuse    Anemia, chronic disease    CKD (chronic kidney disease) stage 2, GFR 60-89 ml/min    Cellulitis of arm, right        Plan: Discussed treatment and diagnostic options with patient  At this time, no surgery indicated  Continue with IV antibiotics per infectious disease  Will follow peripherally  Discussed either cessation of IV drugs or at minimum use of clean drug paraphernalia  All questions answered to best of ability        Date: 5/6/2024  Mustapha Salazar PA-C

## 2024-05-06 NOTE — OUTREACH NOTE
Medical Week 3 Survey      Flowsheet Row Responses   [facility] patient discharged from? [facility]   Does the patient have one of the following disease processes/diagnoses(primary or secondary)? Other   Week 3 attempt successful? No   Unsuccessful attempts Attempt 1   Revoke Readmitted            Cyndi BOSCH - Registered Nurse

## 2024-05-06 NOTE — PROGRESS NOTES
"Nutrition Services    Patient Name:  Ryan Marina  YOB: 1980  MRN: [id number]  Admit Date:  5/5/2024    Assessment Date:  05/06/24    Summary: BMI 17.6    Nutrition assessment initiated per BMI of 17.63. 43yoM admitted for right arm cellulitis. Hx of benzodiazapine abuse, opioid abuse. Noted significant wt loss of 15% x 3 weeks. Reports no appetite here. When asked about his PO intake at home pt states it is \"fine\". Unable to obtain much nutrition hx from the pt r/t pt not being as cooperative. States that his wt loss were r/t fluid loss. Noted mild-moderate muscle wasting and fat wasting. Predicted inadequate oral intake at home r/t substance abuse. Will continue to follow. Will order boost to help w/ Po intake. Labs and meds reviewed.     Recs:  Boost plus BID (B/D)  Monitor and Encourage PO intake    RD to follow    CLINICAL NUTRITION ASSESSMENT      Reason for Assessment BMI     Diagnosis/Problem   Right arm cellulitis   Medical/Surgical History Past Medical History:   Diagnosis Date    Anemia     Arthritis     CHF (congestive heart failure)     Renal disorder     Vitamin D deficiency        Past Surgical History:   Procedure Laterality Date    CHOLECYSTECTOMY  10/13/2009    10/13/2009--laparoscopic cholecystectomy.    FRACTURE SURGERY      KNEE ACL RECONSTRUCTION      1996 and 1999--anterior cruciate ligament reconstruction right knee.        Anthropometrics        Current Height  Current Weight  BMI kg/m2 Height: 182.9 cm (72\")  Weight: 59 kg (130 lb) (05/05/24 1205)  Body mass index is 17.63 kg/m².   Adjusted BMI (if applicable)    BMI Category Underweight (18.4 or below)   Ideal Body Weight (IBW) 171#   Usual Body Weight (UBW) 150s#   Weight Trend Loss   Weight History Wt Readings from Last 30 Encounters:   05/05/24 1205 59 kg (130 lb)   04/15/24 0700 69 kg (152 lb 1.9 oz)   04/11/24 1815 73.9 kg (162 lb 14.7 oz)   04/11/24 1628 68 kg (150 lb)   01/10/24 1058 68.5 kg (151 lb)   01/08/24 2105 " "68.8 kg (151 lb 11.2 oz)   24 1519 59 kg (130 lb)   10/19/23 1858 5.897 kg (13 lb)   10/10/22 1124 67.2 kg (148 lb 2.4 oz)   14 1150 67.2 kg (148 lb 2 oz)   10/25/14 0812 63.5 kg (139 lb 15.9 oz)   10/11/14 1128 61.7 kg (136 lb)   10/02/14 1234 63 kg (139 lb)        Estimated Requirements         Weight used  59 kg    Calories  1475 - 1770 kcal (25-30 kcal/kg)    Protein  71 - 89 g pro (1.2 - 1.5 gm/kg)    Fluid   (1 mL/kcal)     Labs       Pertinent Labs    Results from last 7 days   Lab Units 24  0440 24  1252   SODIUM mmol/L 139 138   POTASSIUM mmol/L 4.2 4.0   CHLORIDE mmol/L 108* 105   CO2 mmol/L 22.8 23.6   BUN mg/dL 8 14   CREATININE mg/dL 1.04 1.33*   CALCIUM mg/dL 8.8 8.8   BILIRUBIN mg/dL  --  0.2   ALK PHOS U/L  --  99   ALT (SGPT) U/L  --  10   AST (SGOT) U/L  --  21   GLUCOSE mg/dL 108* 114*     Results from last 7 days   Lab Units 24  0440 24  1252   HEMOGLOBIN g/dL 9.2* 9.6*   HEMATOCRIT % 28.0* 29.5*   WBC 10*3/mm3 4.92 4.73   ALBUMIN g/dL  --  3.7     Results from last 7 days   Lab Units 24  0440 24  1252   PLATELETS 10*3/mm3 270 252     COVID19   Date Value Ref Range Status   2024 Not Detected Not Detected - Ref. Range Final     No results found for: \"HGBA1C\"       Medications           Scheduled Medications ALPRAZolam, 2 mg, Oral, TID   Followed by  [START ON 2024] ALPRAZolam, 1 mg, Oral, TID  cefTRIAXone, 2,000 mg, Intravenous, Q24H  gabapentin, 400 mg, Oral, 4x Daily  nicotine, 1 patch, Transdermal, Q24H  senna-docusate sodium, 2 tablet, Oral, BID  sodium chloride, 10 mL, Intravenous, Q12H  vancomycin, 1,500 mg, Intravenous, Q24H       Infusions Pharmacy to dose vancomycin,        PRN Medications   senna-docusate sodium **AND** polyethylene glycol **AND** bisacodyl **AND** bisacodyl    [] cloNIDine **FOLLOWED BY** cloNIDine **FOLLOWED BY** [START ON 2024] cloNIDine **FOLLOWED BY** [START ON 2024] cloNIDine **FOLLOWED BY** " [START ON 5/9/2024] cloNIDine    cyclobenzaprine    HYDROcodone-acetaminophen    HYDROmorphone **AND** naloxone    LORazepam **OR** midazolam **OR** LORazepam **OR** midazolam **OR** midazolam **OR** midazolam    Magnesium Standard Dose Replacement - Follow Nurse / BPA Driven Protocol    nicotine polacrilex    ondansetron ODT **OR** ondansetron    Pharmacy to dose vancomycin    [COMPLETED] Insert Peripheral IV **AND** sodium chloride    sodium chloride    sodium chloride     Physical Findings          General Findings alert, oriented, room air   Oral/Mouth Cavity WDL   Edema  upper extremity, 4+ (severe)   Gastrointestinal WDL   Skin  cellulitis   Tubes/Drains/Lines none   NFPE Other: Only able to assess UE. Noted moderate muscle and fat wasting. Pt uncooperative.      --  Current Nutrition Orders & Evaluation of Intake       Oral Nutrition     Food Allergies NKFA   Current PO Diet Diet: Regular/House; Fluid Consistency: Thin (IDDSI 0)   Supplement n/a   PO Evaluation     % PO Intake Not documented    Factors Affecting Intake: decreased appetite, pain issues, social/economic status, Other: drug abuse   --  PES STATEMENT / NUTRITION DIAGNOSIS      Nutrition Dx Problem  Problem: Predicted Suboptimal Intake  Etiology: Medical Diagnosis - cellulitis and Factors Affecting Nutrition - opioid abuse    Signs/Symptoms: Report of Minimal PO Intake, NFPE Results, BMI, and Unintended Weight Change     NUTRITION INTERVENTION / PLAN OF CARE      Intervention Goal(s) Establish goals of care, Meet estimated needs, Disease management/therapy, Increase intake, Accepts oral nutrition supplement, Appropriate weight gain, and PO intake goal %: 75%         RD Intervention/Action Encourage intake, Continue to monitor, Care plan reviewed, and Recommend/order: boost plus   --      Prescription/Orders:       PO Diet       Supplements Boost plus BID      Enteral Nutrition       Parenteral Nutrition    New Prescription Ordered? Yes   --       Monitor/Evaluation Per protocol, PO intake, Supplement intake, Weight, Skin status   Discharge Plan/Needs Pending clinical course   --    RD to follow per protocol.      Electronically signed by:  Taniya Craig  05/06/24 16:12 EDT

## 2024-05-06 NOTE — CASE MANAGEMENT/SOCIAL WORK
Discharge Planning Assessment  [facility]     Patient Name: Ryan Marina  MRN: [id number]  Today's Date: 5/6/2024    Admit Date: 5/5/2024    Plan: Home, may need assistance with transportation   Discharge Needs Assessment       Row Name 05/06/24 1523       Living Environment    People in Home parent(s)    Name(s) of People in Home Kali Marina [phone]    Current Living Arrangements home    Potentially Unsafe Housing Conditions none    In the past 12 months has the electric, gas, oil, or water company threatened to shut off services in your home? No    Primary Care Provided by self    Provides Primary Care For no one    Family Caregiver if Needed none    Quality of Family Relationships unable to assess    Able to Return to Prior Arrangements yes       Resource/Environmental Concerns    Resource/Environmental Concerns none    Transportation Concerns no car       Transportation Needs    In the past 12 months, has lack of transportation kept you from medical appointments or from getting medications? yes    In the past 12 months, has lack of transportation kept you from meetings, work, or from getting things needed for daily living? Yes       Food Insecurity    Within the past 12 months, you worried that your food would run out before you got the money to buy more. Never true    Within the past 12 months, the food you bought just didn't last and you didn't have money to get more. Never true       Transition Planning    Patient/Family Anticipates Transition to home    Patient/Family Anticipated Services at Transition none    Transportation Anticipated other (see comments)  May need assistance with transportation home       Discharge Needs Assessment    Readmission Within the Last 30 Days previous discharge plan unsuccessful    Equipment Currently Used at Home none    Concerns to be Addressed discharge planning    Anticipated Changes Related to Illness none    Equipment Needed After Discharge none                    "Discharge Plan       Row Name 05/06/24 1525       Plan    Plan Home, may need assistance with transportation    Patient/Family in Agreement with Plan yes    Provided Post Acute Provider List? N/A    Provided Post Acute Provider Quality & Resource List? N/A    Plan Comments Met with pt at bedside. Introduced self and explained role of . Face sheet verified, Pt does not have a PCP, refused CCP assistance in obtaining a PCP, refused clinic list. Pt stated that he lives with his parents but they are not able to assist with any care needs that may arise. Pt is independent in ADL's, does not use any assistive deivces. Pt refuses any information on substance abuse resources. When CCP discussed the possibilty of long term IVAB and the need to travel to the hospital daily, pt stated that would be a \"no go\" for him as he would not do that, and he would not have daily transportation.  Upon discharge, pt stated that he would need assistance with transportation home. Explained that CCP would follow to assess for discharge needs.                  Continued Care and Services - Admitted Since 5/5/2024    No active coordination exists for this encounter.       Expected Discharge Date and Time       Expected Discharge Date Expected Discharge Time    May 8, 2024            Demographic Summary    No documentation.                  Functional Status    No documentation.                  Psychosocial    No documentation.                  Abuse/Neglect    No documentation.                  Legal    No documentation.                  Substance Abuse    No documentation.                  Patient Forms    No documentation.                     Anahy Nixon RN    "

## 2024-05-06 NOTE — CASE MANAGEMENT/SOCIAL WORK
Case Management Readmission Assessment Note    Case Management Readmission Assessment (all recorded)       Readmission Interview       [facility] Name 05/06/24 1640             Readmission Indications    Is this hospitalization related to the prior hospital diagnosis? Yes      What was the reason you were admitted? Cellulitis of arm        [facility] Name 05/06/24 1640             Recommendation for rehospitalization    Did you speak with your physician prior to coming to the hospital No      Who recommended you return to the hospital? Other (comment)  Pt came in through the ER      Did you seek care elsewhere prior to coming to the hospital? No        Row Name 05/06/24 1640             Follow up appointment    Do you have a PCP? No      Did you have an appointment with PCP/specialist after hospitalization within 7 days? No      Did you keep your appointment? --  NA        [facility] Name 05/06/24 1640             Medications    Did you have newly prescribed medications at discharge? Yes      Did you understand the reasons for your medications at discharge and how to take them? Yes      Did you understand the side effects of your medications? Yes      Are you taking all of you prescribed medications? No      If not, why? Patient non-compliance        Row Name 05/06/24 1640             Discharge Instructions    Did you understand your discharge instructions? Yes      Did your family/caregiver hear your instructions? No      Were you told to eat a special diet? No      Did you adhere to the diet? No      Were you given a number of someone to call if you had questions or concerns? Yes        Row Name 05/06/24 1640             Index discharge location/services    Where did you go upon discharge? Home      Do you have supportive family or friends in the home? No        Row Name 05/06/24 1640             Discharge Readiness    On a scale of 1-5 (5 being well prepared), how ready were you for discharge 3      Recommendation based on interview  Education on diagnosis/self management;Other (comment)  Pt is non compliant, refused substance abuse resources

## 2024-05-06 NOTE — PROGRESS NOTES
"[facility] Clinical Pharmacy Services: Vancomycin Pharmacokinetic Initial Consult Note    Ryan Marina is a 43 y.o. male who is on day 2 of pharmacy to dose vancomycin.    Indication: Skin and Soft Tissue  Consulting Provider: Timothy  Planned Duration of Therapy: 7  Loading Dose Ordered or Given: 1250 mg on 5/5 at 1500  MRSA PCR performed: no  Culture/Source:   5/5 BloodCx - pending x2  Target: Dose by Levels  Pertinent Vanc Dosing History: n/a  Other Antimicrobials: Zosyn    Vitals/Labs  Ht: 182.9 cm (72\"); Wt: 59 kg (130 lb)  Temp Readings from Last 1 Encounters:   05/05/24 99.5 °F (37.5 °C) (Oral)    Estimated Creatinine Clearance: 76.4 mL/min (by C-G formula based on SCr of 1.04 mg/dL).  MAUREEN     Results from last 7 days   Lab Units 05/06/24  0440 05/05/24  1252   CREATININE mg/dL 1.04 1.33*   WBC 10*3/mm3 4.92 4.73     Assessment/Plan:    Vancomycin Dose: Renal status improved. Will schedule for 1500 mg IV Q24h. Vanc trough on 5/8 at 0800.     Pharmacy will follow patient's kidney function and will adjust doses and obtain levels as necessary. Thank you for involving pharmacy in this patient's care. Please contact pharmacy with any questions or concerns.                           [facility]  Clinical Pharmacist      "

## 2024-05-06 NOTE — PROGRESS NOTES
"    Name: Ryan Marina ADMIT: 2024   : 1980  PCP: Provider, No Known    MRN: [id number] LOS: 1 days   AGE/SEX: 43 y.o. male  ROOM: [facility]/     Subjective   Subjective   He is complaining of bilateral arm pain R> L. He is having \"severe\" withdrawal symptoms including anxiety, muscle aches and tension, and restlessness. Nausea but no emesis. He is requesting gabapentin which has previously helped his withdrawal episodes       Objective   Objective   Vital Signs  Temp:  [97.2 °F (36.2 °C)-99.5 °F (37.5 °C)] 98.4 °F (36.9 °C)  Heart Rate:  [] 90  Resp:  [14-18] 18  BP: ()/(45-75) 102/68  SpO2:  [94 %-100 %] 99 %  on   ;   Device (Oxygen Therapy): room air  Body mass index is 17.63 kg/m².  Physical Exam  Vitals reviewed.   Constitutional:       Appearance: He is well-developed. He is ill-appearing.      Comments: Uncomfortable in bed with withdrawal symptoms    HENT:      Head: Normocephalic and atraumatic.   Cardiovascular:      Rate and Rhythm: Regular rhythm. Tachycardia present.   Pulmonary:      Effort: Pulmonary effort is normal. No respiratory distress.      Breath sounds: Normal breath sounds.   Abdominal:      General: Bowel sounds are normal. There is no distension.      Palpations: Abdomen is soft.      Tenderness: There is no abdominal tenderness.   Skin:     Comments: BUE forearm lesions, erythema, warmth edema and tenderness   Neurological:      Mental Status: He is alert and oriented to person, place, and time.   Psychiatric:         Behavior: Behavior normal.         Thought Content: Thought content normal.         Judgment: Judgment normal.      Comments: Restless anxious       Results Review     I reviewed the patient's new clinical results.  Results from last 7 days   Lab Units 24  0440 24  1252   WBC 10*3/mm3 4.92 4.73   HEMOGLOBIN g/dL 9.2* 9.6*   PLATELETS 10*3/mm3 270 252     Results from last 7 days   Lab Units 24  0440 24  1252   SODIUM mmol/L 139 " "138   POTASSIUM mmol/L 4.2 4.0   CHLORIDE mmol/L 108* 105   CO2 mmol/L 22.8 23.6   BUN mg/dL 8 14   CREATININE mg/dL 1.04 1.33*   GLUCOSE mg/dL 108* 114*   EGFR mL/min/1.73 91.4 68.0     Results from last 7 days   Lab Units 05/05/24  1252   ALBUMIN g/dL 3.7   BILIRUBIN mg/dL 0.2   ALK PHOS U/L 99   AST (SGOT) U/L 21   ALT (SGPT) U/L 10     Results from last 7 days   Lab Units 05/06/24  0440 05/05/24  1252   CALCIUM mg/dL 8.8 8.8   ALBUMIN g/dL  --  3.7     Results from last 7 days   Lab Units 05/05/24  1322 05/05/24  1252   PROCALCITONIN ng/mL  --  4.60*   LACTATE mmol/L 1.8  --      No results found for: \"HGBA1C\", \"POCGLU\"    No radiology results for the last day    I have personally reviewed all medications:  Scheduled Medications  ALPRAZolam, 2 mg, Oral, TID   Followed by  [START ON 5/7/2024] ALPRAZolam, 1 mg, Oral, TID  cefTRIAXone, 2,000 mg, Intravenous, Q24H  nicotine, 1 patch, Transdermal, Q24H  senna-docusate sodium, 2 tablet, Oral, BID  sodium chloride, 10 mL, Intravenous, Q12H  vancomycin, 1,500 mg, Intravenous, Q24H    Infusions  Pharmacy to dose vancomycin,     Diet  Diet: Regular/House; Fluid Consistency: Thin (IDDSI 0)    I have personally reviewed:  [x]  Laboratory   [x]  Microbiology   [x]  Radiology   [x]  EKG/Telemetry  [x]  Cardiology/Vascular   []  Pathology    []  Records       Assessment/Plan     Active Hospital Problems    Diagnosis  POA    **Right arm cellulitis [L03.113]  Yes    Cellulitis of arm, right [L03.113]  Yes    Opioid use disorder, severe, dependence [F11.20]  Yes    Benzodiazepine abuse [F13.10]  Yes    CKD (chronic kidney disease) stage 2, GFR 60-89 ml/min [N18.2]  Yes    History of Depression with anxiety [F41.8]  Yes      Resolved Hospital Problems   No resolved problems to display.       43 y.o. male admitted with Left and  Right arm cellulitis.      Bilateral arm cellulitis from heroin injection  --Previously seen by surgery and ID here last admission last month for " same  -Blood cultures pending  -ID appreciated. Vancomycin IV, stop zosyn and start IV ceftriaxone.   - Ortho/hand surgery consult pending. States he previously attempted to go to [facility], but reports they would not take his insurance  -continue Pain control     Benzo abuse with patient reported history of withdrawal seizures  Opioid use disorder  -Patient reports 20-30 2mg Xanax a day  -Discussed with pharmacy, Plan Xanax 2mg TID scheduled for 2 days and then Xanax 1mg for 2 days. CIWA protocol in addition to scheduled Xanax.  - add scheduled gabapentin for symptoms and prn flexeril.   -Clonidine detox order set     C3 nephropathy  CKD stage 2  -Cr near baseline, continue to monitor     Nicotine use disorder-nicotine patch.        I discussed the patient's findings and my recommendations with patient and ED provider.     VTE Prophylaxis - SCDs.  Code Status - Full code.     Yesenia WILLSON  [facility]  05/06/24  09:06 EDT

## 2024-05-06 NOTE — CONSULTS
"Referring Provider: Ryan Davis MD  Reason for Consultation:     bilateral arm cellulitis     Chief Complaint   Patient presents with    Wound Check         Subjective   History of present illness: Patient is a 43-year-old male with past medical history of injection drug abuse with opiates and bilateral upper extremity wounds who presents with worsening bilateral upper extremity erythema and swelling.  ID consulted for \"bilateral arm cellulitis\".    Patient reports that his main complaint today is his withdrawal.  States he feels terrible and will soon be leaving the hospital to self medicate for his withdrawal.  States he has had pain and swelling in the upper extremities.  States they have been red and he has seen some drainage off and on.    On presentation patient is afebrile with no leukocytosis.  Procalcitonin elevated at 4.6.  CRP elevated at 2.9.  Initially started on vancomycin and Zosyn.  Also found to have MAUREEN.    Past Medical History:   Diagnosis Date    Anemia     Arthritis     CHF (congestive heart failure)     Renal disorder     Vitamin D deficiency        Past Surgical History:   Procedure Laterality Date    CHOLECYSTECTOMY  10/13/2009    10/13/2009--laparoscopic cholecystectomy.    FRACTURE SURGERY      KNEE ACL RECONSTRUCTION      1996 and 1999--anterior cruciate ligament reconstruction right knee.       family history includes Diabetes in his maternal grandmother and mother; Hyperthyroidism in his father.     reports that he has been smoking cigarettes. He does not have any smokeless tobacco history on file. He reports current drug use. Frequency: 7.00 times per week. Drug: Heroin. He reports that he does not drink alcohol.     No Known Allergies    Medication:  Antibiotics:  Anti-Infectives (From admission, onward)      Ordered     Dose/Rate Route Frequency Start Stop    05/06/24 0806  vancomycin IVPB 1500 mg in 0.9% NaCl (Premix) 500 mL        Ordering Provider: Osiel Cruz MD "    1,500 mg  333.3 mL/hr over 90 Minutes Intravenous Every 24 Hours 05/06/24 0900 05/11/24 0859    05/06/24 0739  cefTRIAXone (ROCEPHIN) 2,000 mg in sodium chloride 0.9 % 100 mL MBP        Ordering Provider: Nigel Castillo,     2,000 mg  200 mL/hr over 30 Minutes Intravenous Every 24 Hours 05/06/24 0830 05/11/24 0829    05/05/24 1724  Pharmacy to dose vancomycin        Ordering Provider: Kei Aguirre MD     Does not apply Continuous PRN 05/05/24 1724 05/10/24 1723    05/05/24 1332  Vancomycin HCl 1,250 mg in sodium chloride 0.9 % 250 mL VTB        Ordering Provider: Ryan Davis MD    20 mg/kg × 59 kg  200 mL/hr over 75 Minutes Intravenous Once 05/05/24 1348 05/05/24 1615    05/05/24 1332  piperacillin-tazobactam (ZOSYN) 3.375 g IVPB in 100 mL NS MBP (CD)        Ordering Provider: Ryan Davis MD    3.375 g  over 30 Minutes Intravenous Once 05/05/24 1348 05/05/24 1456              Objective     Physical Exam:   Vital Signs   Temp:  [97.2 °F (36.2 °C)-99.5 °F (37.5 °C)] 98.4 °F (36.9 °C)  Heart Rate:  [] 90  Resp:  [14-18] 18  BP: ()/(45-75) 102/68    GENERAL: Awake and alert, in no acute distress.   HEENT: Oropharynx is clear. Hearing is grossly normal.   LUNGS: Normal work of breathing  SKIN: Bilateral upper extremity erythema and swelling with numerous lesions in multiple states of healing.      Results Review:   I reviewed the patient's new clinical results.  I reviewed the patient's new imaging results and agree with the interpretation.    Lab Results   Component Value Date    WBC 4.92 05/06/2024    HGB 9.2 (L) 05/06/2024    HCT 28.0 (L) 05/06/2024    MCV 83.1 05/06/2024     05/06/2024       Lab Results   Component Value Date    VANCOTROUGH 13.90 04/14/2024    VANCORANDOM 8.40 05/06/2024       Lab Results   Component Value Date    GLUCOSE 108 (H) 05/06/2024    BUN 8 05/06/2024    CREATININE 1.04 05/06/2024    EGFRIFAFRI 51 (L) 01/22/2023    BCR 7.7 05/06/2024    CO2 22.8  05/06/2024    CALCIUM 8.8 05/06/2024    ALBUMIN 3.7 05/05/2024    LABIL2 0.9 (L) 01/22/2023    AST 21 05/05/2024    ALT 10 05/05/2024         Estimated Creatinine Clearance: 76.4 mL/min (by C-G formula based on SCr of 1.04 mg/dL).      Microbiology:  5/5 blood cultures in process    Radiology:  5/1 bilateral upper extremity x-rays with no osseous abnormalities.  Irregular ulcerations.    Assessment     #Bilateral upper extremity cellulitis  #IVDU  #Opioid withdrawal  #Opioid abuse  #Benzodiazepine abuse  #MAUREEN on CKD    Blood cultures pending.  Continue vancomycin goal -600.  Stop Zosyn and start ceftriaxone 2 g daily.  Agree with hand surgery consult.  Follow vancomycin level for therapeutic drug monitoring.      Thank you for this consult.  We will continue to follow along and tailor antibiotics as the patient's clinical course evolves.

## 2024-05-06 NOTE — PLAN OF CARE
Goal Outcome Evaluation:  Plan of Care Reviewed With: patient        Progress: no change  Outcome Evaluation: VSS; complaints of generalized pain; pain medication given; CIWAs and COWs done; clonidine given x1; xanax scheduled; pt has been sleeping off and on today; safety maintained

## 2024-05-06 NOTE — CONSULTS
"[facility] attempted consult with patient regarding alcohol and drug use. Last COWS score of 13, CIWA of 4. Pt well known to the [facility] for substance use and was last seen in consult 4/12/24. Pt also seen in consult with psychiatrist 4/14/24. When seen this evening pt resting in bed but irritable on approach. Voiced purpose of visit and pt just stated \"no\". Asked if there was anything beyond substance use we may be able to assist with to which he voiced no again. Asked if he would prefer [facility] return at a later time to which he stated no and that he did not want anything from us. He denies SI at this time. Access will sign off per pt's wishes and will not follow per pt's wishes. Encouraged to let staff know if he changes his mind.   "

## 2024-05-06 NOTE — PLAN OF CARE
Problem: Adult Inpatient Plan of Care  Goal: Plan of Care Review  5/6/2024 0525 by Kate Zacarias, Nursing Student  Outcome: Ongoing, Progressing  Flowsheets (Taken 5/6/2024 0525)  Plan of Care Reviewed With: patient (Pended)  Outcome Evaluation: Patient admitted  5/5 from the ER for arm cellulitis. Pt asking for meds to prevent detox, COWS and CIWA scores noted, medicated per MAR,protocol in place. Pt appeared to be sleeping with unlabored and easy respirations throughout the shift and has been awakened for each assessment. Pt has not used the call bell to notify staff of any C/O's of pain or discomfort. R and L forearm riddened with multiple scabs, no drainage noted,  no seizures noted. IV antibiotics continued. Safety maintained. (Pended)  5/6/2024 0789 by Kate Zacarias, Nursing Student                                              crying spells

## 2024-05-07 LAB
ANION GAP SERPL CALCULATED.3IONS-SCNC: 9.6 MMOL/L (ref 5–15)
BUN SERPL-MCNC: 10 MG/DL (ref 6–20)
BUN/CREAT SERPL: 11.9 (ref 7–25)
CALCIUM SPEC-SCNC: 9.1 MG/DL (ref 8.6–10.5)
CHLORIDE SERPL-SCNC: 109 MMOL/L (ref 98–107)
CO2 SERPL-SCNC: 23.4 MMOL/L (ref 22–29)
CREAT SERPL-MCNC: 0.84 MG/DL (ref 0.76–1.27)
DEPRECATED RDW RBC AUTO: 43.8 FL (ref 37–54)
EGFRCR SERPLBLD CKD-EPI 2021: 111 ML/MIN/1.73
ERYTHROCYTE [DISTWIDTH] IN BLOOD BY AUTOMATED COUNT: 14.3 % (ref 12.3–15.4)
FERRITIN SERPL-MCNC: 80.5 NG/ML (ref 30–400)
GLUCOSE SERPL-MCNC: 90 MG/DL (ref 65–99)
HCT VFR BLD AUTO: 27.4 % (ref 37.5–51)
HGB BLD-MCNC: 9 G/DL (ref 13–17.7)
IRON 24H UR-MRATE: 54 MCG/DL (ref 59–158)
IRON SATN MFR SERPL: 18 % (ref 20–50)
MCH RBC QN AUTO: 28 PG (ref 26.6–33)
MCHC RBC AUTO-ENTMCNC: 32.8 G/DL (ref 31.5–35.7)
MCV RBC AUTO: 85.1 FL (ref 79–97)
PLATELET # BLD AUTO: 266 10*3/MM3 (ref 140–450)
PMV BLD AUTO: 9.4 FL (ref 6–12)
POTASSIUM SERPL-SCNC: 3.7 MMOL/L (ref 3.5–5.2)
RBC # BLD AUTO: 3.22 10*6/MM3 (ref 4.14–5.8)
SODIUM SERPL-SCNC: 142 MMOL/L (ref 136–145)
TIBC SERPL-MCNC: 305 MCG/DL (ref 298–536)
TRANSFERRIN SERPL-MCNC: 205 MG/DL (ref 200–360)
WBC NRBC COR # BLD AUTO: 4.51 10*3/MM3 (ref 3.4–10.8)

## 2024-05-07 PROCEDURE — 25010000002 VANCOMYCIN 10 G RECONSTITUTED SOLUTION: Performed by: INTERNAL MEDICINE

## 2024-05-07 PROCEDURE — 25010000002 CEFTRIAXONE PER 250 MG: Performed by: STUDENT IN AN ORGANIZED HEALTH CARE EDUCATION/TRAINING PROGRAM

## 2024-05-07 PROCEDURE — 99232 SBSQ HOSP IP/OBS MODERATE 35: CPT | Performed by: STUDENT IN AN ORGANIZED HEALTH CARE EDUCATION/TRAINING PROGRAM

## 2024-05-07 PROCEDURE — 25810000003 SODIUM CHLORIDE 0.9 % SOLUTION: Performed by: INTERNAL MEDICINE

## 2024-05-07 PROCEDURE — 83540 ASSAY OF IRON: CPT | Performed by: NURSE PRACTITIONER

## 2024-05-07 PROCEDURE — 82728 ASSAY OF FERRITIN: CPT | Performed by: NURSE PRACTITIONER

## 2024-05-07 PROCEDURE — 84466 ASSAY OF TRANSFERRIN: CPT | Performed by: NURSE PRACTITIONER

## 2024-05-07 PROCEDURE — 85027 COMPLETE CBC AUTOMATED: CPT | Performed by: NURSE PRACTITIONER

## 2024-05-07 PROCEDURE — 25010000002 HYDROMORPHONE PER 4 MG: Performed by: STUDENT IN AN ORGANIZED HEALTH CARE EDUCATION/TRAINING PROGRAM

## 2024-05-07 PROCEDURE — 80048 BASIC METABOLIC PNL TOTAL CA: CPT | Performed by: NURSE PRACTITIONER

## 2024-05-07 RX ADMIN — ALPRAZOLAM 1 MG: 1 TABLET ORAL at 20:10

## 2024-05-07 RX ADMIN — ALPRAZOLAM 2 MG: 1 TABLET ORAL at 15:35

## 2024-05-07 RX ADMIN — HYDROMORPHONE HYDROCHLORIDE 0.5 MG: 1 INJECTION, SOLUTION INTRAMUSCULAR; INTRAVENOUS; SUBCUTANEOUS at 20:10

## 2024-05-07 RX ADMIN — Medication 10 ML: at 00:07

## 2024-05-07 RX ADMIN — HYDROMORPHONE HYDROCHLORIDE 0.5 MG: 1 INJECTION, SOLUTION INTRAMUSCULAR; INTRAVENOUS; SUBCUTANEOUS at 12:15

## 2024-05-07 RX ADMIN — NICOTINE 1 PATCH: 21 PATCH, EXTENDED RELEASE TRANSDERMAL at 15:36

## 2024-05-07 RX ADMIN — Medication 10 ML: at 20:11

## 2024-05-07 RX ADMIN — Medication 10 ML: at 08:38

## 2024-05-07 RX ADMIN — HYDROCODONE BITARTRATE AND ACETAMINOPHEN 1 TABLET: 5; 325 TABLET ORAL at 17:07

## 2024-05-07 RX ADMIN — GABAPENTIN 400 MG: 400 CAPSULE ORAL at 20:10

## 2024-05-07 RX ADMIN — GABAPENTIN 400 MG: 400 CAPSULE ORAL at 08:37

## 2024-05-07 RX ADMIN — GABAPENTIN 400 MG: 400 CAPSULE ORAL at 17:07

## 2024-05-07 RX ADMIN — HYDROMORPHONE HYDROCHLORIDE 0.5 MG: 1 INJECTION, SOLUTION INTRAMUSCULAR; INTRAVENOUS; SUBCUTANEOUS at 15:35

## 2024-05-07 RX ADMIN — GABAPENTIN 400 MG: 400 CAPSULE ORAL at 12:15

## 2024-05-07 RX ADMIN — HYDROMORPHONE HYDROCHLORIDE 0.5 MG: 1 INJECTION, SOLUTION INTRAMUSCULAR; INTRAVENOUS; SUBCUTANEOUS at 00:04

## 2024-05-07 RX ADMIN — CEFTRIAXONE 2000 MG: 2 INJECTION, POWDER, FOR SOLUTION INTRAMUSCULAR; INTRAVENOUS at 08:37

## 2024-05-07 RX ADMIN — CYCLOBENZAPRINE 10 MG: 10 TABLET, FILM COATED ORAL at 20:10

## 2024-05-07 RX ADMIN — VANCOMYCIN HYDROCHLORIDE 1500 MG: 10 INJECTION, POWDER, LYOPHILIZED, FOR SOLUTION INTRAVENOUS at 08:38

## 2024-05-07 RX ADMIN — CYCLOBENZAPRINE 10 MG: 10 TABLET, FILM COATED ORAL at 10:31

## 2024-05-07 RX ADMIN — HYDROMORPHONE HYDROCHLORIDE 0.5 MG: 1 INJECTION, SOLUTION INTRAMUSCULAR; INTRAVENOUS; SUBCUTANEOUS at 08:38

## 2024-05-07 RX ADMIN — HYDROCODONE BITARTRATE AND ACETAMINOPHEN 1 TABLET: 5; 325 TABLET ORAL at 10:31

## 2024-05-07 RX ADMIN — ALPRAZOLAM 2 MG: 1 TABLET ORAL at 08:37

## 2024-05-07 NOTE — PROGRESS NOTES
LOS: 2 days     Chief Complaint: Cellulitis    Interval History: Patient reports feeling better this morning with less withdrawal symptoms.  Reports pain in bilateral arms.    Vital Signs  Temp:  [97.5 °F (36.4 °C)-98.2 °F (36.8 °C)] 97.5 °F (36.4 °C)  Heart Rate:  [69-89] 69  Resp:  [16-18] 16  BP: ()/(58-82) 113/69    Physical Exam:  General: In no acute distress  HEENT: Oropharynx clear, moist mucous membranes  Respiratory: Normal work of breathing  Extremities: Bilateral upper extremity edema and erythema with numerous lesions.  Access: Peripheral IV    Antibiotics:  Anti-Infectives (From admission, onward)      Ordered     Dose/Rate Route Frequency Start Stop    05/06/24 0806  vancomycin IVPB 1500 mg in 0.9% NaCl (Premix) 500 mL        Ordering Provider: Osiel Cruz MD    1,500 mg  333.3 mL/hr over 90 Minutes Intravenous Every 24 Hours 05/06/24 0900 05/11/24 0859    05/06/24 0739  cefTRIAXone (ROCEPHIN) 2,000 mg in sodium chloride 0.9 % 100 mL MBP        Ordering Provider: Nigel Castillo DO    2,000 mg  200 mL/hr over 30 Minutes Intravenous Every 24 Hours 05/06/24 0830 05/11/24 0829    05/05/24 1724  Pharmacy to dose vancomycin        Ordering Provider: Kei Aguirre MD     Does not apply Continuous PRN 05/05/24 1724 05/10/24 1723    05/05/24 1332  Vancomycin HCl 1,250 mg in sodium chloride 0.9 % 250 mL VTB        Ordering Provider: Ryan Davis MD    20 mg/kg × 59 kg  200 mL/hr over 75 Minutes Intravenous Once 05/05/24 1348 05/05/24 1615    05/05/24 1332  piperacillin-tazobactam (ZOSYN) 3.375 g IVPB in 100 mL NS MBP (CD)        Ordering Provider: Ryan Davis MD    3.375 g  over 30 Minutes Intravenous Once 05/05/24 1348 05/05/24 1456             Results Review:     I reviewed the patient's new clinical results.    Lab Results   Component Value Date    WBC 4.51 05/07/2024    HGB 9.0 (L) 05/07/2024    HCT 27.4 (L) 05/07/2024    MCV 85.1 05/07/2024     05/07/2024      Lab Results   Component Value Date    GLUCOSE 90 05/07/2024    BUN 10 05/07/2024    CREATININE 0.84 05/07/2024    EGFRIFAFRI 51 (L) 01/22/2023    BCR 11.9 05/07/2024    CO2 23.4 05/07/2024    CALCIUM 9.1 05/07/2024    ALBUMIN 3.7 05/05/2024    LABIL2 0.9 (L) 01/22/2023    AST 21 05/05/2024    ALT 10 05/05/2024       Microbiology:  5/5 blood cultures in process    Assessment    #Bilateral upper extremity cellulitis  #IVDU  #Opioid withdrawal  #Opioid abuse  #Benzodiazepine abuse  #MAUREEN on CKD     Erythema improving.  Remains afebrile with no leukocytosis.  Continue ceftriaxone 2 g daily and vancomycin goal -600.  Follow-up blood cultures.      ID will follow.

## 2024-05-07 NOTE — PLAN OF CARE
"Goal Outcome Evaluation:  Plan of Care Reviewed With: patient        Progress: no change  Outcome Evaluation: Pt has been sleeping most of the shift. Up to toilet with stand by assist. VSS. Pt c/o pain everywhere from \"many broken bones\" and withdrawal. CIWA score 6, COWs 2. Pt BC pending. Pt states that dilaudid helps him to sleep. Pt also states that he has not gotten anything to eat since he has been here, offered patient snacks that were available, pt declined. CTM, safety maintained.                               "

## 2024-05-07 NOTE — PROGRESS NOTES
Name: Ryan Marina ADMIT: 2024   : 1980  PCP: Provider, No Known    MRN: [id number] LOS: 2 days   AGE/SEX: 43 y.o. male  ROOM: [facility]/     Subjective   Subjective   He is complaining of bilateral arm pain. Still having withdrawal symptoms including anxiety, muscle aches and tension, and restlessness but overall symptoms have lessened. No CP SOB       Objective   Objective   Vital Signs  Temp:  [97.5 °F (36.4 °C)-98.2 °F (36.8 °C)] 97.5 °F (36.4 °C)  Heart Rate:  [69-89] 69  Resp:  [16-18] 16  BP: ()/(58-82) 113/69  SpO2:  [97 %-100 %] 100 %  on   ;   Device (Oxygen Therapy): room air  Body mass index is 17.63 kg/m².  Physical Exam  Vitals reviewed.   Constitutional:       General: He is not in acute distress.     Appearance: He is well-developed. He is ill-appearing. He is not toxic-appearing.      Comments: Sitting up in bed listening to music.    HENT:      Head: Normocephalic and atraumatic.   Cardiovascular:      Rate and Rhythm: Normal rate and regular rhythm.   Pulmonary:      Effort: Pulmonary effort is normal. No respiratory distress.      Breath sounds: Normal breath sounds. No wheezing.   Abdominal:      General: Bowel sounds are normal. There is no distension.      Palpations: Abdomen is soft.      Tenderness: There is no abdominal tenderness.   Skin:     Comments: BUE forearm lesions, erythema, warmth edema and tenderness   Neurological:      General: No focal deficit present.      Mental Status: He is alert and oriented to person, place, and time. Mental status is at baseline.   Psychiatric:         Behavior: Behavior normal.      Comments: Mild anxiety today       Results Review     I reviewed the patient's new clinical results.  Results from last 7 days   Lab Units 24  0345 24  0440 24  1252   WBC 10*3/mm3 4.51 4.92 4.73   HEMOGLOBIN g/dL 9.0* 9.2* 9.6*   PLATELETS 10*3/mm3 266 270 252     Results from last 7 days   Lab Units 24  0345 24  0440  "05/05/24  1252   SODIUM mmol/L 142 139 138   POTASSIUM mmol/L 3.7 4.2 4.0   CHLORIDE mmol/L 109* 108* 105   CO2 mmol/L 23.4 22.8 23.6   BUN mg/dL 10 8 14   CREATININE mg/dL 0.84 1.04 1.33*   GLUCOSE mg/dL 90 108* 114*   EGFR mL/min/1.73 111.0 91.4 68.0     Results from last 7 days   Lab Units 05/05/24  1252   ALBUMIN g/dL 3.7   BILIRUBIN mg/dL 0.2   ALK PHOS U/L 99   AST (SGOT) U/L 21   ALT (SGPT) U/L 10     Results from last 7 days   Lab Units 05/07/24  0345 05/06/24  0440 05/05/24  1252   CALCIUM mg/dL 9.1 8.8 8.8   ALBUMIN g/dL  --   --  3.7     Results from last 7 days   Lab Units 05/05/24  1322 05/05/24  1252   PROCALCITONIN ng/mL  --  4.60*   LACTATE mmol/L 1.8  --      No results found for: \"HGBA1C\", \"POCGLU\"    No radiology results for the last day    I have personally reviewed all medications:  Scheduled Medications  ALPRAZolam, 2 mg, Oral, TID   Followed by  ALPRAZolam, 1 mg, Oral, TID  cefTRIAXone, 2,000 mg, Intravenous, Q24H  gabapentin, 400 mg, Oral, 4x Daily  nicotine, 1 patch, Transdermal, Q24H  senna-docusate sodium, 2 tablet, Oral, BID  sodium chloride, 10 mL, Intravenous, Q12H  vancomycin, 1,500 mg, Intravenous, Q24H    Infusions  Pharmacy to dose vancomycin,     Diet  Diet: Regular/House; Fluid Consistency: Thin (IDDSI 0)    I have personally reviewed:  [x]  Laboratory   [x]  Microbiology   [x]  Radiology   [x]  EKG/Telemetry  [x]  Cardiology/Vascular   []  Pathology    []  Records       Assessment/Plan     Active Hospital Problems    Diagnosis  POA    **Right arm cellulitis [L03.113]  Yes    Cellulitis of arm, right [L03.113]  Yes    Opioid use disorder, severe, dependence [F11.20]  Yes    Benzodiazepine abuse [F13.10]  Yes    CKD (chronic kidney disease) stage 2, GFR 60-89 ml/min [N18.2]  Yes    Anemia, chronic disease [D63.8]  Yes    History of Depression with anxiety [F41.8]  Yes      Resolved Hospital Problems   No resolved problems to display.       43 y.o. male admitted with Left and  Right " arm cellulitis.      Bilateral arm cellulitis from heroin injection  --Previously seen by surgery and ID here last admission last month for same  -Blood cultures pending  -ID appreciated. Vancomycin IV continued.   IV ceftriaxone.   - Ortho/hand surgery doesn't recommend sx intervention.    -continue Pain control     Benzo abuse with patient reported history of withdrawal seizures  Opioid use disorder  -Patient reports 20-30 2mg Xanax a day  -Discussed with pharmacy, Plan Xanax 2mg TID scheduled for 2 days and now  Xanax 1mg for 2 days. CIWA protocol in addition to scheduled Xanax.  - scheduled gabapentin for symptoms and prn flexeril.   -Clonidine detox order set     C3 nephropathy  CKD stage 2  -Cr near baseline, continue to monitor     Chronic anemia - Check anemia labs.   Nicotine use disorder-nicotine patch.        I discussed the patient's findings and my recommendations with patient      VTE Prophylaxis - SCDs.  Code Status - Full code.     Yesenia WILLSON  [facility]  05/07/24  13:12 EDT

## 2024-05-08 LAB
FOLATE SERPL-MCNC: 11.5 NG/ML (ref 4.78–24.2)
VANCOMYCIN TROUGH SERPL-MCNC: 9.4 MCG/ML (ref 5–20)
VIT B12 BLD-MCNC: 279 PG/ML (ref 211–946)

## 2024-05-08 PROCEDURE — 25010000002 CEFTRIAXONE PER 250 MG: Performed by: STUDENT IN AN ORGANIZED HEALTH CARE EDUCATION/TRAINING PROGRAM

## 2024-05-08 PROCEDURE — 25010000002 HYDROMORPHONE PER 4 MG: Performed by: STUDENT IN AN ORGANIZED HEALTH CARE EDUCATION/TRAINING PROGRAM

## 2024-05-08 PROCEDURE — 99232 SBSQ HOSP IP/OBS MODERATE 35: CPT | Performed by: STUDENT IN AN ORGANIZED HEALTH CARE EDUCATION/TRAINING PROGRAM

## 2024-05-08 PROCEDURE — 25810000003 SODIUM CHLORIDE 0.9 % SOLUTION: Performed by: INTERNAL MEDICINE

## 2024-05-08 PROCEDURE — 82607 VITAMIN B-12: CPT | Performed by: NURSE PRACTITIONER

## 2024-05-08 PROCEDURE — 80202 ASSAY OF VANCOMYCIN: CPT | Performed by: INTERNAL MEDICINE

## 2024-05-08 PROCEDURE — 25010000002 VANCOMYCIN 10 G RECONSTITUTED SOLUTION: Performed by: INTERNAL MEDICINE

## 2024-05-08 PROCEDURE — 82746 ASSAY OF FOLIC ACID SERUM: CPT | Performed by: NURSE PRACTITIONER

## 2024-05-08 RX ORDER — HYDROCODONE BITARTRATE AND ACETAMINOPHEN 5; 325 MG/1; MG/1
1 TABLET ORAL EVERY 6 HOURS PRN
Status: DISCONTINUED | OUTPATIENT
Start: 2024-05-08 | End: 2024-05-09 | Stop reason: HOSPADM

## 2024-05-08 RX ORDER — FERROUS SULFATE 325(65) MG
325 TABLET ORAL
Status: DISCONTINUED | OUTPATIENT
Start: 2024-05-08 | End: 2024-05-09 | Stop reason: HOSPADM

## 2024-05-08 RX ADMIN — HYDROMORPHONE HYDROCHLORIDE 0.5 MG: 1 INJECTION, SOLUTION INTRAMUSCULAR; INTRAVENOUS; SUBCUTANEOUS at 05:24

## 2024-05-08 RX ADMIN — HYDROCODONE BITARTRATE AND ACETAMINOPHEN 1 TABLET: 5; 325 TABLET ORAL at 23:32

## 2024-05-08 RX ADMIN — NICOTINE 1 PATCH: 21 PATCH, EXTENDED RELEASE TRANSDERMAL at 15:51

## 2024-05-08 RX ADMIN — HYDROMORPHONE HYDROCHLORIDE 0.5 MG: 1 INJECTION, SOLUTION INTRAMUSCULAR; INTRAVENOUS; SUBCUTANEOUS at 08:54

## 2024-05-08 RX ADMIN — ALPRAZOLAM 1 MG: 1 TABLET ORAL at 23:32

## 2024-05-08 RX ADMIN — HYDROCODONE BITARTRATE AND ACETAMINOPHEN 1 TABLET: 5; 325 TABLET ORAL at 15:51

## 2024-05-08 RX ADMIN — HYDROMORPHONE HYDROCHLORIDE 0.5 MG: 1 INJECTION, SOLUTION INTRAMUSCULAR; INTRAVENOUS; SUBCUTANEOUS at 12:13

## 2024-05-08 RX ADMIN — GABAPENTIN 400 MG: 400 CAPSULE ORAL at 23:32

## 2024-05-08 RX ADMIN — CYCLOBENZAPRINE 10 MG: 10 TABLET, FILM COATED ORAL at 23:32

## 2024-05-08 RX ADMIN — Medication 10 ML: at 08:46

## 2024-05-08 RX ADMIN — HYDROMORPHONE HYDROCHLORIDE 0.5 MG: 1 INJECTION, SOLUTION INTRAMUSCULAR; INTRAVENOUS; SUBCUTANEOUS at 00:00

## 2024-05-08 RX ADMIN — CEFTRIAXONE 2000 MG: 2 INJECTION, POWDER, FOR SOLUTION INTRAMUSCULAR; INTRAVENOUS at 08:46

## 2024-05-08 RX ADMIN — ALPRAZOLAM 1 MG: 1 TABLET ORAL at 08:45

## 2024-05-08 RX ADMIN — GABAPENTIN 400 MG: 400 CAPSULE ORAL at 12:13

## 2024-05-08 RX ADMIN — FERROUS SULFATE TAB 325 MG (65 MG ELEMENTAL FE) 325 MG: 325 (65 FE) TAB at 08:54

## 2024-05-08 RX ADMIN — GABAPENTIN 400 MG: 400 CAPSULE ORAL at 08:45

## 2024-05-08 RX ADMIN — Medication 10 ML: at 23:36

## 2024-05-08 RX ADMIN — VANCOMYCIN HYDROCHLORIDE 1500 MG: 10 INJECTION, POWDER, LYOPHILIZED, FOR SOLUTION INTRAVENOUS at 08:45

## 2024-05-08 RX ADMIN — GABAPENTIN 400 MG: 400 CAPSULE ORAL at 18:23

## 2024-05-08 RX ADMIN — ALPRAZOLAM 1 MG: 1 TABLET ORAL at 15:48

## 2024-05-08 RX ADMIN — HYDROCODONE BITARTRATE AND ACETAMINOPHEN 1 TABLET: 5; 325 TABLET ORAL at 06:05

## 2024-05-08 NOTE — PLAN OF CARE
Goal Outcome Evaluation:  Plan of Care Reviewed With: patient        Progress: improving  Outcome Evaluation: Pt is AOX4. VSS. Pt c/o BUE pain and generalized pain d/t withdrawal. Pt has been resting most the night. CIWA-6 and COWS- 1.Interactive at times. Dilaudid given x3 and norco x2 this shift. Set bed alarm after midnight made patient aware. CTM, safety maintained.

## 2024-05-08 NOTE — PROGRESS NOTES
Name: Ryan Marina ADMIT: 2024   : 1980  PCP: Provider, No Known    MRN: [id number] LOS: 3 days   AGE/SEX: 43 y.o. male  ROOM: [facility]/     Subjective   Subjective   He is complaining of bilateral arm pain, swelling however, overall it has improved since admission. His biggest concern is persistent   withdrawal symptoms that have decreased in severity but persist. He complains of including anxiety, muscle aches and tension, and restlessness   No CP SOB.        Objective   Objective   Vital Signs  Temp:  [97.5 °F (36.4 °C)-98.1 °F (36.7 °C)] 97.5 °F (36.4 °C)  Heart Rate:  [72-80] 72  Resp:  [16-18] 16  BP: (114-123)/(71-76) 123/74  SpO2:  [98 %-99 %] 98 %  on   ;   Device (Oxygen Therapy): room air  Body mass index is 17.63 kg/m².  Physical Exam  Vitals reviewed.   Constitutional:       General: He is not in acute distress.     Appearance: He is well-developed. He is ill-appearing (anxious and more uncomfortable today). He is not toxic-appearing.      Comments: Sitting up in bed listening to music.    HENT:      Head: Normocephalic and atraumatic.   Cardiovascular:      Rate and Rhythm: Normal rate and regular rhythm.   Pulmonary:      Effort: Pulmonary effort is normal. No respiratory distress.      Breath sounds: Normal breath sounds. No wheezing.   Abdominal:      General: Bowel sounds are normal. There is no distension.      Palpations: Abdomen is soft.      Tenderness: There is no abdominal tenderness.   Skin:     Comments: BUE forearm with lesions, but improving erythema, warmth edema and tenderness   Neurological:      General: No focal deficit present.      Mental Status: He is alert and oriented to person, place, and time. Mental status is at baseline.   Psychiatric:         Mood and Affect: Mood is anxious.         Behavior: Behavior normal.      Comments: Mild anxiety today       Results Review     I reviewed the patient's new clinical results.  Results from last 7 days   Lab Units  "05/07/24  0345 05/06/24  0440 05/05/24  1252   WBC 10*3/mm3 4.51 4.92 4.73   HEMOGLOBIN g/dL 9.0* 9.2* 9.6*   PLATELETS 10*3/mm3 266 270 252     Results from last 7 days   Lab Units 05/07/24  0345 05/06/24  0440 05/05/24  1252   SODIUM mmol/L 142 139 138   POTASSIUM mmol/L 3.7 4.2 4.0   CHLORIDE mmol/L 109* 108* 105   CO2 mmol/L 23.4 22.8 23.6   BUN mg/dL 10 8 14   CREATININE mg/dL 0.84 1.04 1.33*   GLUCOSE mg/dL 90 108* 114*   EGFR mL/min/1.73 111.0 91.4 68.0     Results from last 7 days   Lab Units 05/05/24  1252   ALBUMIN g/dL 3.7   BILIRUBIN mg/dL 0.2   ALK PHOS U/L 99   AST (SGOT) U/L 21   ALT (SGPT) U/L 10     Results from last 7 days   Lab Units 05/07/24  0345 05/06/24  0440 05/05/24  1252   CALCIUM mg/dL 9.1 8.8 8.8   ALBUMIN g/dL  --   --  3.7     Results from last 7 days   Lab Units 05/05/24  1322 05/05/24  1252   PROCALCITONIN ng/mL  --  4.60*   LACTATE mmol/L 1.8  --      No results found for: \"HGBA1C\", \"POCGLU\"    No radiology results for the last day    I have personally reviewed all medications:  Scheduled Medications  ALPRAZolam, 1 mg, Oral, TID  cefTRIAXone, 2,000 mg, Intravenous, Q24H  ferrous sulfate, 325 mg, Oral, Daily With Breakfast  gabapentin, 400 mg, Oral, 4x Daily  nicotine, 1 patch, Transdermal, Q24H  senna-docusate sodium, 2 tablet, Oral, BID  sodium chloride, 10 mL, Intravenous, Q12H  [START ON 5/9/2024] vancomycin, 1,750 mg, Intravenous, Q24H    Infusions  Pharmacy to dose vancomycin,     Diet  Diet: Regular/House; Fluid Consistency: Thin (IDDSI 0)    I have personally reviewed:  [x]  Laboratory   [x]  Microbiology   [x]  Radiology   [x]  EKG/Telemetry  [x]  Cardiology/Vascular   []  Pathology    []  Records       Assessment/Plan     Active Hospital Problems    Diagnosis  POA    **Right arm cellulitis [L03.113]  Yes    Cellulitis of arm, right [L03.113]  Yes    Opioid use disorder, severe, dependence [F11.20]  Yes    Benzodiazepine abuse [F13.10]  Yes    CKD (chronic kidney disease) " stage 2, GFR 60-89 ml/min [N18.2]  Yes    Anemia, chronic disease [D63.8]  Yes    History of Depression with anxiety [F41.8]  Yes      Resolved Hospital Problems   No resolved problems to display.       43 y.o. male admitted with Left and  Right arm cellulitis.      Bilateral arm cellulitis from heroin injection  --Previously seen by surgery and ID here last admission last month for same  -Blood cultures NGTD  -ID appreciated. Vancomycin IV continued.   IV ceftriaxone.  The time of discharge, switch to Bactrim DS and Augmentin end date of 5/15.   - Ortho/hand surgery doesn't recommend sx intervention.    -continue Pain control but stop IV narcotics.  Decrease frequency of p.o. Norco     Benzo abuse with patient reported history of withdrawal seizures  Opioid use disorder  -Patient reports 20-30 2mg Xanax a day  - Detox plan: Xanax 2mg TID scheduled for 2 days and now  Xanax 1mg for 2 days ending tonight. CIWA protocol in addition to scheduled Xanax but stop IV meds.   - scheduled gabapentin for symptoms and prn flexeril.   -Clonidine detox order set  - anticipate DC tmrw after weaning xanax. Per patient his previous detox episodes last at least 1 week or more. He plans to seek care a suboxone clinic after DC     C3 nephropathy  CKD stage 2  -Cr near baseline, continue to monitor     Chronic anemia, iron deficiency  - add ferrous sulfate   Nicotine use disorder-nicotine patch.        I discussed the patient's findings and my recommendations with patient      VTE Prophylaxis - SCDs.  Code Status - Full code.     Yesenia WILLSON  [facility]  05/08/24  13:13 EDT

## 2024-05-08 NOTE — PROGRESS NOTES
"[facility] Clinical Pharmacy Services: Vancomycin Pharmacokinetic Initial Consult Note    Ryan Marina is a 43 y.o. male who is on day 2 of pharmacy to dose vancomycin.    Indication: Skin and Soft Tissue  Consulting Provider: Timothy  Planned Duration of Therapy: 7  Loading Dose Ordered or Given: 1250 mg on 5/5 at 1500  MRSA PCR performed: no  Culture/Source:   5/5 BloodCx - pending x2  Target: Dose by Levels  Pertinent Vanc Dosing History: n/a  Other Antimicrobials: Zosyn    Vitals/Labs  Ht: 182.9 cm (72\"); Wt: 59 kg (130 lb)  Temp Readings from Last 1 Encounters:   05/08/24 97.5 °F (36.4 °C) (Oral)    Estimated Creatinine Clearance: 94.6 mL/min (by C-G formula based on SCr of 0.84 mg/dL).  MAUREEN     Results from last 7 days   Lab Units 05/07/24  0345 05/06/24  0440 05/05/24  1252   CREATININE mg/dL 0.84 1.04 1.33*   WBC 10*3/mm3 4.51 4.92 4.73     Lab Results   Component Value Date    VANCOTROUGH 9.40 05/08/2024    VANCORANDOM 8.40 05/06/2024      Assessment/Plan:    Vancomycin Dose: 1500 mg IV Q24h provides a subtherapeutic AUC. Will increase to 1750 mg q24h for a predicted AUC of 464. Vanc trough on 5/10 at 0400.     Pharmacy will follow patient's kidney function and will adjust doses and obtain levels as necessary. Thank you for involving pharmacy in this patient's care. Please contact pharmacy with any questions or concerns.                           [facility]  Clinical Pharmacist          "

## 2024-05-08 NOTE — PROGRESS NOTES
LOS: 3 days     Chief Complaint: Cellulitis    Interval History: Patient reports pain is improving in the upper extremities.  Erythema also improving.  Tolerating antibiotics.    Vital Signs  Temp:  [97.5 °F (36.4 °C)-98.1 °F (36.7 °C)] 97.5 °F (36.4 °C)  Heart Rate:  [72-80] 72  Resp:  [16-18] 16  BP: (114-123)/(71-76) 123/74    Physical Exam:  General: In no acute distress  HEENT: Oropharynx clear, moist mucous membranes  Respiratory: Normal work of breathing  Extremities: Bilateral upper extremity edema and erythema with numerous lesions.  Access: Peripheral IV    Antibiotics:  Anti-Infectives (From admission, onward)      Ordered     Dose/Rate Route Frequency Start Stop    05/06/24 0806  vancomycin IVPB 1500 mg in 0.9% NaCl (Premix) 500 mL        Ordering Provider: Osiel Cruz MD    1,500 mg  333.3 mL/hr over 90 Minutes Intravenous Every 24 Hours 05/06/24 0900 05/11/24 0859    05/06/24 0739  cefTRIAXone (ROCEPHIN) 2,000 mg in sodium chloride 0.9 % 100 mL MBP        Ordering Provider: Nigel Castillo DO    2,000 mg  200 mL/hr over 30 Minutes Intravenous Every 24 Hours 05/06/24 0830 05/11/24 0829    05/05/24 1724  Pharmacy to dose vancomycin        Ordering Provider: Kei Aguirre MD     Does not apply Continuous PRN 05/05/24 1724 05/10/24 1723    05/05/24 1332  Vancomycin HCl 1,250 mg in sodium chloride 0.9 % 250 mL VTB        Ordering Provider: Ryan Davis MD    20 mg/kg × 59 kg  200 mL/hr over 75 Minutes Intravenous Once 05/05/24 1348 05/05/24 1615    05/05/24 1332  piperacillin-tazobactam (ZOSYN) 3.375 g IVPB in 100 mL NS MBP (CD)        Ordering Provider: Ryan Davis MD    3.375 g  over 30 Minutes Intravenous Once 05/05/24 1348 05/05/24 1456             Results Review:     I reviewed the patient's new clinical results.    Lab Results   Component Value Date    WBC 4.51 05/07/2024    HGB 9.0 (L) 05/07/2024    HCT 27.4 (L) 05/07/2024    MCV 85.1 05/07/2024     05/07/2024      Lab Results   Component Value Date    GLUCOSE 90 05/07/2024    BUN 10 05/07/2024    CREATININE 0.84 05/07/2024    EGFRIFAFRI 51 (L) 01/22/2023    BCR 11.9 05/07/2024    CO2 23.4 05/07/2024    CALCIUM 9.1 05/07/2024    ALBUMIN 3.7 05/05/2024    LABIL2 0.9 (L) 01/22/2023    AST 21 05/05/2024    ALT 10 05/05/2024       Microbiology:  5/5 blood cultures no growth to date    Assessment    #Bilateral upper extremity cellulitis  #IVDU  #Opioid withdrawal  #Opioid abuse  #Benzodiazepine abuse  #MAUREEN on CKD     Erythema improving and no recommendations for surgery at this point from hand surgery.  Can continue ceftriaxone 2 g daily and vancomycin goal -600 while inpatient with transition on discharge to Bactrim 1 DS tablet twice daily plus Augmentin 875 twice daily to complete out 7 more days.  End date 5/15.  Counseled on cessation as this will remain his risk factor for continued infection.  Recommend outpatient hand surgery follow-up.       Thank you for allowing me to be involved in the care of this patient. Infectious diseases will sign off at this time with antibiotics plan in place, but please call me at [phone] if any further ID questions or new ID concerns.

## 2024-05-09 ENCOUNTER — READMISSION MANAGEMENT (OUTPATIENT)
Dept: CALL CENTER | Facility: HOSPITAL | Age: 44
End: 2024-05-09
Payer: MEDICARE

## 2024-05-09 VITALS
OXYGEN SATURATION: 100 % | WEIGHT: 130 LBS | SYSTOLIC BLOOD PRESSURE: 123 MMHG | BODY MASS INDEX: 17.61 KG/M2 | RESPIRATION RATE: 18 BRPM | HEIGHT: 72 IN | TEMPERATURE: 97.9 F | DIASTOLIC BLOOD PRESSURE: 75 MMHG | HEART RATE: 62 BPM

## 2024-05-09 PROBLEM — D50.9 IDA (IRON DEFICIENCY ANEMIA): Status: ACTIVE | Noted: 2024-05-09

## 2024-05-09 PROBLEM — L03.114 LEFT ARM CELLULITIS: Status: ACTIVE | Noted: 2024-05-09

## 2024-05-09 PROBLEM — F19.90 IVDU (INTRAVENOUS DRUG USER): Status: ACTIVE | Noted: 2024-05-09

## 2024-05-09 LAB
ANION GAP SERPL CALCULATED.3IONS-SCNC: 11.4 MMOL/L (ref 5–15)
BUN SERPL-MCNC: 13 MG/DL (ref 6–20)
BUN/CREAT SERPL: 13.5 (ref 7–25)
CALCIUM SPEC-SCNC: 8.8 MG/DL (ref 8.6–10.5)
CHLORIDE SERPL-SCNC: 109 MMOL/L (ref 98–107)
CO2 SERPL-SCNC: 22.6 MMOL/L (ref 22–29)
CREAT SERPL-MCNC: 0.96 MG/DL (ref 0.76–1.27)
DEPRECATED RDW RBC AUTO: 45.1 FL (ref 37–54)
EGFRCR SERPLBLD CKD-EPI 2021: 100.6 ML/MIN/1.73
ERYTHROCYTE [DISTWIDTH] IN BLOOD BY AUTOMATED COUNT: 14.4 % (ref 12.3–15.4)
GLUCOSE SERPL-MCNC: 94 MG/DL (ref 65–99)
HCT VFR BLD AUTO: 29.6 % (ref 37.5–51)
HGB BLD-MCNC: 9.6 G/DL (ref 13–17.7)
MCH RBC QN AUTO: 28 PG (ref 26.6–33)
MCHC RBC AUTO-ENTMCNC: 32.4 G/DL (ref 31.5–35.7)
MCV RBC AUTO: 86.3 FL (ref 79–97)
PLATELET # BLD AUTO: 266 10*3/MM3 (ref 140–450)
PMV BLD AUTO: 9.4 FL (ref 6–12)
POTASSIUM SERPL-SCNC: 3.6 MMOL/L (ref 3.5–5.2)
RBC # BLD AUTO: 3.43 10*6/MM3 (ref 4.14–5.8)
SODIUM SERPL-SCNC: 143 MMOL/L (ref 136–145)
WBC NRBC COR # BLD AUTO: 4.68 10*3/MM3 (ref 3.4–10.8)

## 2024-05-09 PROCEDURE — 25010000002 CEFTRIAXONE PER 250 MG: Performed by: STUDENT IN AN ORGANIZED HEALTH CARE EDUCATION/TRAINING PROGRAM

## 2024-05-09 PROCEDURE — 25810000003 SODIUM CHLORIDE 0.9 % SOLUTION: Performed by: NURSE PRACTITIONER

## 2024-05-09 PROCEDURE — 85027 COMPLETE CBC AUTOMATED: CPT | Performed by: NURSE PRACTITIONER

## 2024-05-09 PROCEDURE — 80048 BASIC METABOLIC PNL TOTAL CA: CPT | Performed by: NURSE PRACTITIONER

## 2024-05-09 PROCEDURE — 25010000002 VANCOMYCIN 10 G RECONSTITUTED SOLUTION: Performed by: NURSE PRACTITIONER

## 2024-05-09 RX ORDER — FERROUS SULFATE 325(65) MG
325 TABLET ORAL
Qty: 30 TABLET | Refills: 0 | Status: SHIPPED | OUTPATIENT
Start: 2024-05-10 | End: 2024-06-09

## 2024-05-09 RX ORDER — ONDANSETRON 4 MG/1
4 TABLET, ORALLY DISINTEGRATING ORAL EVERY 6 HOURS PRN
Qty: 30 TABLET | Refills: 0 | Status: SHIPPED | OUTPATIENT
Start: 2024-05-09

## 2024-05-09 RX ORDER — SULFAMETHOXAZOLE AND TRIMETHOPRIM 800; 160 MG/1; MG/1
1 TABLET ORAL 2 TIMES DAILY
Qty: 12 TABLET | Refills: 0 | Status: SHIPPED | OUTPATIENT
Start: 2024-05-09 | End: 2024-05-15

## 2024-05-09 RX ORDER — AMOXICILLIN AND CLAVULANATE POTASSIUM 875; 125 MG/1; MG/1
1 TABLET, FILM COATED ORAL 2 TIMES DAILY
Qty: 12 TABLET | Refills: 0 | Status: SHIPPED | OUTPATIENT
Start: 2024-05-09 | End: 2024-05-15

## 2024-05-09 RX ORDER — CHOLECALCIFEROL (VITAMIN D3) 125 MCG
1000 CAPSULE ORAL DAILY
Status: DISCONTINUED | OUTPATIENT
Start: 2024-05-09 | End: 2024-05-09 | Stop reason: HOSPADM

## 2024-05-09 RX ORDER — BUPRENORPHINE HYDROCHLORIDE AND NALOXONE HYDROCHLORIDE DIHYDRATE 8; 2 MG/1; MG/1
1 TABLET SUBLINGUAL DAILY
Start: 2024-05-09

## 2024-05-09 RX ORDER — GABAPENTIN 400 MG/1
400 CAPSULE ORAL 4 TIMES DAILY
Qty: 60 CAPSULE | Refills: 0 | Status: SHIPPED | OUTPATIENT
Start: 2024-05-09 | End: 2024-05-24

## 2024-05-09 RX ADMIN — SODIUM CHLORIDE 1750 MG: 9 INJECTION, SOLUTION INTRAVENOUS at 04:52

## 2024-05-09 RX ADMIN — FERROUS SULFATE TAB 325 MG (65 MG ELEMENTAL FE) 325 MG: 325 (65 FE) TAB at 08:12

## 2024-05-09 RX ADMIN — GABAPENTIN 400 MG: 400 CAPSULE ORAL at 08:12

## 2024-05-09 RX ADMIN — CEFTRIAXONE 2000 MG: 2 INJECTION, POWDER, FOR SOLUTION INTRAMUSCULAR; INTRAVENOUS at 08:12

## 2024-05-09 RX ADMIN — CYCLOBENZAPRINE 10 MG: 10 TABLET, FILM COATED ORAL at 07:08

## 2024-05-09 RX ADMIN — ALPRAZOLAM 1 MG: 1 TABLET ORAL at 08:12

## 2024-05-09 RX ADMIN — HYDROCODONE BITARTRATE AND ACETAMINOPHEN 1 TABLET: 5; 325 TABLET ORAL at 07:08

## 2024-05-09 RX ADMIN — Medication 10 ML: at 08:13

## 2024-05-09 RX ADMIN — GABAPENTIN 400 MG: 400 CAPSULE ORAL at 12:25

## 2024-05-09 NOTE — PLAN OF CARE
Goal Outcome Evaluation:  Plan of Care Reviewed With: patient        Progress: improving  Outcome Evaluation: Pt has been AOX4. HR has been hao this morning. Pt rested well. Pt was anxious at start of shift when patient was told that he would not have dilaudid available anymore. Pt states that he is really in pain still and was not sure he could make it with only Norco 5. Gave meds later than sheduled to spread out little longer. Vanc given this am. CTM, safety maintained.

## 2024-05-09 NOTE — CASE MANAGEMENT/SOCIAL WORK
Continued Stay Note  [facility]     Patient Name: Ryan Marina  MRN: [id number]  Today's Date: 5/9/2024    Admit Date: 5/5/2024    Plan: DC home, will need a cab voucher   Discharge Plan       Row Name 05/09/24 1041       Plan    Plan DC home, will need a cab voucher    Plan Comments CCP discussed disposition with pt. Advised pt to contact his suboxone clinic to arrange an appointment as soon as feasible. Pt is current with the [facility]. Pt will need assistance with transportation home.                   Discharge Codes    No documentation.                 Expected Discharge Date and Time       Expected Discharge Date Expected Discharge Time    May 9, 2024               Anahy Nixon RN

## 2024-05-09 NOTE — DISCHARGE SUMMARY
Patient Name: Ryan Marina  : 1980  MRN: [id number]    Date of Admission: 2024  Date of Discharge:  2024  Primary Care Physician: Provider, No Known      Chief Complaint:   Wound Check      Discharge Diagnoses     Active Hospital Problems    Diagnosis  POA    **Right arm cellulitis [L03.113]  Yes    Left arm cellulitis [L03.114]  Yes    IVDU (intravenous drug user) [F19.90]  Yes    PAT (iron deficiency anemia) [D50.9]  Yes    Cellulitis of arm, right [L03.113]  Yes    Opioid use disorder, severe, dependence [F11.20]  Yes    Benzodiazepine abuse [F13.10]  Yes    CKD (chronic kidney disease) stage 2, GFR 60-89 ml/min [N18.2]  Yes    Anemia, chronic disease [D63.8]  Yes    History of Depression with anxiety [F41.8]  Yes      Resolved Hospital Problems   No resolved problems to display.        Hospital Course     Mr. Marina is a 43 y.o. male with a history of IVDA, polysubstance abuse, chronic bilateral upper extremity wounds that presents with bilateral forearm lesions, erythema, edema, pain.  He has had drainage from the wounds but denies fever or chills.  On admission he was noted to have an elevated procalcitonin and CRP but no leukocytosis.  He was initiated on IV vancomycin and Zosyn.  He is also dehydrated with MAUREEN on admission given IV fluids.  Infectious disease continued patient on IV vancomycin but transition Zosyn to IV ceftriaxone.  Hand surgery consulted but did not feel the need for surgical intervention continue antibiotics and follow-up in the office.  Infectious disease recommends oral antibiotics at discharge to complete a full course through 5/15/2024.  Patient has been treated for opiate withdrawal and benzodiazepine abuse.  He was treated with a detox plan of tapered Xanax, CIWA protocol and opiate detox order set.  His symptoms have improved and he states he has a supply of Suboxone at home he can take until he is seen in the Suboxone clinic.  He declines other symptomatic  withdrawal medications at discharge except for gabapentin and Zofran.  He has been counseled on the importance of sobriety and avoiding IV drug use.        Day of Discharge     Subjective:  Arm pain persists  but it is improved since admission. No other new complaints or issues. Still c/o  withdrawal discomfort but NAD on exam     Physical Exam:  Temp:  [97.8 °F (36.6 °C)-98.4 °F (36.9 °C)] 97.9 °F (36.6 °C)  Heart Rate:  [62-98] 62  Resp:  [16-18] 18  BP: (111-127)/(64-75) 123/75  Body mass index is 17.63 kg/m².  Physical Exam  Vitals reviewed.   Constitutional:       Appearance: He is well-developed.      Comments: NAD not acutely ill appearing.    HENT:      Head: Normocephalic and atraumatic.      Mouth/Throat:      Mouth: Mucous membranes are moist.   Cardiovascular:      Rate and Rhythm: Normal rate and regular rhythm.   Pulmonary:      Effort: Pulmonary effort is normal. No respiratory distress.      Breath sounds: Normal breath sounds.   Abdominal:      General: Bowel sounds are normal. There is no distension.      Palpations: Abdomen is soft.      Tenderness: There is no abdominal tenderness.   Skin:     General: Skin is warm and dry.      Comments: Scabbed lesions on BUE with minimal erythema/ edema. Tender but decreased in severity    Neurological:      General: No focal deficit present.      Mental Status: He is alert and oriented to person, place, and time. Mental status is at baseline.   Psychiatric:         Mood and Affect: Mood normal.         Behavior: Behavior normal.         Consultants     Consult Orders (all) (From admission, onward)       Start     Ordered    05/06/24 0702  Inpatient Infectious Diseases Consult  IN AM        Specialty:  Infectious Diseases  Provider:  Osiel Cruz MD    05/05/24 1511    05/06/24 0702  Inpatient Orthopedic Surgery Consult  IN AM        Specialty:  Orthopedic Surgery  Provider:  Noelle Peres MD    05/05/24 1514    05/05/24 1506  Inpatient  "Access Center Consult  Once        Provider:  (Not yet assigned)    05/05/24 1506    05/05/24 1356  [facility] (on-call MD unless specified) Details  Once        Specialty:  Hospitalist  Provider:  (Not yet assigned)    05/05/24 1355                  Procedures     * Surgery not found *    Imaging Results (All)       None          Results for orders placed during the hospital encounter of 10/19/23    Duplex Venous Lower Extremity - Right CAR    Interpretation Summary    Acute right lower extremity deep vein thrombosis noted in the gastrocnemius.    All other right sided veins appeared normal.    Results for orders placed during the hospital encounter of 01/08/24    Adult Transthoracic Echo Complete W/ Cont if Necessary Per Protocol    Interpretation Summary    Left ventricular systolic function is normal. Calculated left ventricular EF = 55.4%    Left ventricular diastolic function was normal.    Pertinent Labs     Results from last 7 days   Lab Units 05/09/24 0613 05/07/24 0345 05/06/24 0440 05/05/24  1252   WBC 10*3/mm3 4.68 4.51 4.92 4.73   HEMOGLOBIN g/dL 9.6* 9.0* 9.2* 9.6*   PLATELETS 10*3/mm3 266 266 270 252     Results from last 7 days   Lab Units 05/09/24  0613 05/07/24 0345 05/06/24 0440 05/05/24  1252   SODIUM mmol/L 143 142 139 138   POTASSIUM mmol/L 3.6 3.7 4.2 4.0   CHLORIDE mmol/L 109* 109* 108* 105   CO2 mmol/L 22.6 23.4 22.8 23.6   BUN mg/dL 13 10 8 14   CREATININE mg/dL 0.96 0.84 1.04 1.33*   GLUCOSE mg/dL 94 90 108* 114*   EGFR mL/min/1.73 100.6 111.0 91.4 68.0     Results from last 7 days   Lab Units 05/05/24  1252   ALBUMIN g/dL 3.7   BILIRUBIN mg/dL 0.2   ALK PHOS U/L 99   AST (SGOT) U/L 21   ALT (SGPT) U/L 10     Results from last 7 days   Lab Units 05/09/24  0613 05/07/24 0345 05/06/24  0440 05/05/24  1252   CALCIUM mg/dL 8.8 9.1 8.8 8.8   ALBUMIN g/dL  --   --   --  3.7               Invalid input(s): \"LDLCALC\"  Results from last 7 days   Lab Units 05/05/24  1413 05/05/24  1313   BLOODCX  No " growth at 3 days No growth at 3 days         Test Results Pending at Discharge     Pending Labs       Order Current Status    Blood Culture - Blood, Arm, Left Preliminary result    Blood Culture - Blood, Arm, Left Preliminary result            Discharge Details        Discharge Medications        New Medications        Instructions Start Date   amoxicillin-clavulanate 875-125 MG per tablet  Commonly known as: AUGMENTIN   1 tablet, Oral, 2 Times Daily      ferrous sulfate 325 (65 FE) MG tablet   325 mg, Oral, Daily With Breakfast   Start Date: May 10, 2024     gabapentin 400 MG capsule  Commonly known as: NEURONTIN   400 mg, Oral, 4 Times Daily      ondansetron ODT 4 MG disintegrating tablet  Commonly known as: ZOFRAN-ODT   4 mg, Oral, Every 6 Hours PRN      sulfamethoxazole-trimethoprim 800-160 MG per tablet  Commonly known as: Bactrim DS   1 tablet, Oral, 2 Times Daily             Continue These Medications        Instructions Start Date   buprenorphine-naloxone 8-2 MG per SL tablet  Commonly known as: SUBOXONE   1 tablet, Sublingual, Daily, [facility].      nicotine 14 MG/24HR patch  Commonly known as: NICODERM CQ   1 patch, Transdermal, Every 24 Hours Scheduled               No Known Allergies    Discharge Disposition:  Home or Self Care      Discharge Diet:  Diet Order   Procedures    Diet: Regular/House; Fluid Consistency: Thin (IDDSI 0)       Discharge Activity:       CODE STATUS:    Code Status and Medical Interventions:   Ordered at: 05/05/24 1506     Code Status (Patient has no pulse and is not breathing):    CPR (Attempt to Resuscitate)     Medical Interventions (Patient has pulse or is breathing):    Full Support       Future Appointments   Date Time Provider Department Center   5/15/2024 10:30 AM Esmer Suarez APRN [facility]      Follow-up Information       Provider, No Known .    Contact information:  [facility] [address] [phone]               Ja  MD Noelle .    Specialties: Orthopedic Surgery, Hand Surgery  Contact information:  [address]  [facility] [address] [phone]               Noelle Peres MD .    Specialties: Orthopedic Surgery, Hand Surgery  Contact information:  [address] [facility] [address] [phone]                             Time Spent on Discharge:  Greater than 45 minutes      ANAMIKA Barnes  [facility]  05/09/24  12:01 EDT

## 2024-05-09 NOTE — CASE MANAGEMENT/SOCIAL WORK
Case Management Discharge Note      Final Note: Home, CCP provided cab voucher to pt as well as medications from [facility] pharmacy    Provided Post Acute Provider List?: N/A  Provided Post Acute Provider Quality & Resource List?: N/A    Selected Continued Care - Discharged on 5/9/2024 Admission date: 5/5/2024 - Discharge disposition: Home or Self Care      Destination    No services have been selected for the patient.                Durable Medical Equipment    No services have been selected for the patient.                Dialysis/Infusion    No services have been selected for the patient.                Home Medical Care    No services have been selected for the patient.                Therapy    No services have been selected for the patient.                Community Resources    No services have been selected for the patient.                Community & DME    No services have been selected for the patient.                    Transportation Services  Taxi: [employer]    Final Discharge Disposition Code: 01 - home or self-care

## 2024-05-10 ENCOUNTER — TRANSITIONAL CARE MANAGEMENT TELEPHONE ENCOUNTER (OUTPATIENT)
Dept: CALL CENTER | Facility: HOSPITAL | Age: 44
End: 2024-05-10
Payer: MEDICARE

## 2024-05-10 LAB
BACTERIA SPEC AEROBE CULT: NORMAL
BACTERIA SPEC AEROBE CULT: NORMAL

## 2024-05-14 ENCOUNTER — PATIENT OUTREACH (OUTPATIENT)
Age: 44
End: 2024-05-14
Payer: MEDICARE

## 2024-05-14 NOTE — OUTREACH NOTE
MSW received referral from nurse call center for assistance with community resources. MSW outreach to patient by phone and MSW unable to leave message as voicemail is not set up. MSW will continue to attempt outreach for assistance.     Luisa SAUNDERS -   Ambulatory Case Management    5/14/2024, 11:18 EDT

## 2024-05-16 ENCOUNTER — PATIENT OUTREACH (OUTPATIENT)
Age: 44
End: 2024-05-16
Payer: MEDICARE

## 2024-05-16 NOTE — OUTREACH NOTE
Social Work Assessment  Questions/Answers      Flowsheet Row Most Recent Value   Referral Source outpatient staff, outpatient clinic, physician   Reason for Consult community resources, insurance concerns   Preferred Language English   Advance Care Planning Reviewed no concerns identified   People in Home parent(s)   Current Living Arrangements home   Potentially Unsafe Housing Conditions none   In the past 12 months has the electric, gas, oil, or water company threatened to shut off services in your home? No   Primary Care Provided by self   Quality of Family Relationships helpful, involved, supportive   Employment Status disabled   Source of Income disability   Application for Public Assistance pending public assistance pending number   Usual Activity Tolerance moderate   Current Activity Tolerance moderate   Medications independent   Meal Preparation independent   Housekeeping independent   Laundry independent   Shopping independent   Transportation Anticipated family or friend will provide          SDOH updated and reviewed with the patient during this program:  --     Employment: Not At Risk (5/6/2024)    Employment     Do you want help finding or keeping work or a job?: I do not need or want help      Financial Resource Strain: Medium Risk (5/16/2024)    Overall Financial Resource Strain (CARDIA)     Difficulty of Paying Living Expenses: Somewhat hard      --     Food Insecurity: No Food Insecurity (5/16/2024)    Hunger Vital Sign     Worried About Running Out of Food in the Last Year: Never true     Ran Out of Food in the Last Year: Never true      --     Housing Stability: Low Risk  (5/16/2024)    Housing Stability Vital Sign     Unable to Pay for Housing in the Last Year: No     Number of Times Moved in the Last Year: 1     Homeless in the Last Year: No      --     Transportation Needs: Unmet Transportation Needs (5/16/2024)    PRAPARE - Transportation     Lack of Transportation (Medical): Yes     Lack of  Transportation (Non-Medical): Yes      --     Utilities: Not At Risk (5/16/2024)    [facility] Utilities     Threatened with loss of utilities: No       Patient Outreach    MSW outreach to patient to discuss community resource needs as requested per nurse call center. Patient states that his insurance has switched to Medicare due to disability benefits. Patient states that he previously had Medicaid and is unsure why Medicaid has stopped. Patient explained that his disability check went up $60 and he lost his Medicaid. MSW explained to patient that he can reapply for Medicaid benefits if he feels like he qualifies and have his questions answered by [facility] ([facility]) office. Patient discusses that he was utilizing transportation benefits. MSW and patient also discussed applying for Medicare Extra Help for the cost of premiums and deductibles. Patient states the he will contact the [facility] office to see if he is eligible for Medicaid or Medicare Extra Help program. Patient denies additional community resource needs at this time and will call back to MSW as needed.      Luisa SAUNDERS -   Ambulatory Case Management    5/16/2024, 09:58 EDT

## 2024-07-11 ENCOUNTER — HOSPITAL ENCOUNTER (INPATIENT)
Facility: HOSPITAL | Age: 44
LOS: 1 days | Discharge: HOME | End: 2024-07-13
Attending: STUDENT IN AN ORGANIZED HEALTH CARE EDUCATION/TRAINING PROGRAM | Admitting: INTERNAL MEDICINE
Payer: MEDICARE

## 2024-07-11 ENCOUNTER — APPOINTMENT (OUTPATIENT)
Dept: GENERAL RADIOLOGY | Facility: HOSPITAL | Age: 44
End: 2024-07-11
Payer: MEDICARE

## 2024-07-11 DIAGNOSIS — R60.0 LOWER EXTREMITY EDEMA: ICD-10-CM

## 2024-07-11 DIAGNOSIS — F11.90 OPIOID USE DISORDER: ICD-10-CM

## 2024-07-11 DIAGNOSIS — L03.114 LEFT ARM CELLULITIS: Primary | ICD-10-CM

## 2024-07-11 LAB
ALBUMIN SERPL-MCNC: 3.2 G/DL (ref 3.5–5.2)
ALBUMIN/GLOB SERPL: 1 G/DL
ALP SERPL-CCNC: 75 U/L (ref 39–117)
ALT SERPL W P-5'-P-CCNC: 16 U/L (ref 1–41)
ANION GAP SERPL CALCULATED.3IONS-SCNC: 8 MMOL/L (ref 5–15)
APTT PPP: 27.9 SECONDS (ref 22.7–35.4)
AST SERPL-CCNC: 25 U/L (ref 1–40)
BACTERIA UR QL AUTO: NORMAL /HPF
BASOPHILS # BLD AUTO: 0.02 10*3/MM3 (ref 0–0.2)
BASOPHILS NFR BLD AUTO: 0.5 % (ref 0–1.5)
BILIRUB SERPL-MCNC: 0.2 MG/DL (ref 0–1.2)
BILIRUB UR QL STRIP: NEGATIVE
BUN SERPL-MCNC: 15 MG/DL (ref 6–20)
BUN/CREAT SERPL: 10.4 (ref 7–25)
CALCIUM SPEC-SCNC: 8.8 MG/DL (ref 8.6–10.5)
CHLORIDE SERPL-SCNC: 103 MMOL/L (ref 98–107)
CK SERPL-CCNC: 302 U/L (ref 20–200)
CLARITY UR: CLEAR
CO2 SERPL-SCNC: 26 MMOL/L (ref 22–29)
COLOR UR: YELLOW
CREAT SERPL-MCNC: 1.44 MG/DL (ref 0.76–1.27)
D-LACTATE SERPL-SCNC: 1.4 MMOL/L (ref 0.5–2)
DEPRECATED RDW RBC AUTO: 42.2 FL (ref 37–54)
EGFRCR SERPLBLD CKD-EPI 2021: 61.8 ML/MIN/1.73
EOSINOPHIL # BLD AUTO: 0.09 10*3/MM3 (ref 0–0.4)
EOSINOPHIL NFR BLD AUTO: 2.1 % (ref 0.3–6.2)
ERYTHROCYTE [DISTWIDTH] IN BLOOD BY AUTOMATED COUNT: 14.3 % (ref 12.3–15.4)
GLOBULIN UR ELPH-MCNC: 3.2 GM/DL
GLUCOSE SERPL-MCNC: 82 MG/DL (ref 65–99)
GLUCOSE UR STRIP-MCNC: NEGATIVE MG/DL
HCT VFR BLD AUTO: 28.4 % (ref 37.5–51)
HGB BLD-MCNC: 9.3 G/DL (ref 13–17.7)
HGB UR QL STRIP.AUTO: ABNORMAL
HYALINE CASTS UR QL AUTO: NORMAL /LPF
IMM GRANULOCYTES # BLD AUTO: 0.01 10*3/MM3 (ref 0–0.05)
IMM GRANULOCYTES NFR BLD AUTO: 0.2 % (ref 0–0.5)
INR PPP: 1.05 (ref 0.9–1.1)
KETONES UR QL STRIP: NEGATIVE
LEUKOCYTE ESTERASE UR QL STRIP.AUTO: NEGATIVE
LYMPHOCYTES # BLD AUTO: 1.13 10*3/MM3 (ref 0.7–3.1)
LYMPHOCYTES NFR BLD AUTO: 25.9 % (ref 19.6–45.3)
MCH RBC QN AUTO: 27 PG (ref 26.6–33)
MCHC RBC AUTO-ENTMCNC: 32.7 G/DL (ref 31.5–35.7)
MCV RBC AUTO: 82.6 FL (ref 79–97)
MONOCYTES # BLD AUTO: 0.28 10*3/MM3 (ref 0.1–0.9)
MONOCYTES NFR BLD AUTO: 6.4 % (ref 5–12)
NEUTROPHILS NFR BLD AUTO: 2.84 10*3/MM3 (ref 1.7–7)
NEUTROPHILS NFR BLD AUTO: 64.9 % (ref 42.7–76)
NITRITE UR QL STRIP: NEGATIVE
NRBC BLD AUTO-RTO: 0 /100 WBC (ref 0–0.2)
NT-PROBNP SERPL-MCNC: 294 PG/ML (ref 0–450)
PH UR STRIP.AUTO: 5.5 [PH] (ref 5–8)
PLATELET # BLD AUTO: 330 10*3/MM3 (ref 140–450)
PMV BLD AUTO: 9.6 FL (ref 6–12)
POTASSIUM SERPL-SCNC: 3.9 MMOL/L (ref 3.5–5.2)
PROCALCITONIN SERPL-MCNC: 0.23 NG/ML (ref 0–0.25)
PROT SERPL-MCNC: 6.4 G/DL (ref 6–8.5)
PROT UR QL STRIP: NEGATIVE
PROTHROMBIN TIME: 13.9 SECONDS (ref 11.7–14.2)
QT INTERVAL: 381 MS
QTC INTERVAL: 429 MS
RBC # BLD AUTO: 3.44 10*6/MM3 (ref 4.14–5.8)
RBC # UR STRIP: NORMAL /HPF
REF LAB TEST METHOD: NORMAL
SODIUM SERPL-SCNC: 137 MMOL/L (ref 136–145)
SP GR UR STRIP: 1.02 (ref 1–1.03)
SQUAMOUS #/AREA URNS HPF: NORMAL /HPF
TROPONIN T SERPL HS-MCNC: 29 NG/L
UROBILINOGEN UR QL STRIP: ABNORMAL
WBC # UR STRIP: NORMAL /HPF
WBC NRBC COR # BLD AUTO: 4.37 10*3/MM3 (ref 3.4–10.8)

## 2024-07-11 PROCEDURE — 83605 ASSAY OF LACTIC ACID: CPT | Performed by: STUDENT IN AN ORGANIZED HEALTH CARE EDUCATION/TRAINING PROGRAM

## 2024-07-11 PROCEDURE — G0378 HOSPITAL OBSERVATION PER HR: HCPCS

## 2024-07-11 PROCEDURE — 71045 X-RAY EXAM CHEST 1 VIEW: CPT

## 2024-07-11 PROCEDURE — 84145 PROCALCITONIN (PCT): CPT | Performed by: STUDENT IN AN ORGANIZED HEALTH CARE EDUCATION/TRAINING PROGRAM

## 2024-07-11 PROCEDURE — 87040 BLOOD CULTURE FOR BACTERIA: CPT | Performed by: STUDENT IN AN ORGANIZED HEALTH CARE EDUCATION/TRAINING PROGRAM

## 2024-07-11 PROCEDURE — 36415 COLL VENOUS BLD VENIPUNCTURE: CPT | Performed by: STUDENT IN AN ORGANIZED HEALTH CARE EDUCATION/TRAINING PROGRAM

## 2024-07-11 PROCEDURE — 83880 ASSAY OF NATRIURETIC PEPTIDE: CPT | Performed by: STUDENT IN AN ORGANIZED HEALTH CARE EDUCATION/TRAINING PROGRAM

## 2024-07-11 PROCEDURE — 25010000002 VANCOMYCIN HCL 1.25 G RECONSTITUTED SOLUTION 1 EACH VIAL: Performed by: STUDENT IN AN ORGANIZED HEALTH CARE EDUCATION/TRAINING PROGRAM

## 2024-07-11 PROCEDURE — 84484 ASSAY OF TROPONIN QUANT: CPT | Performed by: STUDENT IN AN ORGANIZED HEALTH CARE EDUCATION/TRAINING PROGRAM

## 2024-07-11 PROCEDURE — 93005 ELECTROCARDIOGRAM TRACING: CPT | Performed by: STUDENT IN AN ORGANIZED HEALTH CARE EDUCATION/TRAINING PROGRAM

## 2024-07-11 PROCEDURE — 93010 ELECTROCARDIOGRAM REPORT: CPT | Performed by: INTERNAL MEDICINE

## 2024-07-11 PROCEDURE — 85025 COMPLETE CBC W/AUTO DIFF WBC: CPT | Performed by: STUDENT IN AN ORGANIZED HEALTH CARE EDUCATION/TRAINING PROGRAM

## 2024-07-11 PROCEDURE — 82550 ASSAY OF CK (CPK): CPT | Performed by: STUDENT IN AN ORGANIZED HEALTH CARE EDUCATION/TRAINING PROGRAM

## 2024-07-11 PROCEDURE — 99285 EMERGENCY DEPT VISIT HI MDM: CPT

## 2024-07-11 PROCEDURE — 25810000003 SODIUM CHLORIDE 0.9 % SOLUTION 250 ML FLEX CONT: Performed by: STUDENT IN AN ORGANIZED HEALTH CARE EDUCATION/TRAINING PROGRAM

## 2024-07-11 PROCEDURE — 25010000002 CEFTRIAXONE PER 250 MG: Performed by: STUDENT IN AN ORGANIZED HEALTH CARE EDUCATION/TRAINING PROGRAM

## 2024-07-11 PROCEDURE — 81001 URINALYSIS AUTO W/SCOPE: CPT | Performed by: STUDENT IN AN ORGANIZED HEALTH CARE EDUCATION/TRAINING PROGRAM

## 2024-07-11 PROCEDURE — 80053 COMPREHEN METABOLIC PANEL: CPT | Performed by: STUDENT IN AN ORGANIZED HEALTH CARE EDUCATION/TRAINING PROGRAM

## 2024-07-11 PROCEDURE — 85730 THROMBOPLASTIN TIME PARTIAL: CPT | Performed by: STUDENT IN AN ORGANIZED HEALTH CARE EDUCATION/TRAINING PROGRAM

## 2024-07-11 PROCEDURE — 93005 ELECTROCARDIOGRAM TRACING: CPT

## 2024-07-11 PROCEDURE — 85610 PROTHROMBIN TIME: CPT | Performed by: STUDENT IN AN ORGANIZED HEALTH CARE EDUCATION/TRAINING PROGRAM

## 2024-07-11 RX ORDER — ONDANSETRON 2 MG/ML
4 INJECTION INTRAMUSCULAR; INTRAVENOUS EVERY 6 HOURS PRN
Status: DISCONTINUED | OUTPATIENT
Start: 2024-07-11 | End: 2024-07-13 | Stop reason: HOSPADM

## 2024-07-11 RX ORDER — NICOTINE 21 MG/24HR
1 PATCH, TRANSDERMAL 24 HOURS TRANSDERMAL
Status: DISCONTINUED | OUTPATIENT
Start: 2024-07-12 | End: 2024-07-13 | Stop reason: HOSPADM

## 2024-07-11 RX ORDER — AMOXICILLIN 250 MG
2 CAPSULE ORAL 2 TIMES DAILY PRN
Status: DISCONTINUED | OUTPATIENT
Start: 2024-07-11 | End: 2024-07-13 | Stop reason: HOSPADM

## 2024-07-11 RX ORDER — ACETAMINOPHEN 325 MG/1
650 TABLET ORAL EVERY 4 HOURS PRN
Status: DISCONTINUED | OUTPATIENT
Start: 2024-07-11 | End: 2024-07-13 | Stop reason: HOSPADM

## 2024-07-11 RX ORDER — UREA 10 %
2.5 LOTION (ML) TOPICAL NIGHTLY PRN
Status: DISCONTINUED | OUTPATIENT
Start: 2024-07-11 | End: 2024-07-13 | Stop reason: HOSPADM

## 2024-07-11 RX ORDER — POLYETHYLENE GLYCOL 3350 17 G/17G
17 POWDER, FOR SOLUTION ORAL DAILY PRN
Status: DISCONTINUED | OUTPATIENT
Start: 2024-07-11 | End: 2024-07-13 | Stop reason: HOSPADM

## 2024-07-11 RX ORDER — BISACODYL 5 MG/1
5 TABLET, DELAYED RELEASE ORAL DAILY PRN
Status: DISCONTINUED | OUTPATIENT
Start: 2024-07-11 | End: 2024-07-13 | Stop reason: HOSPADM

## 2024-07-11 RX ORDER — ONDANSETRON 4 MG/1
4 TABLET, ORALLY DISINTEGRATING ORAL EVERY 6 HOURS PRN
Status: DISCONTINUED | OUTPATIENT
Start: 2024-07-11 | End: 2024-07-13 | Stop reason: HOSPADM

## 2024-07-11 RX ORDER — BISACODYL 10 MG
10 SUPPOSITORY, RECTAL RECTAL DAILY PRN
Status: DISCONTINUED | OUTPATIENT
Start: 2024-07-11 | End: 2024-07-13 | Stop reason: HOSPADM

## 2024-07-11 RX ORDER — GABAPENTIN 400 MG/1
400 CAPSULE ORAL 4 TIMES DAILY
Status: DISCONTINUED | OUTPATIENT
Start: 2024-07-11 | End: 2024-07-13 | Stop reason: HOSPADM

## 2024-07-11 RX ORDER — ACETAMINOPHEN 160 MG/5ML
650 SOLUTION ORAL EVERY 4 HOURS PRN
Status: DISCONTINUED | OUTPATIENT
Start: 2024-07-11 | End: 2024-07-13 | Stop reason: HOSPADM

## 2024-07-11 RX ORDER — ACETAMINOPHEN 650 MG/1
650 SUPPOSITORY RECTAL EVERY 4 HOURS PRN
Status: DISCONTINUED | OUTPATIENT
Start: 2024-07-11 | End: 2024-07-13 | Stop reason: HOSPADM

## 2024-07-11 RX ADMIN — GABAPENTIN 400 MG: 400 CAPSULE ORAL at 21:54

## 2024-07-11 RX ADMIN — CEFTRIAXONE SODIUM 1000 MG: 1 INJECTION, POWDER, FOR SOLUTION INTRAMUSCULAR; INTRAVENOUS at 20:04

## 2024-07-11 RX ADMIN — VANCOMYCIN HYDROCHLORIDE 1250 MG: 1.25 INJECTION, POWDER, LYOPHILIZED, FOR SOLUTION INTRAVENOUS at 20:49

## 2024-07-11 NOTE — ED NOTES
Pt reports he is here to be evaluated for heroin/fentanyl/benzo abuse & addiction. Pt states he injects himself several times a day. Pt has open wounds on both arms with swelling & redness. Pt states he last used heroin at noon today. Pt also reports he has worsening of chronic lower leg edema.

## 2024-07-11 NOTE — ED PROVIDER NOTES
EMERGENCY DEPARTMENT ENCOUNTER    Room Number:  13/13  PCP: Provider, No Known  History obtained from: Patient      HPI:  Chief Complaint: Increased leg swelling  A complete HPI/ROS/PMH/PSH/SH/FH are unobtainable due to: N/A  Context: Ryan Marina is a 43 y.o. male who presents to the ED c/o increased leg swelling.  Ongoing for the last several weeks, recently relapsed and is using IV heroin again.  Having difficulty finding a vein so now has been skin popping.  Now has some redness and tenderness on his left arm.  Also some low-grade fevers and subjective chills.  No other recent illness.  No abdominal pain, nausea, vomiting.            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    Vitamin B12 deficiency 04/21/2016    Vitamin D deficiency 04/21/2016    History of acute bronchitis 04/21/2016    History of acute sinusitis 04/21/2016    History of Depression with anxiety 04/21/2016    Rhabdomyolysis 01/08/2024    Cellulitis of arm 04/11/2024    Opioid use disorder, severe, dependence 04/12/2024    Benzodiazepine abuse 04/12/2024    Anemia, chronic disease 04/12/2024    CKD (chronic kidney disease) stage 2, GFR 60-89 ml/min 04/12/2024    Right arm cellulitis 05/05/2024    Cellulitis of arm, right 05/05/2024    Left arm cellulitis 05/09/2024    IVDU (intravenous drug user) 05/09/2024    PAT (iron deficiency anemia) 05/09/2024     Resolved Ambulatory Problems     Diagnosis Date Noted    No Resolved Ambulatory Problems     Past Medical History:   Diagnosis Date    Anemia     Arthritis     CHF (congestive heart failure)     Renal disorder          PAST SURGICAL HISTORY  Past Surgical  History:   Procedure Laterality Date    CHOLECYSTECTOMY  10/13/2009    10/13/2009--laparoscopic cholecystectomy.    FRACTURE SURGERY      KNEE ACL RECONSTRUCTION      1996 and 1999--anterior cruciate ligament reconstruction right knee.         FAMILY HISTORY  Family History   Problem Relation Age of Onset    Diabetes Mother         Type 2    Hyperthyroidism Father     Diabetes Maternal Grandmother         Type 2         SOCIAL HISTORY  Social History     Socioeconomic History    Marital status: Single   Tobacco Use    Smoking status: Every Day     Current packs/day: 1.50     Types: Cigarettes   Vaping Use    Vaping status: Never Used   Substance and Sexual Activity    Alcohol use: No    Drug use: Yes     Frequency: 7.0 times per week     Types: Heroin     Comment: States uses daily, injecting into muscle. Approx 3G/day    Sexual activity: Defer         ALLERGIES  Patient has no known allergies.        REVIEW OF SYSTEMS    As per HPI      PHYSICAL EXAM  ED Triage Vitals   Temp Heart Rate Resp BP SpO2   07/11/24 1232 07/11/24 1232 07/11/24 1232 07/11/24 1313 07/11/24 1232   100 °F (37.8 °C) 104 20 107/66 97 %      Temp src Heart Rate Source Patient Position BP Location FiO2 (%)   07/11/24 1232 -- -- -- --   Tympanic           Physical Exam  Constitutional:       General: He is not in acute distress.  HENT:      Head: Normocephalic and atraumatic.   Pulmonary:      Effort: No respiratory distress.   Abdominal:      General: There is no distension.      Tenderness: There is no abdominal tenderness.   Musculoskeletal:         General: No swelling or deformity.      Right lower leg: Edema present.      Left lower leg: Edema present.      Comments: Diffuse edema bilateral lower extremities with pitting, also bilateral upper extremities are edematous with numerous skin lesions over the dorsums of forearms bilaterally, on the left the skin is erythematous and tender to palpation from the wrist up to the elbow, no obvious  drainage or fluctuance   Skin:     General: Skin is warm and dry.   Neurological:      General: No focal deficit present.      Mental Status: He is alert. Mental status is at baseline.           Vital signs and nursing notes reviewed.          LAB RESULTS  Recent Results (from the past 24 hour(s))   ECG 12 Lead Chest Pain    Collection Time: 07/11/24 12:44 PM   Result Value Ref Range    QT Interval 381 ms    QTC Interval 429 ms   Comprehensive Metabolic Panel    Collection Time: 07/11/24  2:00 PM    Specimen: Blood   Result Value Ref Range    Glucose 82 65 - 99 mg/dL    BUN 15 6 - 20 mg/dL    Creatinine 1.44 (H) 0.76 - 1.27 mg/dL    Sodium 137 136 - 145 mmol/L    Potassium 3.9 3.5 - 5.2 mmol/L    Chloride 103 98 - 107 mmol/L    CO2 26.0 22.0 - 29.0 mmol/L    Calcium 8.8 8.6 - 10.5 mg/dL    Total Protein 6.4 6.0 - 8.5 g/dL    Albumin 3.2 (L) 3.5 - 5.2 g/dL    ALT (SGPT) 16 1 - 41 U/L    AST (SGOT) 25 1 - 40 U/L    Alkaline Phosphatase 75 39 - 117 U/L    Total Bilirubin 0.2 0.0 - 1.2 mg/dL    Globulin 3.2 gm/dL    A/G Ratio 1.0 g/dL    BUN/Creatinine Ratio 10.4 7.0 - 25.0    Anion Gap 8.0 5.0 - 15.0 mmol/L    eGFR 61.8 >60.0 mL/min/1.73   Protime-INR    Collection Time: 07/11/24  2:00 PM    Specimen: Blood   Result Value Ref Range    Protime 13.9 11.7 - 14.2 Seconds    INR 1.05 0.90 - 1.10   aPTT    Collection Time: 07/11/24  2:00 PM    Specimen: Blood   Result Value Ref Range    PTT 27.9 22.7 - 35.4 seconds   BNP    Collection Time: 07/11/24  2:00 PM    Specimen: Blood   Result Value Ref Range    proBNP 294.0 0.0 - 450.0 pg/mL   Single High Sensitivity Troponin T    Collection Time: 07/11/24  2:00 PM    Specimen: Blood   Result Value Ref Range    HS Troponin T 29 (H) <22 ng/L   Lactic Acid, Plasma    Collection Time: 07/11/24  2:00 PM    Specimen: Blood   Result Value Ref Range    Lactate 1.4 0.5 - 2.0 mmol/L   Procalcitonin    Collection Time: 07/11/24  2:00 PM    Specimen: Blood   Result Value Ref Range     Procalcitonin 0.23 0.00 - 0.25 ng/mL   CK    Collection Time: 07/11/24  2:00 PM    Specimen: Blood   Result Value Ref Range    Creatine Kinase 302 (H) 20 - 200 U/L   CBC Auto Differential    Collection Time: 07/11/24  2:00 PM    Specimen: Blood   Result Value Ref Range    WBC 4.37 3.40 - 10.80 10*3/mm3    RBC 3.44 (L) 4.14 - 5.80 10*6/mm3    Hemoglobin 9.3 (L) 13.0 - 17.7 g/dL    Hematocrit 28.4 (L) 37.5 - 51.0 %    MCV 82.6 79.0 - 97.0 fL    MCH 27.0 26.6 - 33.0 pg    MCHC 32.7 31.5 - 35.7 g/dL    RDW 14.3 12.3 - 15.4 %    RDW-SD 42.2 37.0 - 54.0 fl    MPV 9.6 6.0 - 12.0 fL    Platelets 330 140 - 450 10*3/mm3    Neutrophil % 64.9 42.7 - 76.0 %    Lymphocyte % 25.9 19.6 - 45.3 %    Monocyte % 6.4 5.0 - 12.0 %    Eosinophil % 2.1 0.3 - 6.2 %    Basophil % 0.5 0.0 - 1.5 %    Immature Grans % 0.2 0.0 - 0.5 %    Neutrophils, Absolute 2.84 1.70 - 7.00 10*3/mm3    Lymphocytes, Absolute 1.13 0.70 - 3.10 10*3/mm3    Monocytes, Absolute 0.28 0.10 - 0.90 10*3/mm3    Eosinophils, Absolute 0.09 0.00 - 0.40 10*3/mm3    Basophils, Absolute 0.02 0.00 - 0.20 10*3/mm3    Immature Grans, Absolute 0.01 0.00 - 0.05 10*3/mm3    nRBC 0.0 0.0 - 0.2 /100 WBC   Urinalysis With Microscopic If Indicated (No Culture) - Urine, Clean Catch    Collection Time: 07/11/24  2:31 PM    Specimen: Urine, Clean Catch   Result Value Ref Range    Color, UA Yellow Yellow, Straw    Appearance, UA Clear Clear    pH, UA 5.5 5.0 - 8.0    Specific Gravity, UA 1.018 1.005 - 1.030    Glucose, UA Negative Negative    Ketones, UA Negative Negative    Bilirubin, UA Negative Negative    Blood, UA Trace (A) Negative    Protein, UA Negative Negative    Leuk Esterase, UA Negative Negative    Nitrite, UA Negative Negative    Urobilinogen, UA 1.0 E.U./dL 0.2 - 1.0 E.U./dL   Urinalysis, Microscopic Only - Urine, Clean Catch    Collection Time: 07/11/24  2:31 PM    Specimen: Urine, Clean Catch   Result Value Ref Range    RBC, UA 0-2 None Seen, 0-2 /HPF    WBC, UA 0-2 None  Seen, 0-2 /HPF    Bacteria, UA None Seen None Seen /HPF    Squamous Epithelial Cells, UA 0-2 None Seen, 0-2 /HPF    Hyaline Casts, UA 0-2 None Seen /LPF    Methodology Automated Microscopy        Ordered the above labs and reviewed the results.        RADIOLOGY  XR Chest 1 View    Result Date: 7/11/2024  XR CHEST 1 VW-7/11/2024  HISTORY: Chest pain.  Heart size is within normal limits. Lungs appear free of acute infiltrates. Bones and soft tissues are unremarkable.      1. No acute process.   This report was finalized on 7/11/2024 2:11 PM by Dr. Chuckie De La O M.D on Workstation: [place]       Ordered the above noted radiological studies. Reviewed by me in PACS.                MEDICATIONS GIVEN IN ER  Medications   Vancomycin HCl 1,250 mg in sodium chloride 0.9 % 250 mL VTB (has no administration in time range)   cefTRIAXone (ROCEPHIN) 1,000 mg in sodium chloride 0.9 % 100 mL MBP (has no administration in time range)               MEDICAL DECISION MAKING, PROGRESS, and CONSULTS    MDM: Patient presented emergency department with worsening peripheral edema as well as concern for superimposed bacterial infection of his left arm.  Otherwise chronically ill-appearing, vital stable.  Borderline temperature in the ER.  Blood culture sent, pending.  Started on broad-spectrum antibiotics.  Labs significant for mild acute kidney injury, unclear whether prerenal or related to congestive nephropathy.  Discussed with inpatient team, will admit for further management and evaluation of his symptoms.    All labs have been independently reviewed by me.  All radiology studies have been reviewed by me and I have also reviewed the radiology report.   EKG's independently viewed and interpreted by me.  Discussion below represents my analysis of pertinent findings related to patient's condition, differential diagnosis, treatment plan and final disposition.      Additional sources:  - Discussed/ obtained information from independent  historians:      - External (non-ED) record review: Last echocardiogram in January of this year was reportedly normal.    - Chronic or social conditions impacting care: Opioid use disorder, IV drug use    - Shared decision making: Discussed plan for admission, patient agrees.      Orders placed during this visit:  Orders Placed This Encounter   Procedures    Blood Culture - Blood,    Blood Culture - Blood,    XR Chest 1 View    Comprehensive Metabolic Panel    Urinalysis With Microscopic If Indicated (No Culture) - Urine, Clean Catch    Protime-INR    aPTT    BNP    Single High Sensitivity Troponin T    Lactic Acid, Plasma    Procalcitonin    CK    CBC Auto Differential    Urinalysis, Microscopic Only - Urine, Clean Catch    LHA (on-call MD unless specified) Details    ECG 12 Lead Chest Pain    Initiate Observation Status    CBC & Differential         Additional orders considered but not ordered:  Considered CTA of the chest however no indication at this time, patient has no tachycardia or shortness of breath.        Differential diagnosis includes but is not limited to:    Pulmonary hypertension, tricuspid regurgitation, peripheral edema, heart failure exacerbation, acute kidney injury, bacteremia, bacterial endocarditis, cellulitis      Independent interpretation of labs, radiology studies, and discussions with consultants:     Chest x-ray interpreted myself:  No infiltrate or pneumothorax    EKG interpreted myself:  1244, sinus rhythm rate of 76, no acute ST segment changes or T wave inversions.    DIAGNOSIS  Final diagnoses:   Left arm cellulitis   Lower extremity edema   Opioid use disorder         DISPOSITION  Admitted to telemetry        Latest Documented Vital Signs:  As of 15:28 EDT  BP- 99/59 HR- 76 Temp- 100 °F (37.8 °C) (Tympanic) O2 sat- 99%              --    Please note that portions of this were completed with a voice recognition program.       Note Disclaimer: At [facility], we believe that  sharing information builds trust and better relationships. You are receiving this note because you are receiving care at [facility] or recently visited. It is possible you will see health information before a provider has talked with you about it. This kind of information can be easy to misunderstand. To help you fully understand what it means for your health, we urge you to discuss this note with your provider.             Neptali Wang MD  07/11/24 1532       Neptali Wang MD  07/11/24 1536

## 2024-07-11 NOTE — PLAN OF CARE
Problem: Adult Inpatient Plan of Care  Goal: Plan of Care Review  Outcome: Ongoing, Progressing  Goal: Patient-Specific Goal (Individualized)  Outcome: Ongoing, Progressing  Goal: Absence of Hospital-Acquired Illness or Injury  Outcome: Ongoing, Progressing  Intervention: Identify and Manage Fall Risk  Recent Flowsheet Documentation  Taken 7/11/2024 1721 by Nila Minaya RN  Safety Promotion/Fall Prevention:   activity supervised   safety round/check completed  Intervention: Prevent Skin Injury  Recent Flowsheet Documentation  Taken 7/11/2024 1721 by Nila Minaya RN  Body Position: position changed independently  Intervention: Prevent and Manage VTE (Venous Thromboembolism) Risk  Recent Flowsheet Documentation  Taken 7/11/2024 1721 by Nila Minaya RN  Activity Management:   activity encouraged   activity minimized  Range of Motion: active ROM (range of motion) encouraged  Intervention: Prevent Infection  Recent Flowsheet Documentation  Taken 7/11/2024 1721 by Nila Minaya RN  Infection Prevention:   environmental surveillance performed   hand hygiene promoted   single patient room provided  Goal: Optimal Comfort and Wellbeing  Outcome: Ongoing, Progressing  Goal: Readiness for Transition of Care  Outcome: Ongoing, Progressing  Intervention: Mutually Develop Transition Plan  Recent Flowsheet Documentation  Taken 7/11/2024 1705 by Nila Minaya RN  Transportation Anticipated: (NEEDS TRANSPORTATION) other (see comments)  Patient/Family Anticipated Services at Transition: none  Patient/Family Anticipates Transition to: home  Taken 7/11/2024 1704 by Nila Minaya RN  Equipment Currently Used at Home:   walker, standard   cane, straight     Problem: Fall Injury Risk  Goal: Absence of Fall and Fall-Related Injury  Outcome: Ongoing, Progressing  Intervention: Identify and Manage Contributors  Recent Flowsheet Documentation  Taken 7/11/2024 1721 by Nila Minaya RN  Medication Review/Management: medications  reviewed  Intervention: Promote Injury-Free Environment  Recent Flowsheet Documentation  Taken 7/11/2024 1721 by Nila Minaya RN  Safety Promotion/Fall Prevention:   activity supervised   safety round/check completed     Problem: Infection  Goal: Absence of Infection Signs and Symptoms  Outcome: Ongoing, Progressing     Problem: Impaired Wound Healing  Goal: Optimal Wound Healing  Outcome: Ongoing, Progressing  Intervention: Promote Wound Healing  Recent Flowsheet Documentation  Taken 7/11/2024 1721 by Nila Minaya RN  Activity Management:   activity encouraged   activity minimized     Problem: Pain Acute  Goal: Acceptable Pain Control and Functional Ability  Outcome: Ongoing, Progressing  Intervention: Prevent or Manage Pain  Recent Flowsheet Documentation  Taken 7/11/2024 1721 by Nila Minaya RN  Medication Review/Management: medications reviewed     Problem: Fluid Volume Excess  Goal: Fluid Balance  Outcome: Ongoing, Progressing   Goal Outcome Evaluation:

## 2024-07-11 NOTE — ED NOTES
"Nursing report ED to floor  Ryan Marina  43 y.o.  male    HPI :  HPI (Adult)  Stated Reason for Visit: CP, SOA, leg swelling  History Obtained From: patient    Chief Complaint  Chief Complaint   Patient presents with    Chest Pain    Shortness of Breath    Leg Swelling       Admitting doctor:   Cristine Matos MD    Admitting diagnosis:   The primary encounter diagnosis was Left arm cellulitis. Diagnoses of Lower extremity edema and Opioid use disorder were also pertinent to this visit.    Code status:   Current Code Status       Date Active Code Status Order ID Comments User Context       Prior            Allergies:   Patient has no known allergies.    Isolation:   No active isolations    Intake and Output  No intake or output data in the 24 hours ending 07/11/24 1607    Weight:       07/11/24  1333   Weight: 59 kg (130 lb)       Most recent vitals:   Vitals:    07/11/24 1324 07/11/24 1333 07/11/24 1531 07/11/24 1601   BP: 99/59  117/69 121/64   Pulse: 76  80 86   Resp: 16  18 18   Temp:    99 °F (37.2 °C)   TempSrc:       SpO2: 99%  100% 100%   Weight:  59 kg (130 lb)     Height:  182.9 cm (72\")         Active LDAs/IV Access:   Lines, Drains & Airways       Active LDAs       Name Placement date Placement time Site Days    Peripheral IV 07/11/24 1332 Anterior;Right;Upper Arm 07/11/24  1332  Arm  less than 1                    Labs (abnormal labs have a star):   Labs Reviewed   COMPREHENSIVE METABOLIC PANEL - Abnormal; Notable for the following components:       Result Value    Creatinine 1.44 (*)     Albumin 3.2 (*)     All other components within normal limits    Narrative:     GFR Normal >60  Chronic Kidney Disease <60  Kidney Failure <15     URINALYSIS W/ MICROSCOPIC IF INDICATED (NO CULTURE) - Abnormal; Notable for the following components:    Blood, UA Trace (*)     All other components within normal limits   SINGLE HS TROPONIN T - Abnormal; Notable for the following components:    HS Troponin T 29 (*)  "    All other components within normal limits    Narrative:     High Sensitive Troponin T Reference Range:  <14.0 ng/L- Negative Female for AMI  <22.0 ng/L- Negative Male for AMI  >=14 - Abnormal Female indicating possible myocardial injury.  >=22 - Abnormal Male indicating possible myocardial injury.   Clinicians would have to utilize clinical acumen, EKG, Troponin, and serial changes to determine if it is an Acute Myocardial Infarction or myocardial injury due to an underlying chronic condition.        CK - Abnormal; Notable for the following components:    Creatine Kinase 302 (*)     All other components within normal limits   CBC WITH AUTO DIFFERENTIAL - Abnormal; Notable for the following components:    RBC 3.44 (*)     Hemoglobin 9.3 (*)     Hematocrit 28.4 (*)     All other components within normal limits   PROTIME-INR - Normal   APTT - Normal   BNP (IN-HOUSE) - Normal    Narrative:     This assay is used as an aid in the diagnosis of individuals suspected of having heart failure. It can be used as an aid in the diagnosis of acute decompensated heart failure (ADHF) in patients presenting with signs and symptoms of ADHF to the emergency department (ED). In addition, NT-proBNP of <300 pg/mL indicates ADHF is not likely.    Age Range Result Interpretation  NT-proBNP Concentration (pg/mL:      <50             Positive            >450                   Gray                 300-450                    Negative             <300    50-75           Positive            >900                  Gray                300-900                  Negative            <300      >75             Positive            >1800                  Gray                300-1800                  Negative            <300   LACTIC ACID, PLASMA - Normal   PROCALCITONIN - Normal    Narrative:     As a Marker for Sepsis (Non-Neonates):    1. <0.5 ng/mL represents a low risk of severe sepsis and/or septic shock.  2. >2 ng/mL represents a high risk of  "severe sepsis and/or septic shock.    As a Marker for Lower Respiratory Tract Infections that require antibiotic therapy:    PCT on Admission    Antibiotic Therapy       6-12 Hrs later    >0.5                Strongly Recommended  >0.25 - <0.5        Recommended   0.1 - 0.25          Discouraged              Remeasure/reassess PCT  <0.1                Strongly Discouraged     Remeasure/reassess PCT    As 28 day mortality risk marker: \"Change in Procalcitonin Result\" (>80% or <=80%) if Day 0 (or Day 1) and Day 4 values are available. Refer to http://www.[email or web address] [email or web address] [facility]-pct-calculator.com    Change in PCT <=80%  A decrease of PCT levels below or equal to 80% defines a positive change in PCT test result representing a higher risk for 28-day all-cause mortality of patients diagnosed with severe sepsis for septic shock.    Change in PCT >80%  A decrease of PCT levels of more than 80% defines a negative change in PCT result representing a lower risk for 28-day all-cause mortality of patients diagnosed with severe sepsis or septic shock.      BLOOD CULTURE   BLOOD CULTURE   URINALYSIS, MICROSCOPIC ONLY   CBC AND DIFFERENTIAL    Narrative:     The following orders were created for panel order CBC & Differential.  Procedure                               Abnormality         Status                     ---------                               -----------         ------                     CBC Auto Differential[[id number]]        Abnormal            Final result                 Please view results for these tests on the individual orders.       EKG:   ECG 12 Lead Chest Pain   Preliminary Result   HEART RATE=76  bpm   RR Gdjrzswm=899  ms   ID Zhtjszug=902  ms   P Horizontal Axis=15  deg   P Front Axis=51  deg   QRSD Interval=93  ms   QT Vxvulchd=106  ms   LKnO=072  ms   QRS Axis=13  deg   T Wave Axis=57  deg   - NORMAL ECG -   Sinus rhythm   ST elev, probable normal early repol pattern   Date and Time of Study:2024-07-11 12:44:04    "       Meds given in ED:   Medications   Vancomycin HCl 1,250 mg in sodium chloride 0.9 % 250 mL VTB (has no administration in time range)   cefTRIAXone (ROCEPHIN) 1,000 mg in sodium chloride 0.9 % 100 mL MBP (has no administration in time range)       Imaging results:  XR Chest 1 View    Result Date: 7/11/2024  1. No acute process.   This report was finalized on 7/11/2024 2:11 PM by Dr. Chuckie De La O M.D on Workstation: [place]       Ambulatory status:   - assist    Social issues:   Social History     Socioeconomic History    Marital status: Single   Tobacco Use    Smoking status: Every Day     Current packs/day: 1.50     Types: Cigarettes   Vaping Use    Vaping status: Never Used   Substance and Sexual Activity    Alcohol use: No    Drug use: Yes     Frequency: 7.0 times per week     Types: Heroin     Comment: States uses daily, injecting into muscle. Approx 3G/day    Sexual activity: Defer       Peripheral Neurovascular  Peripheral Neurovascular (Adult)  Peripheral Neurovascular WDL: WDL    Neuro Cognitive  Neuro Cognitive (Adult)  Cognitive/Neuro/Behavioral WDL: WDL    Learning  Learning Assessment (Adult)  Learning Readiness and Ability: no barriers identified    Respiratory  Respiratory (Adult)  Airway WDL: WDL  Respiratory WDL  Respiratory WDL: WDL    Abdominal Pain       Pain Assessments  Pain (Adult)  (0-10) Pain Rating: Rest: 9  Pain Side/Orientation: generalized    NIH Stroke Scale       Marisol Dominguez RN  07/11/24 16:07 EDT

## 2024-07-11 NOTE — PROGRESS NOTES
Clinical Pharmacy Services: Medication History    Ryan Marina is a 43 y.o. male presenting to [facility] for   Chief Complaint   Patient presents with    Chest Pain    Shortness of Breath    Leg Swelling       He  has a past medical history of Anemia, Arthritis, CHF (congestive heart failure), Renal disorder, and Vitamin D deficiency.    Allergies as of 07/11/2024    (No Known Allergies)       Medication information was obtained from: Patient   Pharmacy and Phone Number:     Prior to Admission Medications       Prescriptions Last Dose Informant Patient Reported? Taking?    buprenorphine-naloxone (SUBOXONE) 8-2 MG per SL tablet   No No    Place 1 tablet under the tongue Daily. [facility].    gabapentin (NEURONTIN) 400 MG capsule   No No    Take 1 capsule by mouth 4 (Four) Times a Day for 15 days.    nicotine (NICODERM CQ) 14 MG/24HR patch   No No    Place 1 patch on the skin as directed by provider Daily.    ondansetron ODT (ZOFRAN-ODT) 4 MG disintegrating tablet   No No    Take 1 tablet by mouth Every 6 (Six) Hours As Needed for Nausea or Vomiting for up to 30 doses.              Medication notes: Patient stated he is not on any medications.     This medication list is complete to the best of my knowledge as of 7/11/2024    Please call if questions.    Hima Ba  Medication History Technician   [phone]    7/11/2024 16:28 EDT

## 2024-07-11 NOTE — CONSULTS
Pt awake in bed, welcomed Chp hospitality visit, declined any pastoral care services at this time but expressed appreciation; did ask Chp to help find remote for TV. Chp consulted with RN.     Chaplains remain available if requested.

## 2024-07-12 ENCOUNTER — APPOINTMENT (OUTPATIENT)
Dept: CARDIOLOGY | Facility: HOSPITAL | Age: 44
End: 2024-07-12
Payer: MEDICARE

## 2024-07-12 PROBLEM — Z72.0 TOBACCO USE: Status: ACTIVE | Noted: 2024-07-12

## 2024-07-12 LAB
ANION GAP SERPL CALCULATED.3IONS-SCNC: 9.8 MMOL/L (ref 5–15)
AORTIC ARCH: 2.6 CM
AORTIC DIMENSIONLESS INDEX: 0.7 (DI)
ASCENDING AORTA: 2.5 CM
BH CV ECHO MEAS - ACS: 2.19 CM
BH CV ECHO MEAS - AO MAX PG: 9.9 MMHG
BH CV ECHO MEAS - AO MEAN PG: 6 MMHG
BH CV ECHO MEAS - AO ROOT DIAM: 3 CM
BH CV ECHO MEAS - AO V2 MAX: 157 CM/SEC
BH CV ECHO MEAS - AO V2 VTI: 31.7 CM
BH CV ECHO MEAS - AVA(I,D): 2.48 CM2
BH CV ECHO MEAS - EDV(CUBED): 113 ML
BH CV ECHO MEAS - EDV(MOD-SP2): 124 ML
BH CV ECHO MEAS - EDV(MOD-SP4): 114 ML
BH CV ECHO MEAS - EF(MOD-BP): 60.9 %
BH CV ECHO MEAS - EF(MOD-SP2): 58.9 %
BH CV ECHO MEAS - EF(MOD-SP4): 64 %
BH CV ECHO MEAS - ESV(CUBED): 28.8 ML
BH CV ECHO MEAS - ESV(MOD-SP2): 51 ML
BH CV ECHO MEAS - ESV(MOD-SP4): 41 ML
BH CV ECHO MEAS - FS: 36.6 %
BH CV ECHO MEAS - IVS/LVPW: 1.07 CM
BH CV ECHO MEAS - IVSD: 0.93 CM
BH CV ECHO MEAS - LAT PEAK E' VEL: 24.9 CM/SEC
BH CV ECHO MEAS - LV DIASTOLIC VOL/BSA (35-75): 60.1 CM2
BH CV ECHO MEAS - LV MASS(C)D: 150 GRAMS
BH CV ECHO MEAS - LV MAX PG: 4.7 MMHG
BH CV ECHO MEAS - LV MEAN PG: 2 MMHG
BH CV ECHO MEAS - LV SYSTOLIC VOL/BSA (12-30): 21.6 CM2
BH CV ECHO MEAS - LV V1 MAX: 108 CM/SEC
BH CV ECHO MEAS - LV V1 VTI: 21.7 CM
BH CV ECHO MEAS - LVIDD: 4.8 CM
BH CV ECHO MEAS - LVIDS: 3.1 CM
BH CV ECHO MEAS - LVOT AREA: 3.6 CM2
BH CV ECHO MEAS - LVOT DIAM: 2.15 CM
BH CV ECHO MEAS - LVPWD: 0.87 CM
BH CV ECHO MEAS - MED PEAK E' VEL: 16.5 CM/SEC
BH CV ECHO MEAS - MR MAX PG: 3.5 MMHG
BH CV ECHO MEAS - MR MAX VEL: 93.7 CM/SEC
BH CV ECHO MEAS - MV A DUR: 0.11 SEC
BH CV ECHO MEAS - MV A MAX VEL: 53.4 CM/SEC
BH CV ECHO MEAS - MV DEC SLOPE: 365 CM/SEC2
BH CV ECHO MEAS - MV DEC TIME: 0.2 SEC
BH CV ECHO MEAS - MV E MAX VEL: 72.3 CM/SEC
BH CV ECHO MEAS - MV E/A: 1.36
BH CV ECHO MEAS - MV MAX PG: 2.7 MMHG
BH CV ECHO MEAS - MV MEAN PG: 1.08 MMHG
BH CV ECHO MEAS - MV P1/2T: 67.9 MSEC
BH CV ECHO MEAS - MV V2 VTI: 21.9 CM
BH CV ECHO MEAS - MVA(P1/2T): 3.2 CM2
BH CV ECHO MEAS - MVA(VTI): 3.6 CM2
BH CV ECHO MEAS - PA ACC TIME: 0.12 SEC
BH CV ECHO MEAS - PA V2 MAX: 100 CM/SEC
BH CV ECHO MEAS - PULM A REVS DUR: 0.09 SEC
BH CV ECHO MEAS - PULM A REVS VEL: 37.8 CM/SEC
BH CV ECHO MEAS - PULM DIAS VEL: 45.6 CM/SEC
BH CV ECHO MEAS - PULM S/D: 1.21
BH CV ECHO MEAS - PULM SYS VEL: 55.3 CM/SEC
BH CV ECHO MEAS - RAP SYSTOLE: 3 MMHG
BH CV ECHO MEAS - RV MAX PG: 1.77 MMHG
BH CV ECHO MEAS - RV V1 MAX: 65.3 CM/SEC
BH CV ECHO MEAS - RV V1 VTI: 15.2 CM
BH CV ECHO MEAS - RVSP: 13 MMHG
BH CV ECHO MEAS - SUP REN AO DIAM: 2 CM
BH CV ECHO MEAS - SV(LVOT): 78.7 ML
BH CV ECHO MEAS - SV(MOD-SP2): 73 ML
BH CV ECHO MEAS - SV(MOD-SP4): 73 ML
BH CV ECHO MEAS - SVI(LVOT): 41.5 ML/M2
BH CV ECHO MEAS - SVI(MOD-SP2): 38.5 ML/M2
BH CV ECHO MEAS - SVI(MOD-SP4): 38.5 ML/M2
BH CV ECHO MEAS - TAPSE (>1.6): 2.6 CM
BH CV ECHO MEAS - TR MAX PG: 9.5 MMHG
BH CV ECHO MEAS - TR MAX VEL: 154.3 CM/SEC
BH CV ECHO MEASUREMENTS AVERAGE E/E' RATIO: 3.49
BH CV ECHO SHUNT ASSESSMENT PERFORMED (HIDDEN SCRIPTING): 1
BH CV XLRA - RV BASE: 4.3 CM
BH CV XLRA - RV LENGTH: 7.2 CM
BH CV XLRA - RV MID: 3.1 CM
BH CV XLRA - TDI S': 18.1 CM/SEC
BUN SERPL-MCNC: 11 MG/DL (ref 6–20)
BUN/CREAT SERPL: 10 (ref 7–25)
CALCIUM SPEC-SCNC: 8.1 MG/DL (ref 8.6–10.5)
CHLORIDE SERPL-SCNC: 103 MMOL/L (ref 98–107)
CO2 SERPL-SCNC: 24.2 MMOL/L (ref 22–29)
CREAT SERPL-MCNC: 1.1 MG/DL (ref 0.76–1.27)
DEPRECATED RDW RBC AUTO: 42.9 FL (ref 37–54)
EGFRCR SERPLBLD CKD-EPI 2021: 85.4 ML/MIN/1.73
ERYTHROCYTE [DISTWIDTH] IN BLOOD BY AUTOMATED COUNT: 14.3 % (ref 12.3–15.4)
GLUCOSE SERPL-MCNC: 95 MG/DL (ref 65–99)
HCT VFR BLD AUTO: 27 % (ref 37.5–51)
HGB BLD-MCNC: 8.7 G/DL (ref 13–17.7)
MCH RBC QN AUTO: 26.6 PG (ref 26.6–33)
MCHC RBC AUTO-ENTMCNC: 32.2 G/DL (ref 31.5–35.7)
MCV RBC AUTO: 82.6 FL (ref 79–97)
PLATELET # BLD AUTO: 282 10*3/MM3 (ref 140–450)
PMV BLD AUTO: 9.4 FL (ref 6–12)
POTASSIUM SERPL-SCNC: 4.1 MMOL/L (ref 3.5–5.2)
RBC # BLD AUTO: 3.27 10*6/MM3 (ref 4.14–5.8)
SINUS: 2.7 CM
SODIUM SERPL-SCNC: 137 MMOL/L (ref 136–145)
STJ: 2.17 CM
WBC NRBC COR # BLD AUTO: 5.24 10*3/MM3 (ref 3.4–10.8)

## 2024-07-12 PROCEDURE — 93306 TTE W/DOPPLER COMPLETE: CPT

## 2024-07-12 PROCEDURE — 93306 TTE W/DOPPLER COMPLETE: CPT | Performed by: INTERNAL MEDICINE

## 2024-07-12 PROCEDURE — 25810000003 SODIUM CHLORIDE 0.9 % SOLUTION 250 ML FLEX CONT: Performed by: INTERNAL MEDICINE

## 2024-07-12 PROCEDURE — 25010000002 VANCOMYCIN 1 G RECONSTITUTED SOLUTION 1 EACH VIAL: Performed by: INTERNAL MEDICINE

## 2024-07-12 PROCEDURE — 80048 BASIC METABOLIC PNL TOTAL CA: CPT | Performed by: INTERNAL MEDICINE

## 2024-07-12 PROCEDURE — 85027 COMPLETE CBC AUTOMATED: CPT | Performed by: INTERNAL MEDICINE

## 2024-07-12 RX ORDER — DICYCLOMINE HYDROCHLORIDE 10 MG/1
10 CAPSULE ORAL 3 TIMES DAILY PRN
Status: DISCONTINUED | OUTPATIENT
Start: 2024-07-12 | End: 2024-07-13 | Stop reason: HOSPADM

## 2024-07-12 RX ORDER — CLONIDINE HYDROCHLORIDE 0.1 MG/1
0.1 TABLET ORAL 4 TIMES DAILY PRN
Status: ACTIVE | OUTPATIENT
Start: 2024-07-12 | End: 2024-07-13

## 2024-07-12 RX ORDER — CLONIDINE HYDROCHLORIDE 0.1 MG/1
0.1 TABLET ORAL 4 TIMES DAILY PRN
Status: DISCONTINUED | OUTPATIENT
Start: 2024-07-13 | End: 2024-07-13 | Stop reason: HOSPADM

## 2024-07-12 RX ORDER — CYCLOBENZAPRINE HCL 10 MG
10 TABLET ORAL 3 TIMES DAILY PRN
Status: DISCONTINUED | OUTPATIENT
Start: 2024-07-12 | End: 2024-07-13 | Stop reason: HOSPADM

## 2024-07-12 RX ORDER — CLONIDINE HYDROCHLORIDE 0.1 MG/1
0.1 TABLET ORAL 2 TIMES DAILY PRN
Status: DISCONTINUED | OUTPATIENT
Start: 2024-07-15 | End: 2024-07-13 | Stop reason: HOSPADM

## 2024-07-12 RX ORDER — HYDROXYZINE HYDROCHLORIDE 25 MG/1
50 TABLET, FILM COATED ORAL 3 TIMES DAILY PRN
Status: DISCONTINUED | OUTPATIENT
Start: 2024-07-12 | End: 2024-07-13 | Stop reason: HOSPADM

## 2024-07-12 RX ORDER — CLONIDINE HYDROCHLORIDE 0.1 MG/1
0.1 TABLET ORAL 3 TIMES DAILY PRN
Status: DISCONTINUED | OUTPATIENT
Start: 2024-07-14 | End: 2024-07-13 | Stop reason: HOSPADM

## 2024-07-12 RX ORDER — CLONIDINE HYDROCHLORIDE 0.1 MG/1
0.1 TABLET ORAL ONCE AS NEEDED
Status: DISCONTINUED | OUTPATIENT
Start: 2024-07-16 | End: 2024-07-13 | Stop reason: HOSPADM

## 2024-07-12 RX ADMIN — HYDROXYZINE HYDROCHLORIDE 50 MG: 25 TABLET ORAL at 16:30

## 2024-07-12 RX ADMIN — SODIUM CHLORIDE 1000 MG: 9 INJECTION, SOLUTION INTRAVENOUS at 09:25

## 2024-07-12 RX ADMIN — GABAPENTIN 400 MG: 400 CAPSULE ORAL at 20:32

## 2024-07-12 RX ADMIN — CYCLOBENZAPRINE 10 MG: 10 TABLET, FILM COATED ORAL at 16:30

## 2024-07-12 RX ADMIN — Medication 1 PATCH: at 09:24

## 2024-07-12 RX ADMIN — CLONIDINE HYDROCHLORIDE 0.1 MG: 0.1 TABLET ORAL at 14:26

## 2024-07-12 RX ADMIN — CLONIDINE HYDROCHLORIDE 0.1 MG: 0.1 TABLET ORAL at 18:31

## 2024-07-12 RX ADMIN — GABAPENTIN 400 MG: 400 CAPSULE ORAL at 18:31

## 2024-07-12 RX ADMIN — GABAPENTIN 400 MG: 400 CAPSULE ORAL at 12:26

## 2024-07-12 RX ADMIN — GABAPENTIN 400 MG: 400 CAPSULE ORAL at 09:24

## 2024-07-12 NOTE — H&P
HISTORY AND PHYSICAL   [facility]        Date of Admission: 2024  Patient Identification:  Name: Ryan Marina  Age: 43 y.o.  Sex: male  :  1980  MRN: [id number]                     Primary Care Physician: Provider, No Known    Chief Complaint:  43 year old gentleman presented to the emergency room with swelling of his legs and redness and tenderness of his left arm; he has been using iv heroin; he has had subjective fever and chills; denies nausea or vomiting; he has had prior episodes of cellulitis due to iv drug use    History of Present Illness:   As above    Past Medical History:  Past Medical History:   Diagnosis Date    Anemia     Arthritis     CHF (congestive heart failure)     Renal disorder     Vitamin D deficiency      Past Surgical History:  Past Surgical History:   Procedure Laterality Date    CHOLECYSTECTOMY  10/13/2009    10/13/2009--laparoscopic cholecystectomy.    FRACTURE SURGERY      KNEE ACL RECONSTRUCTION       and --anterior cruciate ligament reconstruction right knee.      Home Meds:  Medications Prior to Admission   Medication Sig Dispense Refill Last Dose    buprenorphine-naloxone (SUBOXONE) 8-2 MG per SL tablet Place 1 tablet under the tongue Daily. [facility].       gabapentin (NEURONTIN) 400 MG capsule Take 1 capsule by mouth 4 (Four) Times a Day for 15 days. 60 capsule 0     nicotine (NICODERM CQ) 14 MG/24HR patch Place 1 patch on the skin as directed by provider Daily. 28 patch 0     ondansetron ODT (ZOFRAN-ODT) 4 MG disintegrating tablet Take 1 tablet by mouth Every 6 (Six) Hours As Needed for Nausea or Vomiting for up to 30 doses. 30 tablet 0        Allergies:  No Known Allergies  Immunizations:  Immunization History   Administered Date(s) Administered    COVID-19 (MODERNA) 1st,2nd,3rd Dose Monovalent 2021, 2021     Social History:   Social History     Social History Narrative    Not on file     Social History  "    Socioeconomic History    Marital status: Single   Tobacco Use    Smoking status: Every Day     Current packs/day: 1.50     Types: Cigarettes   Vaping Use    Vaping status: Never Used   Substance and Sexual Activity    Alcohol use: No    Drug use: Yes     Frequency: 7.0 times per week     Types: Heroin     Comment: States uses daily, injecting into muscle. Approx 3G/day    Sexual activity: Defer       Family History:  Family History   Problem Relation Age of Onset    Diabetes Mother         Type 2    Hyperthyroidism Father     Diabetes Maternal Grandmother         Type 2        Review of Systems  See history of present illness and past medical history.  Patient denies headache, dizziness, syncope, falls, trauma, change in vision, change in hearing, change in taste, changes in weight, changes in appetite, focal weakness, numbness, or paresthesia.  Patient denies chest pain, palpitations, dyspnea, orthopnea, PND, cough, sinus pressure, rhinorrhea, epistaxis, hemoptysis, nausea, vomiting,hematemesis, diarrhea, constipation or hematochezia.  Denies cold or heat intolerance, polydipsia, polyuria, polyphagia. Denies hematuria, pyuria, dysuria, hesitancy, frequency or urgency. Denies consumption of raw and under cooked meats foods or change in water source.       Objective:  T Max 24 hrs: Temp (24hrs), Av.5 °F (37.5 °C), Min:98.2 °F (36.8 °C), Max:100.6 °F (38.1 °C)    Vitals Ranges:   Temp:  [98.2 °F (36.8 °C)-100.6 °F (38.1 °C)] 100.6 °F (38.1 °C)  Heart Rate:  [] 94  Resp:  [16-20] 16  BP: ()/(47-69) 117/47      Exam:  /47 (BP Location: Left arm, Patient Position: Lying)   Pulse 94   Temp (!) 100.6 °F (38.1 °C) (Oral)   Resp 16   Ht 182.9 cm (72\")   Wt 59 kg (130 lb)   SpO2 99%   BMI 17.63 kg/m²     General Appearance:    Alert, cooperative, no distress, appears stated age   Head:    Normocephalic, without obvious abnormality, atraumatic   Eyes:    PERRL, conjunctivae/corneas clear, EOM's " intact, both eyes   Ears:    Normal external ear canals, both ears   Nose:   Nares normal, septum midline, mucosa normal, no drainage    or sinus tenderness   Throat:   Lips, mucosa, and tongue normal   Neck:   Supple, symmetrical, trachea midline, no adenopathy;     thyroid:  no enlargement/tenderness/nodules; no carotid    bruit or JVD   Back:     Symmetric, no curvature, ROM normal, no CVA tenderness   Lungs:     Clear to auscultation bilaterally, respirations unlabored   Chest Wall:    No tenderness or deformity    Heart:    Regular rate and rhythm, S1 and S2 normal, no murmur, rub   or gallop   Abdomen:     Soft, nontender, bowel sounds active all four quadrants,     no masses, no hepatomegaly, no splenomegaly   Extremities:   Extremities normal, atraumatic, 1 plus edema,multiple excoriated areas; erythema                       .    Data Review:  Labs in chart were reviewed.  WBC   Date Value Ref Range Status   07/11/2024 4.37 3.40 - 10.80 10*3/mm3 Final     Hemoglobin   Date Value Ref Range Status   07/11/2024 9.3 (L) 13.0 - 17.7 g/dL Final     Hematocrit   Date Value Ref Range Status   07/11/2024 28.4 (L) 37.5 - 51.0 % Final     Platelets   Date Value Ref Range Status   07/11/2024 330 140 - 450 10*3/mm3 Final     Sodium   Date Value Ref Range Status   07/11/2024 137 136 - 145 mmol/L Final     Potassium   Date Value Ref Range Status   07/11/2024 3.9 3.5 - 5.2 mmol/L Final     Chloride   Date Value Ref Range Status   07/11/2024 103 98 - 107 mmol/L Final     CO2   Date Value Ref Range Status   07/11/2024 26.0 22.0 - 29.0 mmol/L Final     BUN   Date Value Ref Range Status   07/11/2024 15 6 - 20 mg/dL Final     Creatinine   Date Value Ref Range Status   07/11/2024 1.44 (H) 0.76 - 1.27 mg/dL Final     Glucose   Date Value Ref Range Status   07/11/2024 82 65 - 99 mg/dL Final     Calcium   Date Value Ref Range Status   07/11/2024 8.8 8.6 - 10.5 mg/dL Final     AST (SGOT)   Date Value Ref Range Status   07/11/2024 25  1 - 40 U/L Final     ALT (SGPT)   Date Value Ref Range Status   07/11/2024 16 1 - 41 U/L Final     Alkaline Phosphatase   Date Value Ref Range Status   07/11/2024 75 39 - 117 U/L Final     INR   Date Value Ref Range Status   07/11/2024 1.05 0.90 - 1.10 Final                Imaging Results (All)       Procedure Component Value Units Date/Time    XR Chest 1 View [[id number]] Collected: 07/11/24 1411     Updated: 07/11/24 1414    Narrative:      XR CHEST 1 VW-7/11/2024     HISTORY: Chest pain.     Heart size is within normal limits. Lungs appear free of acute  infiltrates. Bones and soft tissues are unremarkable.       Impression:      1. No acute process.        This report was finalized on 7/11/2024 2:11 PM by Dr. Chuckie De La O M.D on Workstation: [id number]                 Assessment:  Active Hospital Problems    Diagnosis  POA    **Cellulitis of left arm [L03.114]  Yes      Resolved Hospital Problems   No resolved problems to display.   Iv drug use  Chf  Anemia  Ckd3  Tobacco use  underweight    Plan:  Continue iv antibiotics  Trend labs  Monitor on telemetry  Await cultures  Dw patient and ed provider    Cristine Matos MD  7/11/2024  20:47 EDT

## 2024-07-12 NOTE — PLAN OF CARE
Problem: Adult Inpatient Plan of Care  Goal: Plan of Care Review  Outcome: Ongoing, Progressing  Goal: Patient-Specific Goal (Individualized)  Outcome: Ongoing, Progressing  Goal: Absence of Hospital-Acquired Illness or Injury  Outcome: Ongoing, Progressing  Intervention: Identify and Manage Fall Risk  Recent Flowsheet Documentation  Taken 7/12/2024 1600 by Nolvia Recio RN  Safety Promotion/Fall Prevention:   activity supervised   assistive device/personal items within reach   clutter free environment maintained   fall prevention program maintained   nonskid shoes/slippers when out of bed   room organization consistent   safety round/check completed  Taken 7/12/2024 1420 by Nolvia Recio RN  Safety Promotion/Fall Prevention:   activity supervised   assistive device/personal items within reach   clutter free environment maintained   fall prevention program maintained   nonskid shoes/slippers when out of bed   room organization consistent   safety round/check completed  Taken 7/12/2024 1200 by Nolvia Recio RN  Safety Promotion/Fall Prevention:   activity supervised   assistive device/personal items within reach   clutter free environment maintained   fall prevention program maintained   nonskid shoes/slippers when out of bed   room organization consistent   safety round/check completed  Taken 7/12/2024 1000 by Nolvia Recio RN  Safety Promotion/Fall Prevention:   activity supervised   assistive device/personal items within reach   clutter free environment maintained   fall prevention program maintained   nonskid shoes/slippers when out of bed   room organization consistent   safety round/check completed  Taken 7/12/2024 0800 by Nolvia Recio RN  Safety Promotion/Fall Prevention: patient off unit  Intervention: Prevent Skin Injury  Recent Flowsheet Documentation  Taken 7/12/2024 1600 by Nolvia Rceio RN  Body Position: position changed independently  Taken 7/12/2024 1420 by Nolvia Recio RN  Body Position:  position changed independently  Taken 7/12/2024 1200 by Nolvia Recio RN  Body Position: position changed independently  Taken 7/12/2024 1000 by Nolvia Recio RN  Body Position: position changed independently  Intervention: Prevent and Manage VTE (Venous Thromboembolism) Risk  Recent Flowsheet Documentation  Taken 7/12/2024 1600 by Nolvia Recio RN  Activity Management: activity encouraged  Taken 7/12/2024 1420 by Nolvia Recio RN  Activity Management: activity encouraged  Taken 7/12/2024 1200 by Nolvia Recio RN  Activity Management: activity encouraged  Taken 7/12/2024 1000 by Nolvia Recio RN  Activity Management: activity encouraged  Taken 7/12/2024 0900 by Nolvia Recio RN  Range of Motion: active ROM (range of motion) encouraged  Intervention: Prevent Infection  Recent Flowsheet Documentation  Taken 7/12/2024 1600 by Nolvia Recio RN  Infection Prevention:   hand hygiene promoted   personal protective equipment utilized   rest/sleep promoted  Taken 7/12/2024 1420 by Nolvia Recio RN  Infection Prevention:   hand hygiene promoted   personal protective equipment utilized   rest/sleep promoted  Taken 7/12/2024 1200 by Nolvia Recio RN  Infection Prevention:   hand hygiene promoted   personal protective equipment utilized   rest/sleep promoted  Taken 7/12/2024 1000 by Nolvia Recio RN  Infection Prevention:   hand hygiene promoted   personal protective equipment utilized   rest/sleep promoted  Goal: Optimal Comfort and Wellbeing  Outcome: Ongoing, Progressing  Intervention: Provide Person-Centered Care  Flowsheets  Taken 7/12/2024 1720  Trust Relationship/Rapport: (noted depression consulted access center) care explained  Taken 7/12/2024 1420  Trust Relationship/Rapport:   care explained   choices provided  Taken 7/12/2024 0900  Trust Relationship/Rapport:   care explained   choices provided   thoughts/feelings acknowledged   reassurance provided  Goal: Readiness for Transition of Care  Outcome:  Ongoing, Progressing   Goal Outcome Evaluation:

## 2024-07-12 NOTE — PROGRESS NOTES
[facility] Clinical Pharmacy Services: Vancomycin Monitoring Note    Ryan Marina is a 43 y.o. male who is on day 2/5 of pharmacy to dose vancomycin for Skin and Soft Tissue.    Previous Vancomycin Dose: 1250 mg IV on 7/11 at 2049  Microbiology: 7/11 BCx in process    Vitals/Labs  Weight: 68.9 kg (152 lb)  BMI (Calculated): 20.6  Temp:  [98.1 °F (36.7 °C)-101.8 °F (38.8 °C)] 98.1 °F (36.7 °C)   Results from last 7 days   Lab Units 07/12/24  0311 07/11/24  1400   CREATININE mg/dL 1.10 1.44*     Estimated Creatinine Clearance: 84.4 mL/min (by C-G formula based on SCr of 1.1 mg/dL).     Assessment/Plan  Vancomycin Dose: 1000 mg IV every 12 hours; provides a predicted  mg/L.hr   Next Level: Vanc Trough on 7/13 at 0800  We will continue to monitor patient changes and renal function     Thank you for involving pharmacy in this patient's care. Please contact pharmacy with any questions or concerns.       Jordan Villanueva III, PharmD  Clinical Pharmacist

## 2024-07-12 NOTE — PROGRESS NOTES
Name: Ryan Marina ADMIT: 2024   : 1980  PCP: Provider, No Known    MRN: [id number] LOS: 0 days   AGE/SEX: 43 y.o. male  ROOM: [facility]   Subjective   Chief Complaint   Patient presents with    Chest Pain    Shortness of Breath    Leg Swelling     44 yo male with history of IV heroin use who presents with fevers and arm redness    On IV ABX  Still feels poor     ROS  + f/c  No n/v  No cp/palp  No soa/cough    Objective   Vital Signs  Temp:  [98.1 °F (36.7 °C)-101.8 °F (38.8 °C)] 98.2 °F (36.8 °C)  Heart Rate:  [] 78  Resp:  [16-18] 16  BP: ()/(47-81) 133/81  SpO2:  [97 %-100 %] 100 %  on   ;   Device (Oxygen Therapy): room air  Body mass index is 20.61 kg/m².    Physical Exam  Constitutional:       Appearance: He is ill-appearing (acutely and chronically).   HENT:      Head: Normocephalic and atraumatic.   Eyes:      General: No scleral icterus.  Cardiovascular:      Rate and Rhythm: Normal rate and regular rhythm.      Heart sounds: Normal heart sounds.   Pulmonary:      Effort: Pulmonary effort is normal. No respiratory distress.      Breath sounds: Normal breath sounds.   Abdominal:      General: There is no distension.      Palpations: Abdomen is soft.      Tenderness: There is no abdominal tenderness.   Musculoskeletal:      Cervical back: Neck supple.   Skin:     Findings: Erythema (to arm) present.   Neurological:      Mental Status: He is alert.   Psychiatric:         Behavior: Behavior normal.               Results Review:       I reviewed the patient's new clinical results.  Results from last 7 days   Lab Units 24  0311 24  1400   WBC 10*3/mm3 5.24 4.37   HEMOGLOBIN g/dL 8.7* 9.3*   PLATELETS 10*3/mm3 282 330     Results from last 7 days   Lab Units 24  0311 24  1400   SODIUM mmol/L 137 137   POTASSIUM mmol/L 4.1 3.9   CHLORIDE mmol/L 103 103   CO2 mmol/L 24.2 26.0   BUN mg/dL 11 15   CREATININE mg/dL 1.10 1.44*   GLUCOSE mg/dL 95 82   Estimated  "Creatinine Clearance: 84.4 mL/min (by C-G formula based on SCr of 1.1 mg/dL).  Results from last 7 days   Lab Units 07/11/24  1400   ALBUMIN g/dL 3.2*   BILIRUBIN mg/dL 0.2   ALK PHOS U/L 75   AST (SGOT) U/L 25   ALT (SGPT) U/L 16     Results from last 7 days   Lab Units 07/12/24  0311 07/11/24  1400   CALCIUM mg/dL 8.1* 8.8   ALBUMIN g/dL  --  3.2*     Results from last 7 days   Lab Units 07/11/24  1400   PROCALCITONIN ng/mL 0.23   LACTATE mmol/L 1.4       Coag   Results from last 7 days   Lab Units 07/11/24  1400   INR  1.05   APTT seconds 27.9     HbA1C No results found for: \"HGBA1C\"  Infection   Results from last 7 days   Lab Units 07/11/24  1400   PROCALCITONIN ng/mL 0.23     Radiology(recent) XR Chest 1 View    Result Date: 7/11/2024  1. No acute process.   This report was finalized on 7/11/2024 2:11 PM by Dr. Chuckie De La O M.D on Workstation: [id number]     HS Troponin T   Date Value Ref Range Status   07/11/2024 29 (H) <22 ng/L Final     No components found for: \"TSH;2\"    cefTRIAXone, 2,000 mg, Intravenous, Q24H  gabapentin, 400 mg, Oral, 4x Daily  nicotine, 1 patch, Transdermal, Q24H  vancomycin, 1,000 mg, Intravenous, Q12H      Pharmacy to dose vancomycin,     Diet: Cardiac; Healthy Heart (2-3 Na+); Fluid Consistency: Thin (IDDSI 0)      Assessment & Plan      Active Hospital Problems    Diagnosis  POA    **Cellulitis of left arm [L03.114]  Yes    Tobacco use [Z72.0]  Yes    IVDU (intravenous drug user) [F19.90]  Yes    PAT (iron deficiency anemia) [D50.9]  Yes    Cellulitis of arm, right [L03.113]  Yes      Resolved Hospital Problems   No resolved problems to display.     Mr. Marina is a 43 y.o. male with a history of IVDA, polysubstance abuse, chronic bilateral upper extremity wounds that presents with bilateral forearm lesions, erythema, edema, pain and fevers    IV ABX, follow cultures  Monitor anemia and labs  Nicotine patch  Opioid withdrawal protocol       LEANNE RN  Reviewed records    Sam Courtney" MD Surya  [facility]  07/12/24  13:08 EDT

## 2024-07-12 NOTE — PROGRESS NOTES
"[facility] Clinical Pharmacy Services: Vancomycin Pharmacokinetic Initial Consult Note    Ryan Marina is a 43 y.o. male who is on day 1 of pharmacy to dose vancomycin.    Indication: Skin and Soft Tissue  Consulting Provider: Dr brewster  Planned Duration of Therapy: 5 days  Loading Dose Ordered or Given: 1250 mg on 7/11 at 2049    Culture/Source:   7/11 blood cx pend    Target: -600 mg/L.hr   Other Antimicrobials: rocephin    Vitals/Labs  Ht: 182.9 cm (72\"); Wt: 59 kg (130 lb)  Temp Readings from Last 1 Encounters:   07/11/24 (!) 100.6 °F (38.1 °C) (Oral)    Estimated Creatinine Clearance: 55.2 mL/min (A) (by C-G formula based on SCr of 1.44 mg/dL (H)).     Results from last 7 days   Lab Units 07/11/24  1400   CREATININE mg/dL 1.44*   WBC 10*3/mm3 4.37     Assessment/Plan:    Vancomycin Dose:   1250 mg IV every  24  hours - if renal function improves will likely need to increase frequency - will monitor and adjust as needed  Predictive AUC level for the dose ordered is 504 mg/L.hr, which is within the target of 400-600 mg/L.hr  Vanc Trough has been ordered for 7/14 at 0730     Pharmacy will follow patient's kidney function and will adjust doses and obtain levels as necessary. Thank you for involving pharmacy in this patient's care. Please contact pharmacy with any questions or concerns.                           Allie Loya, PharmD  Clinical Pharmacist    "

## 2024-07-12 NOTE — NURSING NOTE
Access Center attempted to see patient. The patient refused evaluation at this time and requested Access Center try again tomorrow (7/13/24).

## 2024-07-13 ENCOUNTER — READMISSION MANAGEMENT (OUTPATIENT)
Dept: CALL CENTER | Facility: HOSPITAL | Age: 44
End: 2024-07-13
Payer: MEDICARE

## 2024-07-13 VITALS
DIASTOLIC BLOOD PRESSURE: 76 MMHG | RESPIRATION RATE: 16 BRPM | WEIGHT: 152.56 LBS | BODY MASS INDEX: 20.66 KG/M2 | HEART RATE: 79 BPM | HEIGHT: 72 IN | SYSTOLIC BLOOD PRESSURE: 117 MMHG | TEMPERATURE: 97.5 F | OXYGEN SATURATION: 99 %

## 2024-07-13 PROBLEM — Q21.12 PFO (PATENT FORAMEN OVALE): Status: ACTIVE | Noted: 2024-07-13

## 2024-07-13 LAB
CREAT SERPL-MCNC: 0.95 MG/DL (ref 0.76–1.27)
EGFRCR SERPLBLD CKD-EPI 2021: 101.9 ML/MIN/1.73
VANCOMYCIN TROUGH SERPL-MCNC: 8.2 MCG/ML (ref 5–20)

## 2024-07-13 PROCEDURE — 80202 ASSAY OF VANCOMYCIN: CPT | Performed by: INTERNAL MEDICINE

## 2024-07-13 PROCEDURE — 82565 ASSAY OF CREATININE: CPT | Performed by: INTERNAL MEDICINE

## 2024-07-13 RX ORDER — DOXYCYCLINE 100 MG/1
100 CAPSULE ORAL EVERY 12 HOURS SCHEDULED
Status: DISCONTINUED | OUTPATIENT
Start: 2024-07-13 | End: 2024-07-13 | Stop reason: HOSPADM

## 2024-07-13 RX ORDER — CEPHALEXIN 500 MG/1
500 CAPSULE ORAL 3 TIMES DAILY
Status: DISCONTINUED | OUTPATIENT
Start: 2024-07-13 | End: 2024-07-13 | Stop reason: HOSPADM

## 2024-07-13 RX ORDER — CEPHALEXIN 500 MG/1
500 CAPSULE ORAL 3 TIMES DAILY
Qty: 39 CAPSULE | Refills: 0 | Status: SHIPPED | OUTPATIENT
Start: 2024-07-13 | End: 2024-07-26

## 2024-07-13 RX ORDER — DOXYCYCLINE 100 MG/1
100 CAPSULE ORAL EVERY 12 HOURS SCHEDULED
Qty: 26 CAPSULE | Refills: 0 | Status: SHIPPED | OUTPATIENT
Start: 2024-07-13 | End: 2024-07-26

## 2024-07-13 RX ADMIN — GABAPENTIN 400 MG: 400 CAPSULE ORAL at 11:15

## 2024-07-13 RX ADMIN — GABAPENTIN 400 MG: 400 CAPSULE ORAL at 08:52

## 2024-07-13 RX ADMIN — Medication 1 PATCH: at 08:54

## 2024-07-13 NOTE — NURSING NOTE
Loss of IV access was noted when attempting to give patient next dose of antibiotics. Pt initially was agreeable to have IV therapy come place a new one. Pt stated after an unsuccessful attempt by the IV nurse he is refusing to have one placed. Education provided to the patient about importance of IV access in order to administer antibiotic treatment. Pt continuing to refuse. HIRAM WILLSON notified.

## 2024-07-13 NOTE — PLAN OF CARE
Goal Outcome Evaluation:  Plan of Care Reviewed With: patient      Patient alert and oriented, VSS, on room air. Patient denied access consult. Orders for IV antibiotics. Patient refusing IV. Plans for discharge and to start PO doxycycline and keflex.   Respiratory

## 2024-07-13 NOTE — PROGRESS NOTES
Continued Stay Note  [facility]     Patient Name: Ryan Marina  MRN: [id number]  Today's Date: 7/13/2024    Admit Date: 7/11/2024    Plan: Home via cab, voucher provided by [facility].......Donna RN   Discharge Plan       Row Name 07/13/24 1128       Plan    Plan Home via cab, voucher provided by [facility].......Donna RN    Patient/Family in Agreement with Plan yes    Plan Comments Inbound call from RN to notify [facility] of transport needs for DC. S/W patient by phone, introduced self and role. Pt. plans to return home at DC and verified face sheet information correct. Pt. agreeable to [facility] cab for transport home, unable to cover cost, [facility] provided with voucher. No further needs voiced..........Donna RN                   Discharge Codes    No documentation.                 Expected Discharge Date and Time       Expected Discharge Date Expected Discharge Time    Jul 13, 2024               Monica Harrington, RN

## 2024-07-13 NOTE — CONSULTS
"Attempted [facility] consult d/t depression/anxiety/drugs. Primary RN reports pt continues to refuse IV despite encouragement and education around medical needs.     Pt alone in room and resting in bed w/ blanket over his head upon entry. Pt never removed blanket from his head during visit. Introduced self and role but pt immediately declined assessment. Stated \"I'm fine\" and continued to decline resources or support. Access will sign off to respect pt's wishes. Updated primary RN. Pt and primary RN aware that Access Center can be reconsulted if pt becomes interested in support or resources.   "

## 2024-07-13 NOTE — PROGRESS NOTES
"[facility] Clinical Pharmacy Services: Vancomycin Monitoring Note    Ryan Marina is a 43 y.o. male who is on day 3/5 of pharmacy to dose vancomycin for chronic BLE wounds with surrounding cellulitis.     Previous Vancomycin Dose: 1000 mg IV every 12 hours    Updated Cultures and Sensitivities:   7/11: BCx-NG at 24 hrs    Results from last 7 days   Lab Units 07/13/24  0356   VANCOMYCIN TR mcg/mL 8.20     Vitals/Labs  Ht: 182.9 cm (72\"); Wt: 68.9 kg (152 lb)   Temp Readings from Last 1 Encounters:   07/12/24 97.9 °F (36.6 °C) (Oral)     Estimated Creatinine Clearance: 97.7 mL/min (by C-G formula based on SCr of 0.95 mg/dL).     Results from last 7 days   Lab Units 07/13/24  0356 07/12/24  0311 07/11/24  1400   CREATININE mg/dL 0.95 1.10 1.44*   WBC 10*3/mm3  --  5.24 4.37     Assessment/Plan    Level came back at 8.2 mcg/mL with a corresponding AUC of 505 mg/L.hr. This is within the goal AUC range of 400-600. Will continue current dosing.   Next Level Date and Time: No need for additional levels unless DOT is lengthened or renal function changes.  We will continue to monitor patient changes and renal function until vancomycin is discontinued or patient is discharged-SCr is now 0.95 after trending down over the past couple of days. BMP has been ordered daily x 2 occurrences to trend renal function while the patient is on vancomycin.     Thank you for involving pharmacy in this patient's care. Please contact pharmacy with any questions or concerns.       Susan Holm, Pharm.D., [facility]   Clinical Pharmacist  "

## 2024-07-13 NOTE — OUTREACH NOTE
Prep Survey      Flowsheet Row Responses   [facility] facility patient discharged from? [place]   Is LACE score < 7 ? No   Eligibility Readm Mgmt   Discharge diagnosis Cellulitis of left arm   Does the patient have one of the following disease processes/diagnoses(primary or secondary)? Other   Prep survey completed? Yes            Hermelinda COHEN - Registered Nurse

## 2024-07-13 NOTE — PLAN OF CARE
Problem: Adult Inpatient Plan of Care  Goal: Plan of Care Review  Outcome: Ongoing, Progressing  Flowsheets (Taken 7/13/2024 0340)  Progress: no change  Goal: Patient-Specific Goal (Individualized)  Outcome: Ongoing, Progressing  Goal: Absence of Hospital-Acquired Illness or Injury  Outcome: Ongoing, Progressing  Intervention: Identify and Manage Fall Risk  Recent Flowsheet Documentation  Taken 7/13/2024 0228 by Ryan Barney RN  Safety Promotion/Fall Prevention: safety round/check completed  Taken 7/13/2024 0040 by Ryan Barney RN  Safety Promotion/Fall Prevention: safety round/check completed  Taken 7/12/2024 2032 by Ryan Barney RN  Safety Promotion/Fall Prevention:   safety round/check completed   room organization consistent   clutter free environment maintained   assistive device/personal items within reach  Intervention: Prevent Skin Injury  Recent Flowsheet Documentation  Taken 7/12/2024 2032 by Ryan Barney RN  Body Position: position changed independently  Intervention: Prevent and Manage VTE (Venous Thromboembolism) Risk  Recent Flowsheet Documentation  Taken 7/12/2024 2032 by Ryan Barney RN  Activity Management: activity encouraged  VTE Prevention/Management: patient refused intervention  Goal: Optimal Comfort and Wellbeing  Outcome: Ongoing, Progressing  Intervention: Provide Person-Centered Care  Recent Flowsheet Documentation  Taken 7/12/2024 2032 by Ryan Barney RN  Trust Relationship/Rapport:   care explained   choices provided  Goal: Readiness for Transition of Care  Outcome: Ongoing, Progressing     Problem: Fall Injury Risk  Goal: Absence of Fall and Fall-Related Injury  Outcome: Ongoing, Progressing  Intervention: Promote Injury-Free Environment  Recent Flowsheet Documentation  Taken 7/13/2024 0228 by Ryan Barney RN  Safety Promotion/Fall Prevention: safety round/check completed  Taken 7/13/2024 0040 by Ryan Barney RN  Safety Promotion/Fall Prevention: safety  round/check completed  Taken 7/12/2024 2032 by Ryan Barney, RN  Safety Promotion/Fall Prevention:   safety round/check completed   room organization consistent   clutter free environment maintained   assistive device/personal items within reach     Problem: Infection  Goal: Absence of Infection Signs and Symptoms  Outcome: Ongoing, Progressing     Problem: Impaired Wound Healing  Goal: Optimal Wound Healing  Outcome: Ongoing, Progressing  Intervention: Promote Wound Healing  Recent Flowsheet Documentation  Taken 7/12/2024 2032 by Ryan Barney, RN  Activity Management: activity encouraged     Problem: Pain Acute  Goal: Acceptable Pain Control and Functional Ability  Outcome: Ongoing, Progressing     Problem: Fluid Volume Excess  Goal: Fluid Balance  Outcome: Ongoing, Progressing   Goal Outcome Evaluation:           Progress: no change

## 2024-07-13 NOTE — DISCHARGE SUMMARY
NAME: Ryan Marina ADMIT: 2024   : 1980  PCP: Provider, No Known    MRN: [id number] LOS: 1 days   AGE/SEX: 43 y.o. male  ROOM: S503/1     Date of Admission:  2024  Date of Discharge:  2024    PCP: Provider, No Known    CHIEF COMPLAINT  Chest Pain, Shortness of Breath, and Leg Swelling      DISCHARGE DIAGNOSIS  Active Hospital Problems    Diagnosis  POA    **Cellulitis of left arm [L03.114]  Yes    Tobacco use [Z72.0]  Yes    IVDU (intravenous drug user) [F19.90]  Yes    PAT (iron deficiency anemia) [D50.9]  Yes    Cellulitis of arm, right [L03.113]  Yes      Resolved Hospital Problems   No resolved problems to display.       SECONDARY DIAGNOSES  Past Medical History:   Diagnosis Date    Anemia     Arthritis     CHF (congestive heart failure)     Renal disorder     Vitamin D deficiency        CONSULTS   [facility]    HOSPITAL COURSE  Mr. Marina is a 43 y.o. male with a history of IVDA, polysubstance abuse, chronic bilateral upper extremity wounds that presents with bilateral forearm lesions, erythema, edema, pain and fevers. He had been injecting drugs into his arms resulting in these rashes to his arms     He was given IV ABX, blood cultures negative. 2D ECHO negative for endocarditis. +incidental small PFO. He actually has been refusing IV for IV ABX and wishes for discharge and will send him to finish 13 additional days of PO ABX and follow up with his outpatient providers    DIAGNOSTICS     XR Chest 1 View [[id number]] Ryan as Reviewed   Order Status: Completed Collected: 24 1411    Updated: 24 141   Narrative:     XR CHEST 1 VW-2024     HISTORY: Chest pain.     Heart size is within normal limits. Lungs appear free of acute  infiltrates. Bones and soft tissues are unremarkable.      Impression:     1. No acute process.        Collected Updated Procedure Result Status    2024 0356 2024 0459 Creatinine Serum (kidney function) GFR component  [[id number]]    Blood    Final result Component Value Units   Creatinine 0.95 mg/dL   eGFR 101.9 mL/min/1.73          07/13/2024 0356 07/13/2024 0459 Vancomycin, Trough [[id number]]    Blood    Final result Component Value Units   Vancomycin Trough 8.20 mcg/mL          07/12/2024 0311 07/12/2024 0415 Basic Metabolic Panel [[id number]]    (Abnormal)   Blood    Final result Component Value Units   Glucose 95 mg/dL   BUN 11 mg/dL   Creatinine 1.10 mg/dL   Sodium 137 mmol/L   Potassium 4.1 mmol/L   Chloride 103 mmol/L   CO2 24.2 mmol/L   Calcium 8.1 Low  mg/dL   BUN/Creatinine Ratio 10.0    Anion Gap 9.8 mmol/L   eGFR 85.4 mL/min/1.73          07/12/2024 0311 07/12/2024 0358 CBC (No Diff) [[id number]]   (Abnormal)   Blood    Final result Component Value Units   WBC 5.24 10*3/mm3   RBC 3.27 Low  10*6/mm3   Hemoglobin 8.7 Low  g/dL   Hematocrit 27.0 Low  %   MCV 82.6 fL   MCH 26.6 pg   MCHC 32.2 g/dL   RDW 14.3 %   RDW-SD 42.9 fl   MPV 9.4 fL               07/11/2024 1928 07/12/2024 2015 Blood Culture - Blood, Arm, Left [[id number]]    Blood from Arm, Left    Preliminary result Component Value   Blood Culture No growth at 24 hours P             07/11/2024 1918 07/12/2024 2015 Blood Culture - Blood, Arm, Right [[id number]]    Blood from Arm, Right    Preliminary result Component Value   Blood Culture No growth at 24 hours P             07/11/2024 1431 07/11/2024 1444 Urinalysis With Microscopic If Indicated (No Culture) - Urine, Clean Catch [[id number]]   (Abnormal)   Urine, Clean Catch    Final result Component Value   Color, UA Yellow   Appearance, UA Clear   pH, UA 5.5   Specific Gravity, UA 1.018   Glucose, UA Negative   Ketones, UA Negative   Bilirubin, UA Negative   Blood, UA Trace Abnormal    Protein, UA Negative   Leuk Esterase, UA Negative   Nitrite, UA Negative   Urobilinogen, UA 1.0 E.U./dL          07/11/2024 1431 07/11/2024 1444 Urinalysis, Microscopic Only - Urine, Clean Catch [[id number]]   Urine, Clean Catch     "Final result Component Value Units   RBC, UA 0-2 /HPF   WBC, UA 0-2 /HPF   Bacteria, UA None Seen /HPF   Squamous Epithelial Cells, UA 0-2 /HPF   Hyaline Casts, UA 0-2 /LPF   Methodology Automated Microscopy              PHYSICAL EXAM  Objective:  Vital signs: (most recent): Blood pressure 117/76, pulse 79, temperature 97.5 °F (36.4 °C), temperature source Oral, resp. rate 16, height 182.9 cm (72\"), weight 69.2 kg (152 lb 8.9 oz), SpO2 99%.                Alert  nad  No resp distress  Chronically ill  Cellulitis bilateral arms and needle marks, no abscess or vascular compromise    CONDITION ON DISCHARGE  Stable.      DISCHARGE DISPOSITION         DISCHARGE MEDICATIONS       Your medication list        START taking these medications        Instructions Last Dose Given Next Dose Due   cephalexin 500 MG capsule  Commonly known as: KEFLEX      Take 1 capsule by mouth 3 times a day for 39 doses. Indications: Infection of the Skin and/or Soft Tissue       doxycycline 100 MG capsule  Commonly known as: MONODOX      Take 1 capsule by mouth Every 12 (Twelve) Hours for 26 doses. Indications: Infection of the Skin and/or Soft Tissue              CONTINUE taking these medications        Instructions Last Dose Given Next Dose Due   buprenorphine-naloxone 8-2 MG per SL tablet  Commonly known as: SUBOXONE      Place 1 tablet under the tongue Daily. [facility].       gabapentin 400 MG capsule  Commonly known as: NEURONTIN      Take 1 capsule by mouth 4 (Four) Times a Day for 15 days.       nicotine 14 MG/24HR patch  Commonly known as: NICODERM CQ      Place 1 patch on the skin as directed by provider Daily.       ondansetron ODT 4 MG disintegrating tablet  Commonly known as: ZOFRAN-ODT      Take 1 tablet by mouth Every 6 (Six) Hours As Needed for Nausea or Vomiting for up to 30 doses.                 Where to Get Your Medications        These medications were sent to [facility]  [address] [facility] [address]      Hours: Monday to Friday 7 AM to 6 PM, Saturday & Sunday 8 AM to 4:30 PM (Closed 12 PM to 12:30 PM) Phone: [phone]   cephalexin 500 MG capsule  doxycycline 100 MG capsule        No future appointments.  Additional Instructions for the Follow-ups that You Need to Schedule       Discharge Follow-up with Specialty: PCP in 1-2 weeks. Hand Surgery or ER if worsening fever or developing abscess   As directed      Specialty: PCP in 1-2 weeks. Hand Surgery or ER if worsening fever or developing abscess               Follow-up Information       Provider, No Known .    Contact information:  [place] [id number] [phone]                             TEST  RESULTS PENDING AT DISCHARGE  Pending Labs       Order Current Status    Blood Culture - Blood, Arm, Left Preliminary result    Blood Culture - Blood, Arm, Right Preliminary result               Sam London MD  [facility]  07/13/24  10:28 EDT      Time: greater than 32 minutes on discharge  It was a pleasure taking care of this patient while in the hospital.

## 2024-07-14 NOTE — CASE MANAGEMENT/SOCIAL WORK
Case Management Discharge Note      Final Note: home         Selected Continued Care - Discharged on 7/13/2024 Admission date: 7/11/2024 - Discharge disposition: Home or Self Care      Destination    No services have been selected for the patient.                Durable Medical Equipment    No services have been selected for the patient.                Dialysis/Infusion    No services have been selected for the patient.                Home Medical Care    No services have been selected for the patient.                Therapy    No services have been selected for the patient.                Community Resources    No services have been selected for the patient.                Community & DME    No services have been selected for the patient.                         Final Discharge Disposition Code: 01 - home or self-care

## 2024-07-16 LAB
BACTERIA SPEC AEROBE CULT: NORMAL
BACTERIA SPEC AEROBE CULT: NORMAL

## 2024-07-18 ENCOUNTER — READMISSION MANAGEMENT (OUTPATIENT)
Dept: CALL CENTER | Facility: HOSPITAL | Age: 44
End: 2024-07-18
Payer: MEDICARE

## 2024-07-18 NOTE — OUTREACH NOTE
Medical Week 1 Survey      Flowsheet Row Responses   [facility] patient discharged from? [place]   Does the patient have one of the following disease processes/diagnoses(primary or secondary)? Other   Week 1 attempt successful? No   Unsuccessful attempts Attempt 1            Vanessa Zendejas Registered Nurse

## 2024-07-23 ENCOUNTER — READMISSION MANAGEMENT (OUTPATIENT)
Dept: CALL CENTER | Facility: HOSPITAL | Age: 44
End: 2024-07-23
Payer: MEDICARE

## 2024-07-23 NOTE — OUTREACH NOTE
Medical Week 2 Survey      Flowsheet Row Responses   [facility] patient discharged from? [place]   Does the patient have one of the following disease processes/diagnoses(primary or secondary)? Other   Week 2 attempt successful? No   Unsuccessful attempts Attempt 1  [Father's number is no longer in service. Patient's cell phone voicemail not set up.]            Gemma ECHEVERRIA - Registered Nurse

## 2024-07-29 ENCOUNTER — READMISSION MANAGEMENT (OUTPATIENT)
Dept: CALL CENTER | Facility: HOSPITAL | Age: 44
End: 2024-07-29
Payer: MEDICARE

## 2024-07-29 NOTE — OUTREACH NOTE
Medical Week 2 Survey      Flowsheet Row Responses   [facility] patient discharged from? [place]   Does the patient have one of the following disease processes/diagnoses(primary or secondary)? Other   Week 2 attempt successful? No   Unsuccessful attempts Attempt 2            Sinai BOSCH - Registered Nurse

## 2024-08-07 ENCOUNTER — READMISSION MANAGEMENT (OUTPATIENT)
Dept: CALL CENTER | Facility: HOSPITAL | Age: 44
End: 2024-08-07
Payer: MEDICARE

## 2024-08-07 NOTE — OUTREACH NOTE
Medical Week 3 Survey      Flowsheet Row Responses   [facility] patient discharged from? [place]   Does the patient have one of the following disease processes/diagnoses(primary or secondary)? Other   Week 3 attempt successful? No   Unsuccessful attempts Attempt 1   Revoke Decline to participate  [Numerous calls, no answer.]            Miko ELLSWORTH - Registered Nurse